# Patient Record
Sex: FEMALE | Race: WHITE | NOT HISPANIC OR LATINO | Employment: FULL TIME | URBAN - METROPOLITAN AREA
[De-identification: names, ages, dates, MRNs, and addresses within clinical notes are randomized per-mention and may not be internally consistent; named-entity substitution may affect disease eponyms.]

---

## 2018-09-25 ENCOUNTER — OFFICE VISIT (OUTPATIENT)
Dept: OBSTETRICS AND GYNECOLOGY | Facility: CLINIC | Age: 33
End: 2018-09-25
Payer: COMMERCIAL

## 2018-09-25 VITALS
WEIGHT: 274.5 LBS | HEIGHT: 67 IN | SYSTOLIC BLOOD PRESSURE: 140 MMHG | BODY MASS INDEX: 43.08 KG/M2 | DIASTOLIC BLOOD PRESSURE: 98 MMHG

## 2018-09-25 DIAGNOSIS — Z12.4 SCREENING FOR CERVICAL CANCER: ICD-10-CM

## 2018-09-25 DIAGNOSIS — Z01.419 WELL WOMAN EXAM WITH ROUTINE GYNECOLOGICAL EXAM: Primary | ICD-10-CM

## 2018-09-25 PROCEDURE — 88175 CYTOPATH C/V AUTO FLUID REDO: CPT

## 2018-09-25 PROCEDURE — 99999 PR PBB SHADOW E&M-NEW PATIENT-LVL III: CPT | Mod: PBBFAC,,, | Performed by: OBSTETRICS & GYNECOLOGY

## 2018-09-25 PROCEDURE — 87624 HPV HI-RISK TYP POOLED RSLT: CPT

## 2018-09-25 PROCEDURE — 99385 PREV VISIT NEW AGE 18-39: CPT | Mod: S$GLB,,, | Performed by: OBSTETRICS & GYNECOLOGY

## 2018-09-25 RX ORDER — LISINOPRIL 10 MG/1
10 TABLET ORAL DAILY
COMMUNITY
End: 2018-09-25 | Stop reason: SDUPTHER

## 2018-09-25 RX ORDER — LISINOPRIL 10 MG/1
10 TABLET ORAL DAILY
Qty: 30 TABLET | Refills: 2 | Status: SHIPPED | OUTPATIENT
Start: 2018-09-25 | End: 2019-03-18 | Stop reason: SDUPTHER

## 2018-09-25 NOTE — PROGRESS NOTES
"       GYNECOLOGY OFFICE NOTE    Reason for visit: annual    HPI: Pt is a 32 y.o.  female  who presents for annual. Cycle: menarche- 11, Interval-  Q month, Duration- 4-5 days, Flow- heavy (changes ultra tampons every 2-3 hours for the first 3 heavy days), denies dysmenorrhea. She is sexually active, trying to conceived for a little over 1 yr.  had vasectomy 2015 and had reversal 2017 (has not yet followed up). She does not desire STI screening. She denies vaginal discharge.  Last pap: , denies hx of abnormal.     Past Medical History:   Diagnosis Date    Asthma     Hypertension        Past Surgical History:   Procedure Laterality Date    tonsilectomy         Family History   Problem Relation Age of Onset    Heart failure Paternal Grandmother     Heart disease Maternal Grandmother     Colon cancer Maternal Grandfather     Leukemia Maternal Grandfather     Hypertension Father     Rheum arthritis Mother     Asthma Mother     Heart failure Mother     Hypertension Mother        Social History     Tobacco Use    Smoking status: Former Smoker     Packs/day: 0.50     Types: Cigarettes     Last attempt to quit: 4/10/2018     Years since quittin.4   Substance Use Topics    Alcohol use: No     Frequency: Never    Drug use: No       OB History    Para Term  AB Living   0 0 0 0 0 0   SAB TAB Ectopic Multiple Live Births   0 0 0 0 0             Current Outpatient Medications   Medication Sig    lisinopril 10 MG tablet Take 1 tablet (10 mg total) by mouth once daily.     No current facility-administered medications for this visit.        Allergies: Peanut and Erythromycin     BP (!) 140/98   Ht 5' 7" (1.702 m)   Wt 124.5 kg (274 lb 7.6 oz)   LMP 2018 (Approximate)   BMI 42.99 kg/m²     ROS:  GENERAL: Denies fever or chills.   SKIN: Denies rash or lesions.   HEAD: Denies head injury or headache.   CHEST: Denies chest pain or shortness of breath.   CARDIOVASCULAR: " Denies palpitations or chest pain.   ABDOMEN: No abdominal pain, constipation, diarrhea, nausea, vomiting or rectal bleeding.   URINARY: No dysuria, hematuria, or burning on urination.  REPRODUCTIVE: See HPI.   BREASTS: see HPI  NEUROLOGIC: Denies syncope or weakness.     Physical Exam:  GENERAL: alert, appears stated age and cooperative  NEUROLOGIC: orientated to person, place and time, normal mood and affect   CHEST: Normal respiratory effort  NECK: normal appearance, no thyromegaly masses or tenderness  SKIN: no acne, striae, hirsutism  BREAST EXAM: breasts appear normal, no suspicious masses, no skin or nipple changes or axillary nodes  ABDOMEN: abdomen is soft without significant tenderness, masses, organomegaly or guarding, no hernias noted  EXTERNAL GENITALIA:  normal general appearance  URETHRA: normal urethra, normal urethral meatus  VAGINA:  normal mucosa without prolapse or lesions  CERVIX:  Normal  UTERUS:  normal size, mobile, non-tender, normal shape and consistency  ADNEXA:  normal adnexa in size, nontender and no masses    Diagnosis:  1. Well woman exam with routine gynecological exam    2. Screening for cervical cancer        Plan:   1. Annual- refill on lisinopril sent. Encourage to establish care with PCP. Discussed weight loss, BP meds, need for SA, prior to starting work-up for trying to conceive  2. Pap/hpv today    Orders Placed This Encounter    HPV High Risk Genotypes, PCR    Liquid-based pap smear, screening    lisinopril 10 MG tablet       Patient was counseled today on the new ACS guidelines for cervical cytology screening as well as the current recommendations for breast cancer screening. She was counseled to follow up with her PCP for other routine health maintenance.     Follow-up for annual.      Rama Plummer MD  OB/GYN  Pager: [phone]

## 2018-10-02 LAB
HPV HR 12 DNA CVX QL NAA+PROBE: NEGATIVE
HPV16 AG SPEC QL: NEGATIVE
HPV18 DNA SPEC QL NAA+PROBE: NEGATIVE

## 2019-02-08 ENCOUNTER — OFFICE VISIT (OUTPATIENT)
Dept: URGENT CARE | Facility: CLINIC | Age: 34
End: 2019-02-08
Payer: COMMERCIAL

## 2019-02-08 VITALS
DIASTOLIC BLOOD PRESSURE: 95 MMHG | RESPIRATION RATE: 18 BRPM | HEIGHT: 67 IN | TEMPERATURE: 96 F | WEIGHT: 274 LBS | HEART RATE: 84 BPM | OXYGEN SATURATION: 98 % | SYSTOLIC BLOOD PRESSURE: 154 MMHG | BODY MASS INDEX: 43 KG/M2

## 2019-02-08 DIAGNOSIS — J11.1 INFLUENZA: Primary | ICD-10-CM

## 2019-02-08 DIAGNOSIS — J02.9 SORE THROAT: ICD-10-CM

## 2019-02-08 LAB
CTP QC/QA: YES
CTP QC/QA: YES
FLUAV AG NPH QL: NEGATIVE
FLUBV AG NPH QL: NEGATIVE
S PYO RRNA THROAT QL PROBE: NEGATIVE

## 2019-02-08 PROCEDURE — 3008F BODY MASS INDEX DOCD: CPT | Mod: CPTII,S$GLB,, | Performed by: PHYSICIAN ASSISTANT

## 2019-02-08 PROCEDURE — 87804 POCT INFLUENZA A/B: ICD-10-PCS | Mod: QW,S$GLB,, | Performed by: PHYSICIAN ASSISTANT

## 2019-02-08 PROCEDURE — 87804 INFLUENZA ASSAY W/OPTIC: CPT | Mod: QW,S$GLB,, | Performed by: PHYSICIAN ASSISTANT

## 2019-02-08 PROCEDURE — 99203 OFFICE O/P NEW LOW 30 MIN: CPT | Mod: S$GLB,,, | Performed by: PHYSICIAN ASSISTANT

## 2019-02-08 PROCEDURE — 99203 PR OFFICE/OUTPT VISIT, NEW, LEVL III, 30-44 MIN: ICD-10-PCS | Mod: S$GLB,,, | Performed by: PHYSICIAN ASSISTANT

## 2019-02-08 PROCEDURE — 3008F PR BODY MASS INDEX (BMI) DOCUMENTED: ICD-10-PCS | Mod: CPTII,S$GLB,, | Performed by: PHYSICIAN ASSISTANT

## 2019-02-08 PROCEDURE — 87880 STREP A ASSAY W/OPTIC: CPT | Mod: QW,S$GLB,, | Performed by: PHYSICIAN ASSISTANT

## 2019-02-08 PROCEDURE — 87880 POCT RAPID STREP A: ICD-10-PCS | Mod: QW,S$GLB,, | Performed by: PHYSICIAN ASSISTANT

## 2019-02-08 RX ORDER — OSELTAMIVIR PHOSPHATE 75 MG/1
75 CAPSULE ORAL 2 TIMES DAILY
Qty: 10 CAPSULE | Refills: 0 | Status: SHIPPED | OUTPATIENT
Start: 2019-02-08 | End: 2019-02-13

## 2019-02-08 RX ORDER — ONDANSETRON 4 MG/1
4 TABLET, ORALLY DISINTEGRATING ORAL EVERY 8 HOURS PRN
Qty: 12 TABLET | Refills: 0 | Status: SHIPPED | OUTPATIENT
Start: 2019-02-08 | End: 2020-02-27

## 2019-02-08 NOTE — PROGRESS NOTES
"Subjective:       Patient ID: Michelle Izaguirre is a 33 y.o. female.    Vitals:  height is 5' 7" (1.702 m) and weight is 124.3 kg (274 lb). Her temperature is 96.3 °F (35.7 °C). Her blood pressure is 154/95 (abnormal) and her pulse is 84. Her respiration is 18 and oxygen saturation is 98%.     Chief Complaint: Sore Throat    Patient has some was seen yesterday and has positive for the flu.  Earlier today she began having sore throat fever headache and fatigue.      Sore Throat    This is a new problem. The current episode started today. The problem has been unchanged. The maximum temperature recorded prior to her arrival was 100.4 - 100.9 F. The pain is at a severity of 2/10. Associated symptoms include headaches. Pertinent negatives include no congestion, coughing, ear pain, shortness of breath, stridor or vomiting. She has had no exposure to strep or mono. She has tried nothing for the symptoms.       Constitution: Positive for fatigue and fever. Negative for chills and sweating.   HENT: Positive for sore throat. Negative for ear pain, congestion, sinus pain, sinus pressure and voice change.    Neck: Negative for painful lymph nodes.   Eyes: Negative for eye redness.   Respiratory: Negative for chest tightness, cough, sputum production, bloody sputum, COPD, shortness of breath, stridor, wheezing and asthma.    Gastrointestinal: Negative for nausea and vomiting.   Musculoskeletal: Negative for muscle ache.   Skin: Negative for rash.   Allergic/Immunologic: Negative for seasonal allergies and asthma.   Neurological: Positive for headaches.   Hematologic/Lymphatic: Negative for swollen lymph nodes.       Objective:      Physical Exam   Constitutional: She is oriented to person, place, and time. She appears well-developed and well-nourished. She is cooperative.  Non-toxic appearance. She does not appear ill. No distress.   HENT:   Head: Normocephalic and atraumatic.   Right Ear: Hearing, tympanic membrane, external ear " and ear canal normal.   Left Ear: Hearing, tympanic membrane, external ear and ear canal normal.   Nose: Rhinorrhea present. No mucosal edema or nasal deformity. No epistaxis. Right sinus exhibits no maxillary sinus tenderness and no frontal sinus tenderness. Left sinus exhibits no maxillary sinus tenderness and no frontal sinus tenderness.   Mouth/Throat: Uvula is midline and mucous membranes are normal. No trismus in the jaw. Normal dentition. No uvula swelling. Posterior oropharyngeal erythema present. Tonsils are 0 on the right. Tonsils are 0 on the left.   Eyes: Conjunctivae and lids are normal. No scleral icterus.   Sclera clear bilat   Neck: Trachea normal, full passive range of motion without pain and phonation normal. Neck supple.   Cardiovascular: Normal rate, regular rhythm, normal heart sounds, intact distal pulses and normal pulses.   Pulmonary/Chest: Effort normal and breath sounds normal. No respiratory distress.   Abdominal: Soft. Normal appearance and bowel sounds are normal. She exhibits no distension. There is no tenderness.   Musculoskeletal: Normal range of motion. She exhibits no edema or deformity.   Neurological: She is alert and oriented to person, place, and time. She exhibits normal muscle tone. Coordination normal.   Skin: Skin is warm, dry and intact. She is not diaphoretic. No pallor.   Psychiatric: She has a normal mood and affect. Her speech is normal and behavior is normal. Judgment and thought content normal. Cognition and memory are normal.   Nursing note and vitals reviewed.      Assessment:       1. Influenza    2. Sore throat        Plan:         Influenza  -     oseltamivir (TAMIFLU) 75 MG capsule; Take 1 capsule (75 mg total) by mouth 2 (two) times daily. for 5 days  Dispense: 10 capsule; Refill: 0  -     ondansetron (ZOFRAN-ODT) 4 MG TbDL; Take 1 tablet (4 mg total) by mouth every 8 (eight) hours as needed (nausea).  Dispense: 12 tablet; Refill: 0    Sore throat  -     POCT  rapid strep A  -     POCT Influenza A/B          The Flu (Influenza)     The virus that causes the flu spreads through the air in droplets when someone who has the flu coughs, sneezes, laughs, or talks.   The flu (influenza) is an infection that affects your respiratory tract. This tract is made up of your mouth, nose, and lungs, and the passages between them. Unlike a cold, the flu can make you very ill. And it can lead to pneumonia, a serious lung infection. The flu can have serious complications and even cause death.  Who is at risk for the flu?  Anyone can get the flu. But you are more likely to become infected if you:  · Have a weakened immune system  · Work in a healthcare setting where you may be exposed to flu germs  · Live or work with someone who has the flu  · Havent had an annual flu shot  How does the flu spread?  The flu is caused by a virus. The virus spreads through the air in droplets when someone who has the flu coughs, sneezes, laughs, or talks. You can become infected when you inhale these viruses directly. You can also become infected when you touch a surface on which the droplets have landed and then transfer the germs to your eyes, nose, or mouth. Touching used tissues, or sharing utensils, drinking glasses, or a toothbrush from an infected person can expose you to flu viruses, too.  What are the symptoms of the flu?  Flu symptoms tend to come on quickly and may last a few days to a few weeks. They include:  · Fever usually higher than 100.4°F  (38°C) and chills  · Sore throat and headache  · Dry cough  · Runny nose  · Tiredness and weakness  · Muscle aches  Who is at risk for flu complications?  For some people, the flu can be very serious. The risk for complications is greater for:  · Children younger than age 5  · Adults ages 65 and older  · People with a chronic illness such as diabetes or heart, kidney, or lung disease  · People who live in a nursing home or long-term care facility   How  is the flu treated?  The flu usually gets better after 7 days or so. In some cases, your healthcare provider may prescribe an antiviral medicine. This may help you get well a little sooner. For the medicine to help, you need to take it as soon as possible (ideally within 48 hours) after your symptoms start. If you develop pneumonia or other serious illness, you may need to stay in the hospital.  Easing flu symptoms  · Drink lots of fluids such as water, juice, and warm soup. A good rule is to drink enough so that you urinate your normal amount.  · Get plenty of rest.  · Ask your healthcare provider what to take for fever and pain.  · Call your provider if your fever is 100.4°F (38°C) or higher, or you become dizzy, lightheaded, or short of breath.  Taking steps to protect others  · Wash your hands often, especially after coughing or sneezing. Or clean your hands with an alcohol-based hand  containing at least 60% alcohol.  · Cough or sneeze into a tissue. Then throw the tissue away and wash your hands. If you dont have a tissue, cough and sneeze into your elbow.  · Stay home until at least 24 hours after you no longer have a fever or chills. Be sure the fever isnt being hidden by fever-reducing medicine.  · Dont share food, utensils, drinking glasses, or a toothbrush with others.  · Ask your healthcare provider if others in your household should get antiviral medicine to help them avoid infection.  How can the flu be prevented?  · One of the best ways to avoid the flu is to get a flu vaccine each year. The virus that causes the flu changes from year to year. For that reason, healthcare providers recommend getting the flu vaccine each year, as soon as it's available in your area. The vaccine is given as a shot. Your healthcare provider can tell you which vaccine is right for you. A nasal spray is also available but is not recommended for the 3534-7643 flu season. The CDC says this is because the nasal spray  did not seem to protect against the flu over the last several flu seasons. In the past, it was meant for people ages 2 to 49.  · Wash your hands often. Frequent handwashing is a proven way to help prevent infection.  · Carry an alcohol-based hand gel containing at least 60% alcohol. Use it when you can't use soap and water. Then wash your hands as soon as you can.  · Avoid touching your eyes, nose, and mouth.  · At home and work, clean phones, computer keyboards, and toys often with disinfectant wipes.  · If possible, avoid close contact with others who have the flu or symptoms of the flu.  Handwashing tips  Handwashing is one of the best ways to prevent many common infections. If you are caring for or visiting someone with the flu, wash your hands each time you enter and leave the room. Follow these steps:  · Use warm water and plenty of soap. Rub your hands together well.  · Clean the whole hand, including under your nails, between your fingers, and up the wrists.  · Wash for at least 15 seconds.  · Rinse, letting the water run down your fingers, not up your wrists.  · Dry your hands well. Use a paper towel to turn off the faucet and open the door.  Using alcohol-based hand   Alcohol-based hand  are also a good choice. Use them when you can't use soap and water. Follow these steps:  · Squeeze about a tablespoon of gel into the palm of one hand.  · Rub your hands together briskly, cleaning the backs of your hands, the palms, between your fingers, and up the wrists.  · Rub until the gel is gone and your hands are completely dry.  Preventing the flu in healthcare settings  The flu is a special concern for people in hospitals and long-term care facilities. To help prevent the spread of flu, many hospitals and nursing homes take these steps:  · Healthcare providers wash their hands or use an alcohol-based hand  before and after treating each patient.  · People with the flu have private rooms and  bathrooms or share a room with someone with the same infection.  · People who are at high risk for the flu but don't have it are encouraged to get the flu and pneumonia vaccines.  · All healthcare workers are encouraged or required to get flu shots.   Date Last Reviewed: 12/1/2016  © 7324-0585 [employer]. [address] [place] [address], [place] [address]. All rights reserved. This information is not intended as a substitute for professional medical care. Always follow your healthcare professional's instructions.      Please follow up with your Primary care provider within 2-5 days if your signs and symptoms have not resolved or worsen.     If your condition worsens or fails to improve we recommend that you receive another evaluation at the emergency room immediately or contact your primary medical clinic to discuss your concerns.   You must understand that you have received an Urgent Care treatment only and that you may be released before all of your medical problems are known or treated. You, the patient, will arrange for follow up care as instructed.     RED FLAGS/WARNING SYMPTOMS DISCUSSED WITH PATIENT THAT WOULD WARRANT EMERGENT MEDICAL ATTENTION. PATIENT VERBALIZED UNDERSTANDING.     Elevated Blood Pressure  Your blood pressure was elevated during your visit to the urgent care.  It was not so high that immediate care was needed but it is recommended that you monitor your blood pressure over the next week or two to make sure that it is not staying elevated.  Please have your blood pressure taken 2-3 times daily at different times of the day.  Write all of those blood pressures down and record the time that they were taken.  Keep all that information and take it with you to see your Primary Care Physician.  If your blood pressure is consistently above 140/90 you will need to follow up with your PCP more quickly

## 2019-03-18 RX ORDER — LISINOPRIL 10 MG/1
TABLET ORAL
Qty: 30 TABLET | Refills: 0 | Status: SHIPPED | OUTPATIENT
Start: 2019-03-18 | End: 2019-04-13 | Stop reason: SDUPTHER

## 2019-04-15 RX ORDER — LISINOPRIL 10 MG/1
TABLET ORAL
Qty: 30 TABLET | Refills: 2 | Status: SHIPPED | OUTPATIENT
Start: 2019-04-15 | End: 2019-11-25 | Stop reason: SDUPTHER

## 2019-07-26 ENCOUNTER — OFFICE VISIT (OUTPATIENT)
Dept: URGENT CARE | Facility: CLINIC | Age: 34
End: 2019-07-26
Payer: COMMERCIAL

## 2019-07-26 VITALS
WEIGHT: 274 LBS | HEIGHT: 67 IN | TEMPERATURE: 99 F | BODY MASS INDEX: 43 KG/M2 | HEART RATE: 100 BPM | SYSTOLIC BLOOD PRESSURE: 156 MMHG | OXYGEN SATURATION: 98 % | DIASTOLIC BLOOD PRESSURE: 90 MMHG | RESPIRATION RATE: 20 BRPM

## 2019-07-26 DIAGNOSIS — J06.9 VIRAL URI WITH COUGH: Primary | ICD-10-CM

## 2019-07-26 PROCEDURE — 99214 OFFICE O/P EST MOD 30 MIN: CPT | Mod: S$GLB,,, | Performed by: NURSE PRACTITIONER

## 2019-07-26 PROCEDURE — 3008F PR BODY MASS INDEX (BMI) DOCUMENTED: ICD-10-PCS | Mod: CPTII,S$GLB,, | Performed by: NURSE PRACTITIONER

## 2019-07-26 PROCEDURE — 99214 PR OFFICE/OUTPT VISIT, EST, LEVL IV, 30-39 MIN: ICD-10-PCS | Mod: S$GLB,,, | Performed by: NURSE PRACTITIONER

## 2019-07-26 PROCEDURE — 3008F BODY MASS INDEX DOCD: CPT | Mod: CPTII,S$GLB,, | Performed by: NURSE PRACTITIONER

## 2019-07-26 RX ORDER — ALBUTEROL SULFATE 90 UG/1
AEROSOL, METERED RESPIRATORY (INHALATION)
COMMUNITY
End: 2020-01-23 | Stop reason: ALTCHOICE

## 2019-07-26 RX ORDER — BENZONATATE 100 MG/1
200 CAPSULE ORAL 3 TIMES DAILY PRN
Qty: 30 CAPSULE | Refills: 0 | Status: SHIPPED | OUTPATIENT
Start: 2019-07-26 | End: 2019-08-05

## 2019-07-26 RX ORDER — CODEINE PHOSPHATE AND GUAIFENESIN 10; 100 MG/5ML; MG/5ML
5 SOLUTION ORAL 3 TIMES DAILY PRN
Qty: 118 ML | Refills: 0 | Status: SHIPPED | OUTPATIENT
Start: 2019-07-26 | End: 2019-08-02

## 2019-07-26 NOTE — PATIENT INSTRUCTIONS
You have been diagnosed with a viral syndrome. Viral syndromes are self-limited and usually resolve within 10 days from onset of symptoms. Antibiotics are not effective against viral infections. It is important that you get lots of rest and drink plenty of fluids. Treatment is through symptomatic relief.    Below are suggestions for symptomatic relief:   -Tylenol every 4 hours OR ibuprofen every 6 hours as needed for pain/fever.   -Salt water gargles to soothe throat pain.   -Chloroseptic spray also helps to numb throat pain.   -Nasal saline spray reduces inflammation and dryness.   -Warm face compresses to help with facial sinus pain/pressure.   -Vicks vapor rub at night.   -Flonase OTC or Nasacort OTC for nasal congestion.   -Simple foods like chicken noodle soup.   -Delsym helps with coughing at night   -Zyrtec/Claritin during the day & Benadryl at night may help with allergies.     If you DO NOT have Hypertension or any history of palpitations, it is ok to take over the counter Sudafed or Mucinex D or Allegra-D or Claritin-D or Zyrtec-D.  If you do take one of the above, it is ok to combine that with plain over the counter Mucinex or Allegra or Claritin or Zyrtec. If, for example, you are taking Zyrtec -D, you can combine that with Mucinex, but not Mucinex-D.  If you are taking Mucinex-D, you can combine that with plain Allegra or Claritin or Zyrtec.   If you DO have Hypertension or palpitations, it is safe to take Coricidin HBP for relief of sinus symptoms.    Please return to clinic if symptoms have not subsided 10-14 days from onset, as your viral infection may have developed into a bacterial infection. It is at that time antibiotics would be indicated.       If you were prescribed a narcotic or controlled medication, do not drive or operate heavy equipment or machinery while taking these medications.  You must understand that you've received an Urgent Care treatment only and that you may be released before  all your medical problems are known or treated. You, the patient, will arrange for follow up care as instructed.  Follow up with your PCP or specialty clinic as directed within 2-5 days if not improved or as needed.  You can call [phone] to schedule an appointment with the appropriate provider.  If your condition worsens we recommend that you receive another evaluation at the emergency room immediately or contact your primary medical clinics after hours call service to discuss your concerns.  Please return here or go to the Emergency Department for any concerns or worsening of condition.              Viral Upper Respiratory Illness (Adult)  You have a viral upper respiratory illness (URI), which is another term for the common cold. This illness is contagious during the first few days. It is spread through the air by coughing and sneezing. It may also be spread by direct contact (touching the sick person and then touching your own eyes, nose, or mouth). Frequent handwashing will decrease risk of spread. Most viral illnesses go away within 7 to 10 days with rest and simple home remedies. Sometimes the illness may last for several weeks. Antibiotics will not kill a virus, and they are generally not prescribed for this condition.    Home care  · If symptoms are severe, rest at home for the first 2 to 3 days. When you resume activity, don't let yourself get too tired.  · Avoid being exposed to cigarette smoke (yours or others).  · You may use acetaminophen or ibuprofen to control pain and fever, unless another medicine was prescribed. (Note: If you have chronic liver or kidney disease, have ever had a stomach ulcer or gastrointestinal bleeding, or are taking blood-thinning medicines, talk with your healthcare provider before using these medicines.) Aspirin should never be given to anyone under 18 years of age who is ill with a viral infection or fever. It may cause severe liver or brain damage.  · Your appetite  may be poor, so a light diet is fine. Avoid dehydration by drinking 6 to 8 glasses of fluids per day (water, soft drinks, juices, tea, or soup). Extra fluids will help loosen secretions in the nose and lungs.  · Over-the-counter cold medicines will not shorten the length of time youre sick, but they may be helpful for the following symptoms: cough, sore throat, and nasal and sinus congestion. (Note: Do not use decongestants if you have high blood pressure.)  Follow-up care  Follow up with your healthcare provider, or as advised.  When to seek medical advice  Call your healthcare provider right away if any of these occur:  · Cough with lots of colored sputum (mucus)  · Severe headache; face, neck, or ear pain  · Difficulty swallowing due to throat pain  · Fever of 100.4°F (38°C)  Call 911, or get immediate medical care  Call emergency services right away if any of these occur:  · Chest pain, shortness of breath, wheezing, or difficulty breathing  · Coughing up blood  · Inability to swallow due to throat pain  Date Last Reviewed: 9/13/2015  © 3056-6193 [employer]. [address] [place] [address]. All rights reserved. This information is not intended as a substitute for professional medical care. Always follow your healthcare professional's instructions.

## 2019-07-26 NOTE — PROGRESS NOTES
"Subjective:       Patient ID: Michelle Izaguirre is a 33 y.o. female.    Vitals:  height is 5' 7" (1.702 m) and weight is 124.3 kg (274 lb). Her temperature is 99.1 °F (37.3 °C). Her blood pressure is 156/90 (abnormal) and her pulse is 100. Her respiration is 20 and oxygen saturation is 98%.     Chief Complaint: Sinus Problem    Sinus Problem   This is a recurrent problem. The current episode started yesterday. Associated symptoms include congestion, coughing and a hoarse voice. Pertinent negatives include no chills, diaphoresis, ear pain, shortness of breath, sinus pressure or sore throat. Past treatments include oral decongestants.       Constitution: Positive for fever (subjective). Negative for chills, sweating and fatigue.   HENT: Positive for congestion. Negative for ear pain, sinus pain, sinus pressure, sore throat and voice change.    Neck: Negative for painful lymph nodes.   Eyes: Negative for eye redness.   Respiratory: Positive for cough. Negative for chest tightness, sputum production, bloody sputum, COPD, shortness of breath, stridor, wheezing and asthma.    Gastrointestinal: Negative for nausea and vomiting.   Musculoskeletal: Negative for muscle ache.   Skin: Negative for rash.   Allergic/Immunologic: Negative for seasonal allergies and asthma.   Hematologic/Lymphatic: Negative for swollen lymph nodes.       Objective:      Physical Exam   Constitutional: She is oriented to person, place, and time. Vital signs are normal. She appears well-developed and well-nourished. She is active and cooperative.  Non-toxic appearance. She does not have a sickly appearance. She does not appear ill. No distress.   HENT:   Head: Normocephalic and atraumatic.   Right Ear: Hearing, tympanic membrane, external ear and ear canal normal.   Left Ear: Hearing, tympanic membrane, external ear and ear canal normal.   Nose: Rhinorrhea present. No mucosal edema or nasal deformity. No epistaxis. Right sinus exhibits no maxillary " sinus tenderness and no frontal sinus tenderness. Left sinus exhibits no maxillary sinus tenderness and no frontal sinus tenderness.   Mouth/Throat: Uvula is midline and mucous membranes are normal. No trismus in the jaw. Normal dentition. No uvula swelling. Posterior oropharyngeal erythema (mild) present. No oropharyngeal exudate, posterior oropharyngeal edema or tonsillar abscesses. No tonsillar exudate.   Eyes: Pupils are equal, round, and reactive to light. Conjunctivae, EOM and lids are normal. No scleral icterus.   Sclera clear bilat   Neck: Trachea normal, full passive range of motion without pain and phonation normal. Neck supple.   Cardiovascular: Normal rate, regular rhythm, normal heart sounds and intact distal pulses.   Pulses:       Radial pulses are 2+ on the right side, and 2+ on the left side.   Pulmonary/Chest: Effort normal and breath sounds normal. No respiratory distress.   Musculoskeletal: Normal range of motion. She exhibits no edema or deformity.   Neurological: She is alert and oriented to person, place, and time. She has normal strength. Gait normal.   Skin: Skin is warm, dry and intact. Capillary refill takes less than 2 seconds. No rash noted. She is not diaphoretic. No pallor.   Psychiatric: She has a normal mood and affect. Her speech is normal and behavior is normal. Judgment and thought content normal. Cognition and memory are normal.   Nursing note and vitals reviewed.      Assessment:       1. Viral URI with cough        Plan:         Viral URI with cough  -     benzonatate (TESSALON PERLES) 100 MG capsule; Take 2 capsules (200 mg total) by mouth 3 (three) times daily as needed for Cough.  Dispense: 30 capsule; Refill: 0  -     guaifenesin-codeine 100-10 mg/5 ml (TUSSI-ORGANIDIN NR)  mg/5 mL syrup; Take 5 mLs by mouth 3 (three) times daily as needed for Cough (Take at night may cause drowsiness for cough).  Dispense: 118 mL; Refill: 0      Patient Instructions   You have been  diagnosed with a viral syndrome. Viral syndromes are self-limited and usually resolve within 10 days from onset of symptoms. Antibiotics are not effective against viral infections. It is important that you get lots of rest and drink plenty of fluids. Treatment is through symptomatic relief.    Below are suggestions for symptomatic relief:   -Tylenol every 4 hours OR ibuprofen every 6 hours as needed for pain/fever.   -Salt water gargles to soothe throat pain.   -Chloroseptic spray also helps to numb throat pain.   -Nasal saline spray reduces inflammation and dryness.   -Warm face compresses to help with facial sinus pain/pressure.   -Vicks vapor rub at night.   -Flonase OTC or Nasacort OTC for nasal congestion.   -Simple foods like chicken noodle soup.   -Delsym helps with coughing at night   -Zyrtec/Claritin during the day & Benadryl at night may help with allergies.     If you DO NOT have Hypertension or any history of palpitations, it is ok to take over the counter Sudafed or Mucinex D or Allegra-D or Claritin-D or Zyrtec-D.  If you do take one of the above, it is ok to combine that with plain over the counter Mucinex or Allegra or Claritin or Zyrtec. If, for example, you are taking Zyrtec -D, you can combine that with Mucinex, but not Mucinex-D.  If you are taking Mucinex-D, you can combine that with plain Allegra or Claritin or Zyrtec.   If you DO have Hypertension or palpitations, it is safe to take Coricidin HBP for relief of sinus symptoms.    Please return to clinic if symptoms have not subsided 10-14 days from onset, as your viral infection may have developed into a bacterial infection. It is at that time antibiotics would be indicated.       If you were prescribed a narcotic or controlled medication, do not drive or operate heavy equipment or machinery while taking these medications.  You must understand that you've received an Urgent Care treatment only and that you may be released before all your  medical problems are known or treated. You, the patient, will arrange for follow up care as instructed.  Follow up with your PCP or specialty clinic as directed within 2-5 days if not improved or as needed.  You can call [phone] to schedule an appointment with the appropriate provider.  If your condition worsens we recommend that you receive another evaluation at the emergency room immediately or contact your primary medical clinics after hours call service to discuss your concerns.  Please return here or go to the Emergency Department for any concerns or worsening of condition.              Viral Upper Respiratory Illness (Adult)  You have a viral upper respiratory illness (URI), which is another term for the common cold. This illness is contagious during the first few days. It is spread through the air by coughing and sneezing. It may also be spread by direct contact (touching the sick person and then touching your own eyes, nose, or mouth). Frequent handwashing will decrease risk of spread. Most viral illnesses go away within 7 to 10 days with rest and simple home remedies. Sometimes the illness may last for several weeks. Antibiotics will not kill a virus, and they are generally not prescribed for this condition.    Home care  · If symptoms are severe, rest at home for the first 2 to 3 days. When you resume activity, don't let yourself get too tired.  · Avoid being exposed to cigarette smoke (yours or others).  · You may use acetaminophen or ibuprofen to control pain and fever, unless another medicine was prescribed. (Note: If you have chronic liver or kidney disease, have ever had a stomach ulcer or gastrointestinal bleeding, or are taking blood-thinning medicines, talk with your healthcare provider before using these medicines.) Aspirin should never be given to anyone under 18 years of age who is ill with a viral infection or fever. It may cause severe liver or brain damage.  · Your appetite may be  poor, so a light diet is fine. Avoid dehydration by drinking 6 to 8 glasses of fluids per day (water, soft drinks, juices, tea, or soup). Extra fluids will help loosen secretions in the nose and lungs.  · Over-the-counter cold medicines will not shorten the length of time youre sick, but they may be helpful for the following symptoms: cough, sore throat, and nasal and sinus congestion. (Note: Do not use decongestants if you have high blood pressure.)  Follow-up care  Follow up with your healthcare provider, or as advised.  When to seek medical advice  Call your healthcare provider right away if any of these occur:  · Cough with lots of colored sputum (mucus)  · Severe headache; face, neck, or ear pain  · Difficulty swallowing due to throat pain  · Fever of 100.4°F (38°C)  Call 911, or get immediate medical care  Call emergency services right away if any of these occur:  · Chest pain, shortness of breath, wheezing, or difficulty breathing  · Coughing up blood  · Inability to swallow due to throat pain  Date Last Reviewed: 9/13/2015  © 5834-0153 [employer]. [address] [place] [id number]. All rights reserved. This information is not intended as a substitute for professional medical care. Always follow your healthcare professional's instructions.

## 2019-11-15 RX ORDER — LISINOPRIL 10 MG/1
TABLET ORAL
Qty: 30 TABLET | Refills: 0 | OUTPATIENT
Start: 2019-11-15

## 2019-11-25 ENCOUNTER — OFFICE VISIT (OUTPATIENT)
Dept: FAMILY MEDICINE | Facility: CLINIC | Age: 34
End: 2019-11-25
Payer: COMMERCIAL

## 2019-11-25 VITALS
WEIGHT: 283.94 LBS | TEMPERATURE: 99 F | DIASTOLIC BLOOD PRESSURE: 82 MMHG | SYSTOLIC BLOOD PRESSURE: 132 MMHG | HEART RATE: 95 BPM | OXYGEN SATURATION: 97 % | HEIGHT: 67 IN | BODY MASS INDEX: 44.56 KG/M2

## 2019-11-25 DIAGNOSIS — F33.2 SEVERE EPISODE OF RECURRENT MAJOR DEPRESSIVE DISORDER, WITHOUT PSYCHOTIC FEATURES: Primary | ICD-10-CM

## 2019-11-25 DIAGNOSIS — F41.1 GENERALIZED ANXIETY DISORDER: ICD-10-CM

## 2019-11-25 PROBLEM — M51.34 DEGENERATION OF THORACIC INTERVERTEBRAL DISC: Status: ACTIVE | Noted: 2019-11-25

## 2019-11-25 PROCEDURE — 3008F PR BODY MASS INDEX (BMI) DOCUMENTED: ICD-10-PCS | Mod: CPTII,S$GLB,, | Performed by: FAMILY MEDICINE

## 2019-11-25 PROCEDURE — 99999 PR PBB SHADOW E&M-EST. PATIENT-LVL IV: CPT | Mod: PBBFAC,,, | Performed by: FAMILY MEDICINE

## 2019-11-25 PROCEDURE — 90471 IMMUNIZATION ADMIN: CPT | Mod: S$GLB,,, | Performed by: FAMILY MEDICINE

## 2019-11-25 PROCEDURE — 99999 PR PBB SHADOW E&M-EST. PATIENT-LVL IV: ICD-10-PCS | Mod: PBBFAC,,, | Performed by: FAMILY MEDICINE

## 2019-11-25 PROCEDURE — 90471 TDAP VACCINE GREATER THAN OR EQUAL TO 7YO IM: ICD-10-PCS | Mod: S$GLB,,, | Performed by: FAMILY MEDICINE

## 2019-11-25 PROCEDURE — 99214 PR OFFICE/OUTPT VISIT, EST, LEVL IV, 30-39 MIN: ICD-10-PCS | Mod: 25,S$GLB,, | Performed by: FAMILY MEDICINE

## 2019-11-25 PROCEDURE — 90715 TDAP VACCINE 7 YRS/> IM: CPT | Mod: S$GLB,,, | Performed by: FAMILY MEDICINE

## 2019-11-25 PROCEDURE — 99214 OFFICE O/P EST MOD 30 MIN: CPT | Mod: 25,S$GLB,, | Performed by: FAMILY MEDICINE

## 2019-11-25 PROCEDURE — 90715 TDAP VACCINE GREATER THAN OR EQUAL TO 7YO IM: ICD-10-PCS | Mod: S$GLB,,, | Performed by: FAMILY MEDICINE

## 2019-11-25 PROCEDURE — 3008F BODY MASS INDEX DOCD: CPT | Mod: CPTII,S$GLB,, | Performed by: FAMILY MEDICINE

## 2019-11-25 RX ORDER — VENLAFAXINE HYDROCHLORIDE 37.5 MG/1
37.5 CAPSULE, EXTENDED RELEASE ORAL DAILY
Qty: 30 CAPSULE | Refills: 1 | Status: SHIPPED | OUTPATIENT
Start: 2019-11-25 | End: 2020-01-23 | Stop reason: SDUPTHER

## 2019-11-25 RX ORDER — LISINOPRIL 10 MG/1
10 TABLET ORAL DAILY
Qty: 90 TABLET | Refills: 0 | Status: SHIPPED | OUTPATIENT
Start: 2019-11-25 | End: 2020-03-23 | Stop reason: SDUPTHER

## 2019-11-25 RX ORDER — EPINEPHRINE 0.3 MG/.3ML
INJECTION SUBCUTANEOUS ONCE
COMMUNITY
End: 2021-10-28

## 2019-11-25 NOTE — PROGRESS NOTES
"Subjective:       Patient ID: Michelle Izaguirre is a 34 y.o. female.    Chief Complaint: Establish Care    33 yo F pt, new to me, with PMH significant for Asthma and HTN. She presents to establish care.  Today she would like to discuss abnormal mood; reports feeling progressively down and depressed over the past 1 year. Additionally she would like help with managing her weight.    --She states that there was no specific trigger for worsening mood over the past 1 year. 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 At baseline she worked as the , but she is now over the [employer] in [employer] program as well.  She complains that the [email or web address] in music program has a lot of required paperwork for IEPs. Reports that she struggles to finish tasks and remain organized.  Also feeling very distracted and anxious while working.  Her mind races at night and she has been finding it difficult to sleep---somewhat improved with OTC melatonin p.r.n.  (+) S   (+) I--feels like she wants to get out to participate in activities with her stepson, but can't seem to get off of the sofa.   (+) G--states "I feel like a schlub"   (+/-) E--states energy waxes and wanes   (+)C    (+)A--reports decreased appetite; often overeating out of comfort.   (+)P   (-) SI/HI/AVH    --She has been trying to eat breakfast; normally has Ratio protein bar. Has string cheese for snack. When she doesn't pack a lunch, she eats the school lunch which is often a very large portion. For afternoon snack she often brings almonds; alternatively may buy cookies from vending machine. She is physically active on job (11K steps daily). No routine physical activity.        Review of Systems   Constitutional: Positive for activity change and appetite change. Negative for chills, fever and unexpected weight change.   HENT: Negative for congestion, sneezing and sore throat.    Eyes: Negative for redness, itching and visual " "disturbance.   Respiratory: Negative for cough, chest tightness, shortness of breath and wheezing.    Cardiovascular: Negative for chest pain.   Gastrointestinal: Negative for abdominal pain, constipation, diarrhea, nausea and vomiting.   Genitourinary: Negative for dysuria, frequency, hematuria, urgency, vaginal bleeding and vaginal discharge.   Skin: Negative for rash.   Neurological: Negative for dizziness, syncope and headaches.   Psychiatric/Behavioral: Positive for decreased concentration, dysphoric mood and sleep disturbance. Negative for suicidal ideas. The patient is nervous/anxious.          Past Medical History:   Diagnosis Date    Asthma     Hiatal hernia     Hypertension        Patient Active Problem List   Diagnosis    Degeneration of thoracic intervertebral disc       Past Surgical History:   Procedure Laterality Date    tonsilectomy         Family History   Problem Relation Age of Onset    Heart failure Paternal Grandmother     Heart disease Maternal Grandmother     Colon cancer Maternal Grandfather     Leukemia Maternal Grandfather     Hypertension Father     Rheum arthritis Mother     Asthma Mother     Heart failure Mother     Hypertension Mother        Social History     Tobacco Use   Smoking Status Former Smoker    Packs/day: 0.50    Types: Cigarettes    Last attempt to quit: 4/10/2018    Years since quittin.6       Social History     Social History Narrative    Tobacco: Intermittent use; once per month has 7 cigarettes; Used ages 18 yo-34 yo; max use 0.5 ppd    EtOH: Twice per week, 2 drinks at one time    Drug: None    Employment: Teacher ()     Education: Bachelor's     Lives with  and 10 yo step-son        Objective:        Vitals:    19 1307   BP: 132/82   Pulse: 95   Temp: 98.7 °F (37.1 °C)   TempSrc: Oral   SpO2: 97%   Weight: 128.8 kg (283 lb 15.2 oz)   Height: 5' 7" (1.702 m)       Physical Exam   Constitutional: She is oriented to " person, place, and time. No distress.   Morbidly Obese (BMI 44.47)   HENT:   Head: Normocephalic and atraumatic.   Right Ear: External ear normal.   Left Ear: External ear normal.   Nose: Nose normal.   Mouth/Throat: Oropharynx is clear and moist. No oropharyngeal exudate.   Eyes: Conjunctivae and EOM are normal. No scleral icterus.   Neck: Normal range of motion. Neck supple. No thyromegaly present.   Cardiovascular: Normal rate, regular rhythm and normal heart sounds. Exam reveals no gallop and no friction rub.   No murmur heard.  Pulmonary/Chest: Effort normal and breath sounds normal. She has no wheezes. She has no rales.   Musculoskeletal: Normal range of motion. She exhibits no edema, tenderness or deformity.   Lymphadenopathy:     She has no cervical adenopathy.   Neurological: She is alert and oriented to person, place, and time. No cranial nerve deficit.   Skin: Skin is warm and dry. No rash noted. No erythema.   Psychiatric: She has a normal mood and affect. Her behavior is normal.       Assessment:       1. Severe episode of recurrent major depressive disorder, without psychotic features    2. Generalized anxiety disorder    3. BMI 40.0-44.9, adult        Plan:       Michelle was seen today for [facility] care.    Diagnoses and all orders for this visit:    Severe episode of recurrent major depressive disorder, without psychotic features  -     venlafaxine (EFFEXOR-XR) 37.5 MG 24 hr capsule; Take 1 capsule (37.5 mg total) by mouth once daily.    Generalized anxiety disorder  -     venlafaxine (EFFEXOR-XR) 37.5 MG 24 hr capsule; Take 1 capsule (37.5 mg total) by mouth once daily.    BMI 40.0-44.9, adult    Other orders  -     (In Office Administered) Tdap Vaccine    JAQUELINE/MDD  New diagnoses  Symptoms present x 1 year  PHQ-9 21; JAQUELINE-7 6 in office today 11/25/19  Discussed treatment options including pharmacotherapy vs psychotherapy vs combination of both   Agrees to start Effexor XR 37.5 mg daily. Risks/Benefits  of medication reviewed  She did try to establish care with a counselor 2 weeks ago---has not received response. Given community behavioral health resource handout for options on counselors in area.   F/U mood in 3-4 weeks    Morbid Obesity  BMI 44.47  --In future plan to discuss diet/lifestyle modifications including: At least 30 min of CV exercise 5 days a week;  plant-based diet;  small/frequent meals; avoidance of sweetened beverages, limit portion sizes, and healthy carbohydrate options (brown rice, 100% whole wheat bread, wheat pasta)    --Will need to discuss R/B/A bariatric surgery vs bariatric medicine  --[facility] program is also an option   --Would like to optimize mood prior to addressing in full detail    HM   Flu vaccine UTD for 2019/2020 season   Tdap today 11/25/2019  Pap/HPV neg 9/2018     Follow up in about 4 weeks (around 12/23/2019).

## 2019-12-19 ENCOUNTER — PATIENT OUTREACH (OUTPATIENT)
Dept: ADMINISTRATIVE | Facility: HOSPITAL | Age: 34
End: 2019-12-19

## 2020-01-23 ENCOUNTER — OFFICE VISIT (OUTPATIENT)
Dept: FAMILY MEDICINE | Facility: CLINIC | Age: 35
End: 2020-01-23
Payer: COMMERCIAL

## 2020-01-23 VITALS
BODY MASS INDEX: 44.36 KG/M2 | HEART RATE: 87 BPM | OXYGEN SATURATION: 98 % | WEIGHT: 282.63 LBS | HEIGHT: 67 IN | SYSTOLIC BLOOD PRESSURE: 132 MMHG | DIASTOLIC BLOOD PRESSURE: 80 MMHG

## 2020-01-23 DIAGNOSIS — F33.2 SEVERE EPISODE OF RECURRENT MAJOR DEPRESSIVE DISORDER, WITHOUT PSYCHOTIC FEATURES: Primary | ICD-10-CM

## 2020-01-23 DIAGNOSIS — H10.13 ALLERGIC CONJUNCTIVITIS OF BOTH EYES: ICD-10-CM

## 2020-01-23 DIAGNOSIS — J45.20 MILD INTERMITTENT ASTHMA WITHOUT COMPLICATION: ICD-10-CM

## 2020-01-23 DIAGNOSIS — F41.1 GENERALIZED ANXIETY DISORDER: ICD-10-CM

## 2020-01-23 DIAGNOSIS — L30.9 DERMATITIS: ICD-10-CM

## 2020-01-23 DIAGNOSIS — E66.01 CLASS 3 SEVERE OBESITY WITH BODY MASS INDEX (BMI) OF 40.0 TO 44.9 IN ADULT, UNSPECIFIED OBESITY TYPE, UNSPECIFIED WHETHER SERIOUS COMORBIDITY PRESENT: ICD-10-CM

## 2020-01-23 DIAGNOSIS — I10 ESSENTIAL HYPERTENSION: ICD-10-CM

## 2020-01-23 PROBLEM — E66.813 CLASS 3 SEVERE OBESITY WITH BODY MASS INDEX (BMI) OF 40.0 TO 44.9 IN ADULT: Status: ACTIVE | Noted: 2020-01-23

## 2020-01-23 PROCEDURE — 3075F SYST BP GE 130 - 139MM HG: CPT | Mod: CPTII,S$GLB,, | Performed by: FAMILY MEDICINE

## 2020-01-23 PROCEDURE — 3079F PR MOST RECENT DIASTOLIC BLOOD PRESSURE 80-89 MM HG: ICD-10-PCS | Mod: CPTII,S$GLB,, | Performed by: FAMILY MEDICINE

## 2020-01-23 PROCEDURE — 99999 PR PBB SHADOW E&M-EST. PATIENT-LVL III: ICD-10-PCS | Mod: PBBFAC,,, | Performed by: FAMILY MEDICINE

## 2020-01-23 PROCEDURE — 3075F PR MOST RECENT SYSTOLIC BLOOD PRESS GE 130-139MM HG: ICD-10-PCS | Mod: CPTII,S$GLB,, | Performed by: FAMILY MEDICINE

## 2020-01-23 PROCEDURE — 99214 OFFICE O/P EST MOD 30 MIN: CPT | Mod: S$GLB,,, | Performed by: FAMILY MEDICINE

## 2020-01-23 PROCEDURE — 3079F DIAST BP 80-89 MM HG: CPT | Mod: CPTII,S$GLB,, | Performed by: FAMILY MEDICINE

## 2020-01-23 PROCEDURE — 99214 PR OFFICE/OUTPT VISIT, EST, LEVL IV, 30-39 MIN: ICD-10-PCS | Mod: S$GLB,,, | Performed by: FAMILY MEDICINE

## 2020-01-23 PROCEDURE — 99999 PR PBB SHADOW E&M-EST. PATIENT-LVL III: CPT | Mod: PBBFAC,,, | Performed by: FAMILY MEDICINE

## 2020-01-23 PROCEDURE — 3008F BODY MASS INDEX DOCD: CPT | Mod: CPTII,S$GLB,, | Performed by: FAMILY MEDICINE

## 2020-01-23 PROCEDURE — 3008F PR BODY MASS INDEX (BMI) DOCUMENTED: ICD-10-PCS | Mod: CPTII,S$GLB,, | Performed by: FAMILY MEDICINE

## 2020-01-23 RX ORDER — METHYLPREDNISOLONE 4 MG/1
TABLET ORAL
Qty: 1 PACKAGE | Refills: 0 | Status: SHIPPED | OUTPATIENT
Start: 2020-01-23 | End: 2020-02-27

## 2020-01-23 RX ORDER — VENLAFAXINE HYDROCHLORIDE 75 MG/1
75 CAPSULE, EXTENDED RELEASE ORAL DAILY
Qty: 30 CAPSULE | Refills: 1 | Status: SHIPPED | OUTPATIENT
Start: 2020-01-23 | End: 2020-04-23

## 2020-01-23 RX ORDER — HYDROXYZINE HYDROCHLORIDE 25 MG/1
25 TABLET, FILM COATED ORAL DAILY PRN
Qty: 30 TABLET | Refills: 0 | Status: SHIPPED | OUTPATIENT
Start: 2020-01-23 | End: 2021-10-28

## 2020-01-23 RX ORDER — HYDROCORTISONE 25 MG/G
CREAM TOPICAL 2 TIMES DAILY
Qty: 28 G | Refills: 0 | Status: SHIPPED | OUTPATIENT
Start: 2020-01-23 | End: 2020-02-27

## 2020-01-23 RX ORDER — OLOPATADINE HYDROCHLORIDE 1 MG/ML
1 SOLUTION/ DROPS OPHTHALMIC 2 TIMES DAILY
Qty: 5 ML | Refills: 1 | Status: SHIPPED | OUTPATIENT
Start: 2020-01-23 | End: 2021-10-28

## 2020-01-23 RX ORDER — ALBUTEROL SULFATE 90 UG/1
2 AEROSOL, METERED RESPIRATORY (INHALATION) EVERY 6 HOURS PRN
Qty: 18 G | Refills: 2 | Status: SHIPPED | OUTPATIENT
Start: 2020-01-23 | End: 2023-12-11 | Stop reason: SDUPTHER

## 2020-01-23 NOTE — PROGRESS NOTES
"Subjective:       Patient ID: Michelle Izaguirre is a 34 y.o. female.    Chief Complaint: Follow-up (also c/o allergies.)    35 yo F pt, recently established with me (last visit 11/25/2019), with PMH significant for Asthma, HTN, and newly dx'd MDD and JAQUELINE.  She presents to f/u MDD and JAQUELINE.   Details of previous HPI reviewed:   ============  --She states that there was no specific trigger for worsening mood over the past 1 year. 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 At baseline she worked as the , but she is now over the talented in [employer] program as well.  She complains that the [employer] program has a lot of required paperwork for IEPs. Reports that she struggles to finish tasks and remain organized.  Also feeling very distracted and anxious while working.  Her mind races at night and she has been finding it difficult to sleep---somewhat improved with OTC melatonin p.r.n.  (+) S   (+) I--feels like she wants to get out to participate in activities with her stepson, but can't seem to get off of the sofa.   (+) G--states "I feel like a schlub"   (+/-) E--states energy waxes and wanes   (+)C    (+)A--reports decreased appetite; often overeating out of comfort.   (+)P   (-) SI/HI/AVH  =========  Last visit she was started on Venlafaxine XR 37.5 mg daily with minimal to mild improvement.  She has not had any adverse effects with medication.  She is interested in increasing the dose.  She initiated psychotherapy with counselor 11/2019; now seeing counselor once every other week.     Today she acutely c/o seasonal allergies affecting her eyes.  She reports pruritic and painful rash to bilateral upper and lower eyelids that has been persistent since October 2019.  She has also been experiencing watery eyes.  She is alternating between fexofenadine 180 mg daily and cetirizine 10 mg daily for management of allergies with no significant improvement.  Denies " runny nose, nasal congestion, sneezing, cough, fevers, and chills.     Review of Systems   Constitutional: Negative for activity change, appetite change, chills, fever and unexpected weight change.   HENT: Negative for congestion, sneezing and sore throat.    Eyes: Positive for discharge, redness and itching. Negative for photophobia, pain and visual disturbance.   Respiratory: Negative for cough, chest tightness, shortness of breath and wheezing.    Cardiovascular: Negative for chest pain.   Gastrointestinal: Negative for abdominal pain, constipation, diarrhea, nausea and vomiting.   Genitourinary: Negative for dysuria, frequency, hematuria, urgency, vaginal bleeding and vaginal discharge.   Skin: Positive for rash.   Neurological: Negative for dizziness, syncope and headaches.   Psychiatric/Behavioral: Positive for dysphoric mood. Negative for suicidal ideas. The patient is nervous/anxious.          Past Medical History:   Diagnosis Date    Asthma     Hiatal hernia 2009    Hypertension        Patient Active Problem List   Diagnosis    Degeneration of thoracic intervertebral disc    Severe episode of recurrent major depressive disorder, without psychotic features    Generalized anxiety disorder    Essential hypertension    Mild intermittent asthma without complication    Class 3 severe obesity with body mass index (BMI) of 40.0 to 44.9 in adult    Allergic conjunctivitis of both eyes    Dermatitis       Past Surgical History:   Procedure Laterality Date    tonsilectomy         Family History   Problem Relation Age of Onset    Heart failure Paternal Grandmother     Heart disease Maternal Grandmother     Colon cancer Maternal Grandfather     Leukemia Maternal Grandfather     Hypertension Father     Rheum arthritis Mother     Asthma Mother     Heart failure Mother     Hypertension Mother        Social History     Tobacco Use   Smoking Status Former Smoker    Packs/day: 0.50    Types: Cigarettes  "   Last attempt to quit: 4/10/2018    Years since quittin.7       Social History     Social History Narrative    Tobacco: Intermittent use; once per month has 7 cigarettes; Used ages 20 yo-32 yo; max use 0.5 ppd    EtOH: Twice per week, 2 drinks at one time    Drug: None    Employment: Teacher ()     Education: Bachelor's     Lives with  and 10 yo step-son        Objective:        Vitals:    20 1454   BP: 132/80   Pulse: 87   SpO2: 98%   Weight: 128.2 kg (282 lb 10.1 oz)   Height: 5' 7" (1.702 m)     Physical Exam   Constitutional: She is oriented to person, place, and time. No distress.   Morbidly Obese (BMI 44.27)   HENT:   Head: Normocephalic and atraumatic.   Right Ear: External ear normal.   Left Ear: External ear normal.   Nose: Nose normal.   Mouth/Throat: Oropharynx is clear and moist. No oropharyngeal exudate.   Eyes: Conjunctivae and EOM are normal. No scleral icterus. +mild swelling of bilateral upper and lower eyelids. Eyelids also with erythematous, dry, papular rash with overlying scale.   Neck: Normal range of motion. Neck supple. No thyromegaly present.   Cardiovascular: Normal rate, regular rhythm and normal heart sounds. Exam reveals no gallop and no friction rub.   No murmur heard.  Pulmonary/Chest: Effort normal and breath sounds normal. She has no wheezes. She has no rales.   Musculoskeletal: Normal range of motion. She exhibits no edema, tenderness or deformity.   Neurological: She is alert and oriented to person, place, and time. No cranial nerve deficit.   Skin: Skin is warm and dry. No rash noted. No erythema.   Psychiatric: She has a normal mood and affect. Her behavior is normal.     Assessment:       1. Severe episode of recurrent major depressive disorder, without psychotic features    2. Generalized anxiety disorder    3. Dermatitis    4. Allergic conjunctivitis of both eyes    5. Essential hypertension    6. Mild intermittent asthma without complication  "   7. Class 3 severe obesity with body mass index (BMI) of 40.0 to 44.9 in adult, unspecified obesity type, unspecified whether serious comorbidity present        Plan:       Michelle was seen today for follow-up.    Diagnoses and all orders for this visit:    Severe episode of recurrent major depressive disorder, without psychotic features  -     venlafaxine (EFFEXOR-XR) 75 MG 24 hr capsule; Take 1 capsule (75 mg total) by mouth once daily.    Generalized anxiety disorder  -     venlafaxine (EFFEXOR-XR) 75 MG 24 hr capsule; Take 1 capsule (75 mg total) by mouth once daily.    Dermatitis  -     hydrOXYzine HCl (ATARAX) 25 MG tablet; Take 1 tablet (25 mg total) by mouth daily as needed for Itching.  -     hydrocortisone 2.5 % cream; Apply topically 2 (two) times daily. for 7 days  -     methylPREDNISolone (MEDROL, KRISHNA,) 4 mg tablet; use as directed    Allergic conjunctivitis of both eyes  -     methylPREDNISolone (MEDROL, KRISHNA,) 4 mg tablet; use as directed  -     olopatadine (PATANOL) 0.1 % ophthalmic solution; Place 1 drop into both eyes 2 (two) times daily.    Essential hypertension    Mild intermittent asthma without complication  -     albuterol (PROAIR HFA) 90 mcg/actuation inhaler; Inhale 2 puffs into the lungs every 6 (six) hours as needed for Wheezing. Rescue    Class 3 severe obesity with body mass index (BMI) of 40.0 to 44.9 in adult, unspecified obesity type, unspecified whether serious comorbidity present    JAQUELINE/MDD  Symptoms present x 1 year; officially dx'd 11/25/2019  PHQ-9 21; JAQUELINE-7 6 in office 11/25/19  Minimal improvement with Effexor XR 37.5 mg daily over the past 8 weeks. Titrate to 75 mg daily today.   Continue psychotherapy with counselor q1-2 weeks   F/U mood in 4 weeks    Dermatitis (likely atopic) bilateral upper and lower eyelids; AC  --treat acute symptoms with Medrol Dosepak  --hydrocortisone 2.5% to eyelids BID x 7 days   --Continue allegra 180 mg daily or Zyrtec 10 mg daily  --add  hydroxyzine 25 mg HS  --add patanol ophthalmic drops   --If no improvement with above regimen will refer to allergy    HTN  --Controlled with lisinopril 10 mg daily     Asthma; mild-intermittent   --Cont albuterol prn    Morbid Obesity  BMI 44.27  --In future plan to discuss diet/lifestyle modifications including: At least 30 min of CV exercise 5 days a week;  plant-based diet;  small/frequent meals; avoidance of sweetened beverages, limit portion sizes, and healthy carbohydrate options (brown rice, 100% whole wheat bread, wheat pasta)    --Will need to discuss R/B/A bariatric surgery vs bariatric medicine  --[facility] program is also an option   --Would like to optimize mood prior to addressing in full detail    HM   Flu vaccine UTD for 2019/2020 season   Tdap today 11/25/2019  Pap/HPV neg 9/2018     Follow up in about 4 weeks (around 2/20/2020).

## 2020-02-27 ENCOUNTER — OFFICE VISIT (OUTPATIENT)
Dept: OBSTETRICS AND GYNECOLOGY | Facility: CLINIC | Age: 35
End: 2020-02-27
Payer: COMMERCIAL

## 2020-02-27 ENCOUNTER — OFFICE VISIT (OUTPATIENT)
Dept: FAMILY MEDICINE | Facility: CLINIC | Age: 35
End: 2020-02-27
Payer: COMMERCIAL

## 2020-02-27 VITALS
DIASTOLIC BLOOD PRESSURE: 86 MMHG | BODY MASS INDEX: 43.88 KG/M2 | SYSTOLIC BLOOD PRESSURE: 138 MMHG | OXYGEN SATURATION: 98 % | HEIGHT: 67 IN | HEART RATE: 82 BPM | WEIGHT: 279.56 LBS

## 2020-02-27 VITALS
BODY MASS INDEX: 44.06 KG/M2 | SYSTOLIC BLOOD PRESSURE: 142 MMHG | WEIGHT: 281.31 LBS | DIASTOLIC BLOOD PRESSURE: 88 MMHG

## 2020-02-27 DIAGNOSIS — R05.9 COUGH: ICD-10-CM

## 2020-02-27 DIAGNOSIS — Z12.4 SCREENING FOR CERVICAL CANCER: ICD-10-CM

## 2020-02-27 DIAGNOSIS — J45.20 MILD INTERMITTENT ASTHMA WITHOUT COMPLICATION: ICD-10-CM

## 2020-02-27 DIAGNOSIS — Z11.3 SCREENING FOR STD (SEXUALLY TRANSMITTED DISEASE): ICD-10-CM

## 2020-02-27 DIAGNOSIS — F41.1 GENERALIZED ANXIETY DISORDER: Primary | ICD-10-CM

## 2020-02-27 DIAGNOSIS — I10 ESSENTIAL HYPERTENSION: ICD-10-CM

## 2020-02-27 DIAGNOSIS — Z01.419 WELL WOMAN EXAM WITH ROUTINE GYNECOLOGICAL EXAM: Primary | ICD-10-CM

## 2020-02-27 DIAGNOSIS — N92.0 MENORRHAGIA WITH REGULAR CYCLE: ICD-10-CM

## 2020-02-27 DIAGNOSIS — E66.01 CLASS 3 SEVERE OBESITY WITH BODY MASS INDEX (BMI) OF 40.0 TO 44.9 IN ADULT, UNSPECIFIED OBESITY TYPE, UNSPECIFIED WHETHER SERIOUS COMORBIDITY PRESENT: ICD-10-CM

## 2020-02-27 DIAGNOSIS — L30.9 DERMATITIS: ICD-10-CM

## 2020-02-27 DIAGNOSIS — H10.13 ALLERGIC CONJUNCTIVITIS OF BOTH EYES: ICD-10-CM

## 2020-02-27 DIAGNOSIS — F33.2 SEVERE EPISODE OF RECURRENT MAJOR DEPRESSIVE DISORDER, WITHOUT PSYCHOTIC FEATURES: ICD-10-CM

## 2020-02-27 PROCEDURE — 99999 PR PBB SHADOW E&M-EST. PATIENT-LVL III: CPT | Mod: PBBFAC,,, | Performed by: OBSTETRICS & GYNECOLOGY

## 2020-02-27 PROCEDURE — 3079F DIAST BP 80-89 MM HG: CPT | Mod: CPTII,S$GLB,, | Performed by: FAMILY MEDICINE

## 2020-02-27 PROCEDURE — 3075F SYST BP GE 130 - 139MM HG: CPT | Mod: CPTII,S$GLB,, | Performed by: FAMILY MEDICINE

## 2020-02-27 PROCEDURE — 99999 PR PBB SHADOW E&M-EST. PATIENT-LVL III: ICD-10-PCS | Mod: PBBFAC,,, | Performed by: OBSTETRICS & GYNECOLOGY

## 2020-02-27 PROCEDURE — 3079F DIAST BP 80-89 MM HG: CPT | Mod: CPTII,S$GLB,, | Performed by: OBSTETRICS & GYNECOLOGY

## 2020-02-27 PROCEDURE — 88175 CYTOPATH C/V AUTO FLUID REDO: CPT

## 2020-02-27 PROCEDURE — 99214 PR OFFICE/OUTPT VISIT, EST, LEVL IV, 30-39 MIN: ICD-10-PCS | Mod: S$GLB,,, | Performed by: FAMILY MEDICINE

## 2020-02-27 PROCEDURE — 99214 OFFICE O/P EST MOD 30 MIN: CPT | Mod: S$GLB,,, | Performed by: FAMILY MEDICINE

## 2020-02-27 PROCEDURE — 87661 TRICHOMONAS VAGINALIS AMPLIF: CPT

## 2020-02-27 PROCEDURE — 87481 CANDIDA DNA AMP PROBE: CPT | Mod: 59

## 2020-02-27 PROCEDURE — 87491 CHLMYD TRACH DNA AMP PROBE: CPT | Mod: 59

## 2020-02-27 PROCEDURE — 3077F SYST BP >= 140 MM HG: CPT | Mod: CPTII,S$GLB,, | Performed by: OBSTETRICS & GYNECOLOGY

## 2020-02-27 PROCEDURE — 3079F PR MOST RECENT DIASTOLIC BLOOD PRESSURE 80-89 MM HG: ICD-10-PCS | Mod: CPTII,S$GLB,, | Performed by: FAMILY MEDICINE

## 2020-02-27 PROCEDURE — 99395 PR PREVENTIVE VISIT,EST,18-39: ICD-10-PCS | Mod: S$GLB,,, | Performed by: OBSTETRICS & GYNECOLOGY

## 2020-02-27 PROCEDURE — 3008F BODY MASS INDEX DOCD: CPT | Mod: CPTII,S$GLB,, | Performed by: FAMILY MEDICINE

## 2020-02-27 PROCEDURE — 99395 PREV VISIT EST AGE 18-39: CPT | Mod: S$GLB,,, | Performed by: OBSTETRICS & GYNECOLOGY

## 2020-02-27 PROCEDURE — 3008F PR BODY MASS INDEX (BMI) DOCUMENTED: ICD-10-PCS | Mod: CPTII,S$GLB,, | Performed by: FAMILY MEDICINE

## 2020-02-27 PROCEDURE — 99999 PR PBB SHADOW E&M-EST. PATIENT-LVL III: ICD-10-PCS | Mod: PBBFAC,,, | Performed by: FAMILY MEDICINE

## 2020-02-27 PROCEDURE — 3077F PR MOST RECENT SYSTOLIC BLOOD PRESSURE >= 140 MM HG: ICD-10-PCS | Mod: CPTII,S$GLB,, | Performed by: OBSTETRICS & GYNECOLOGY

## 2020-02-27 PROCEDURE — 3079F PR MOST RECENT DIASTOLIC BLOOD PRESSURE 80-89 MM HG: ICD-10-PCS | Mod: CPTII,S$GLB,, | Performed by: OBSTETRICS & GYNECOLOGY

## 2020-02-27 PROCEDURE — 99999 PR PBB SHADOW E&M-EST. PATIENT-LVL III: CPT | Mod: PBBFAC,,, | Performed by: FAMILY MEDICINE

## 2020-02-27 PROCEDURE — 3075F PR MOST RECENT SYSTOLIC BLOOD PRESS GE 130-139MM HG: ICD-10-PCS | Mod: CPTII,S$GLB,, | Performed by: FAMILY MEDICINE

## 2020-02-27 RX ORDER — CEFDINIR 300 MG/1
CAPSULE ORAL
COMMUNITY
Start: 2020-02-14 | End: 2020-02-27

## 2020-02-27 RX ORDER — FLUTICASONE PROPIONATE 44 UG/1
AEROSOL, METERED RESPIRATORY (INHALATION)
COMMUNITY
Start: 2020-02-17 | End: 2020-07-24 | Stop reason: SDUPTHER

## 2020-02-27 RX ORDER — AMOXICILLIN 500 MG/1
CAPSULE ORAL
COMMUNITY
End: 2020-02-27

## 2020-02-27 RX ORDER — PROMETHAZINE HYDROCHLORIDE AND CODEINE PHOSPHATE 6.25; 1 MG/5ML; MG/5ML
SOLUTION ORAL
COMMUNITY
End: 2020-02-27

## 2020-02-27 RX ORDER — LISINOPRIL AND HYDROCHLOROTHIAZIDE 10; 12.5 MG/1; MG/1
TABLET ORAL
COMMUNITY
End: 2020-02-27

## 2020-02-27 RX ORDER — LEVALBUTEROL INHALATION SOLUTION 1.25 MG/3ML
3 SOLUTION RESPIRATORY (INHALATION)
COMMUNITY
End: 2023-12-11 | Stop reason: SDUPTHER

## 2020-02-27 RX ORDER — DICLOFENAC POTASSIUM 50 MG/1
TABLET, FILM COATED ORAL
COMMUNITY
End: 2020-02-27

## 2020-02-27 RX ORDER — OSELTAMIVIR PHOSPHATE 75 MG/1
CAPSULE ORAL
COMMUNITY
End: 2020-02-27

## 2020-02-27 RX ORDER — ACETAMINOPHEN AND CODEINE PHOSPHATE 120; 12 MG/5ML; MG/5ML
1 SOLUTION ORAL DAILY
Qty: 30 TABLET | Refills: 11 | Status: SHIPPED | OUTPATIENT
Start: 2020-02-27 | End: 2021-02-26

## 2020-02-27 RX ORDER — BENZONATATE 100 MG/1
CAPSULE ORAL
COMMUNITY
End: 2020-02-27

## 2020-02-27 RX ORDER — DICLOFENAC SODIUM 10 MG/G
2 GEL TOPICAL
COMMUNITY

## 2020-02-27 RX ORDER — MEDROXYPROGESTERONE ACETATE 150 MG/ML
INJECTION, SUSPENSION INTRAMUSCULAR
COMMUNITY
End: 2020-02-27

## 2020-02-27 RX ORDER — AMOXICILLIN AND CLAVULANATE POTASSIUM 875; 125 MG/1; MG/1
TABLET, FILM COATED ORAL
COMMUNITY
End: 2020-02-27

## 2020-02-27 RX ORDER — ERGOCALCIFEROL 1.25 MG/1
CAPSULE ORAL
COMMUNITY
End: 2020-02-27

## 2020-02-27 RX ORDER — FLUNISOLIDE 0.25 MG/ML
2 SOLUTION NASAL 2 TIMES DAILY
Qty: 25 ML | Refills: 0 | Status: SHIPPED | OUTPATIENT
Start: 2020-02-27 | End: 2021-12-28

## 2020-02-27 RX ORDER — PREDNISONE 20 MG/1
TABLET ORAL
COMMUNITY
End: 2020-02-27

## 2020-02-27 RX ORDER — CEPHALEXIN 500 MG/1
CAPSULE ORAL
COMMUNITY
End: 2020-02-27

## 2020-02-27 RX ORDER — BENZONATATE 100 MG/1
100 CAPSULE ORAL 3 TIMES DAILY PRN
Qty: 30 CAPSULE | Refills: 0 | Status: SHIPPED | OUTPATIENT
Start: 2020-02-27 | End: 2020-03-08

## 2020-02-27 RX ORDER — CODEINE PHOSPHATE AND GUAIFENESIN 10; 100 MG/5ML; MG/5ML
SOLUTION ORAL
COMMUNITY
End: 2020-02-27

## 2020-02-27 RX ORDER — SULFAMETHOXAZOLE AND TRIMETHOPRIM 400; 80 MG/1; MG/1
TABLET ORAL
COMMUNITY
End: 2020-02-27

## 2020-02-27 RX ORDER — LEVONORGESTREL AND ETHINYL ESTRADIOL 0.15-0.03
1 KIT ORAL DAILY
Qty: 28 TABLET | Refills: 11 | Status: SHIPPED | OUTPATIENT
Start: 2020-02-27 | End: 2020-02-27

## 2020-02-27 NOTE — PROGRESS NOTES
"Subjective:       Patient ID: Michelle Izaguirre is a 34 y.o. female.    Chief Complaint: Follow-up    33 yo F, established pt of mine (last visit 1/23/2020), with PMH significant for Asthma, HTN, and newly dx'd MDD and JAQUELINE.  She presents to f/u MDD and JAQUELINE.   Details of previous HPIs reviewed:   ============  --She states that there was no specific trigger for worsening mood over the past 1 year. 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  She complains that the talented in music program has a lot of required paperwork for IEPs. Reports that she struggles to finish tasks and remain organized.  Also feeling very distracted and anxious while working.  Her mind races at night and she has been finding it difficult to sleep---somewhat improved with OTC melatonin p.r.n.  (+) S   (+) I--feels like she wants to get out to participate in activities with her stepson, but can't seem to get off of the sofa.   (+) G--states "I feel like a schlub"   (+/-) E--states energy waxes and wanes   (+)C    (+)A--reports decreased appetite; often overeating out of comfort.   (+)P   (-) SI/HI/AVH  =========  Last visit Venlafaxine XR titrated from 37.5-->75 mg daily as she only had minimal to mild improvement with 37.5 mg dose.  She has not had any adverse effects with medication. She feels that her mood is well-controlled with the current dose of venlafaxine. She initiated psychotherapy with counselor 11/2019; now seeing counselor once every other week.     Previously c/o seasonal allergies affecting her eyes. During last visit she reported  pruritic and painful rash to bilateral upper and lower eyelids that has been persistent since October 2019.  She had also been experiencing watery eyes. symptoms did have drastic improvement with with course of with Medrol Dosepak; hydrocortisone 2.5% to " eyelids BID; Zyrtec 10 mg daily; hydroxyzine 25 mg HS; and patanol ophthalmic drops.  Reports the symptoms have been slowly returning since Medrol Dosepak discontinued.  Continues to use the hydrocortisone to her eyelids every other day.  She is using Zyrtec and patanol daily.  She is using hydroxyzine approximately once per week at bedtime.      Lastly she complains of an ongoing cough that has been present for 1 month.  Earlier in the course of the illness she was treated at [facility]  with an IM steroid injection, albuterol, and steroid inhaler.  She was prescribed an antibiotic, however, she did not take this medication.  States that she had been experiencing posttussive emesis with cough and hoarseness.  Symptoms have been improving over the course of the month, however, she complains that she is unable to sing (especially in higher Octave ranges).  The patient works as a , so this has become extremely frustrating.     Review of Systems   Constitutional: Negative for activity change, appetite change, chills, fever and unexpected weight change.   HENT: Negative for congestion, sneezing and sore throat.    Eyes: Positive for discharge, redness and itching. Negative for photophobia, pain and visual disturbance.   Respiratory: Negative for cough, chest tightness, shortness of breath and wheezing.    Cardiovascular: Negative for chest pain.   Gastrointestinal: Negative for abdominal pain, constipation, diarrhea, nausea and vomiting.   Genitourinary: Negative for dysuria, frequency, hematuria, urgency, vaginal bleeding and vaginal discharge.   Skin: Positive for rash--eyelids.   Neurological: Negative for dizziness, syncope and headaches.   Psychiatric/Behavioral: Negative for dysphoric mood, anxiety, and suicidal ideas.    Past Medical History:   Diagnosis Date    Asthma     Hiatal hernia 2009    Hypertension        Patient Active Problem List   Diagnosis    Degeneration of  "thoracic intervertebral disc    Severe episode of recurrent major depressive disorder, without psychotic features    Generalized anxiety disorder    Essential hypertension    Mild intermittent asthma without complication    Class 3 severe obesity with body mass index (BMI) of 40.0 to 44.9 in adult    Allergic conjunctivitis of both eyes    Dermatitis       Past Surgical History:   Procedure Laterality Date    tonsilectomy         Family History   Problem Relation Age of Onset    Heart failure Paternal Grandmother     Heart disease Maternal Grandmother     Colon cancer Maternal Grandfather     Leukemia Maternal Grandfather     Hypertension Father     Rheum arthritis Mother     Asthma Mother     Heart failure Mother     Hypertension Mother     Breast cancer Neg Hx     Ovarian cancer Neg Hx        Social History     Tobacco Use   Smoking Status Current Every Day Smoker    Packs/day: 0.50    Types: Cigarettes    Last attempt to quit: 4/10/2018    Years since quittin.8   Smokeless Tobacco Never Used       Social History     Social History Narrative    Tobacco: Intermittent use; once per month has 7 cigarettes; Used ages 20 yo-32 yo; max use 0.5 ppd    EtOH: Twice per week, 2 drinks at one time    Drug: None    Employment: Teacher ()     Education: Bachelor's     Lives with  and 8 yo step-son        Objective:        Vitals:    20 1113   BP: 138/86   Pulse: 82   SpO2: 98%   Weight: 126.8 kg (279 lb 8.7 oz)   Height: 5' 7" (1.702 m)         Physical Exam   Constitutional: She is oriented to person, place, and time. No distress.   Morbidly Obese (BMI 43.78)   HENT:   Head: Normocephalic and atraumatic.   Right Ear: External ear normal.   Left Ear: External ear normal.   Nose: Nose with + inflamed/boggy inferior nasal turbinates. + crusting present. No frontal or maxillary sinus tenderness.   Mouth/Throat: Oropharynx is clear and moist. No oropharyngeal exudate.   Eyes: " Conjunctivae and EOM are normal. No scleral icterus. +mild swelling of bilateral upper and lower eyelids. Eyelids also with dry, papular rash  Neck: Normal range of motion. Neck supple. No thyromegaly present.   Cardiovascular: Normal rate, regular rhythm and normal heart sounds. Exam reveals no gallop and no friction rub.   No murmur heard.  Pulmonary/Chest: Effort normal and breath sounds normal. She has no wheezes. She has no rales.   Musculoskeletal: Normal range of motion. She exhibits no edema, tenderness or deformity.   Neurological: She is alert and oriented to person, place, and time. No cranial nerve deficit.   Skin: Skin is warm and dry. No rash noted. No erythema.   Psychiatric: She has a normal mood and affect. Her behavior is normal.     Assessment:       1. Generalized anxiety disorder    2. Severe episode of recurrent major depressive disorder, without psychotic features    3. Allergic conjunctivitis of both eyes    4. Dermatitis    5. Cough    6. Essential hypertension    7. Mild intermittent asthma without complication    8. Class 3 severe obesity with body mass index (BMI) of 40.0 to 44.9 in adult, unspecified obesity type, unspecified whether serious comorbidity present        Plan:       Michelle was seen today for follow-up.    Diagnoses and all orders for this visit:    Generalized anxiety disorder    Severe episode of recurrent major depressive disorder, without psychotic features    Allergic conjunctivitis of both eyes  -     Ambulatory referral/consult to Allergy; Future    Dermatitis  -     Ambulatory referral/consult to Allergy; Future    Cough  -     flunisolide 25 mcg, 0.025%, (NASALIDE) 25 mcg (0.025 %) Spry; 2 sprays by Nasal route 2 (two) times daily.  -     benzonatate (TESSALON) 100 MG capsule; Take 1 capsule (100 mg total) by mouth 3 (three) times daily as needed.    Essential hypertension    Mild intermittent asthma without complication    Class 3 severe obesity with body mass index  (BMI) of 40.0 to 44.9 in adult, unspecified obesity type, unspecified whether serious comorbidity present    JAQUELINE/MDD  Symptoms present x 1 year; officially dx'd 11/25/2019  PHQ-9 21; JAQUELINE-7 6 in office 11/25/19  Pt is now doing well with Effexor XR 75 mg daily   Continue psychotherapy with counselor q1-2 weeks   F/U mood in 12 weeks    Dermatitis (likely atopic) bilateral upper and lower eyelids; AC  Last visit 1/23/2020 she was treated with Medrol Dosepack; hydrocortisone 2.5% to eyelids BID x 7 days; Zyrtec 10 mg daily; hydroxyzine 25 mg HS; and patanol ophthalmic drops   --Symptoms initially improved, however, symptoms have now recurred  --Refer to allergy for further evaluation and management.     Cough; hoarseness  --Ongoing x 4 weeks  --Sounds as if patient had a viral laryngitis and now experiencing post-viral cough.   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  Also advised warm tea with honey and lemon.   --will give trial of Tessalon Perles  --will treat with flunisolide 2 sprays b.i.d. X 2 weeks.  Encouraged to use Afrin b.i.d. prior to flunisolide use x 3 days  --Consider ENT referral if symptoms unimproved over the next 2 weeks    HTN  --Controlled with lisinopril 10 mg daily     Asthma; mild-intermittent   --Cont albuterol prn    Morbid Obesity  BMI 43.78  --In future plan to discuss diet/lifestyle modifications including: At least 30 min of CV exercise 5 days a week;  plant-based diet;  small/frequent meals; avoidance of sweetened beverages, limit portion sizes, and healthy carbohydrate options (brown rice, 100% whole wheat bread, wheat pasta)    --Will need to discuss R/B/A bariatric surgery vs bariatric medicine  --[facility] is also an option   --Would like to optimize mood prior to addressing in full detail    HM   Flu vaccine UTD for 2019/2020 season   Tdap administered 11/25/2019  Pap/HPV neg 9/2018     Follow up if symptoms worsen or fail to improve.    Wellness Assessment DUE

## 2020-02-27 NOTE — PROGRESS NOTES
GYNECOLOGY OFFICE NOTE    Reason for visit: annual    HPI: Pt is a 34 y.o.  female  who presents for annual. Cycle: menarche- 11, Interval-  Q month, Duration- 4-5 days, Flow- normal (changes super + tampons/pad every 3-4 hours for the first 3 days), denies dysmenorrhea. She is sexually active.  She uses no method for contraception. She does desire STI screening. She denies vaginal discharge.  Last pap: 2018- neg pap/hpv, denies hx of abnormal. The use of hormonal contraception has been fully discussed with the patient. We discussed all options including OCPs, transdermal patches, vaginal ring, Depo Provera injections, Nexplanon, and IUD. Warnings about anticipated minor side effects such as breakthrough spotting, nausea, breast tenderness, weight changes, acne, headaches, etc were given.  She has been told of the more serious potential side effects such as MI, stroke, and deep vein thrombosis, all of which are very unlikely.  She has been asked to report any signs of such serious problems immediately. The need for additional protection, such as a condom, to prevent exposure to sexually transmitted diseases has also been discussed- the patient has been clearly reminded that no hormonal contraceptive method can protect her against diseases such as HIV and others. She understands and wishes to begin progesterone only pills.    Past Medical History:   Diagnosis Date    Asthma     Hiatal hernia     Hypertension        Past Surgical History:   Procedure Laterality Date    tonsilectomy         Family History   Problem Relation Age of Onset    Heart failure Paternal Grandmother     Heart disease Maternal Grandmother     Colon cancer Maternal Grandfather     Leukemia Maternal Grandfather     Hypertension Father     Rheum arthritis Mother     Asthma Mother     Heart failure Mother     Hypertension Mother     Breast cancer Neg Hx     Ovarian cancer Neg Hx        Social History     Tobacco  Use    Smoking status: Current Every Day Smoker     Packs/day: 0.50     Types: Cigarettes     Last attempt to quit: 4/10/2018     Years since quittin.8    Smokeless tobacco: Never Used   Substance Use Topics    Alcohol use: Yes     Frequency: Never    Drug use: No       OB History    Para Term  AB Living   0 0 0 0 0 0   SAB TAB Ectopic Multiple Live Births   0 0 0 0 0       Current Outpatient Medications   Medication Sig    albuterol (PROAIR HFA) 90 mcg/actuation inhaler Inhale 2 puffs into the lungs every 6 (six) hours as needed for Wheezing. Rescue    diclofenac sodium (VOLTAREN) 1 % Gel 2 g.    FLOVENT HFA 44 mcg/actuation inhaler INL 2 PFS PO BID    hydrOXYzine HCl (ATARAX) 25 MG tablet Take 1 tablet (25 mg total) by mouth daily as needed for Itching.    levalbuterol (XOPENEX) 1.25 mg/3 mL nebulizer solution 3 mLs.    lisinopril 10 MG tablet Take 1 tablet (10 mg total) by mouth once daily.    olopatadine (PATANOL) 0.1 % ophthalmic solution Place 1 drop into both eyes 2 (two) times daily.    venlafaxine (EFFEXOR-XR) 75 MG 24 hr capsule Take 1 capsule (75 mg total) by mouth once daily.    EPINEPHrine (EPIPEN 2-KRISHNA) 0.3 mg/0.3 mL AtIn Inject into the muscle once.    norethindrone (ORTHO MICRONOR) 0.35 mg tablet Take 1 tablet (0.35 mg total) by mouth once daily.     No current facility-administered medications for this visit.        Allergies: Peanut and Erythromycin     BP (!) 142/88   Wt 127.6 kg (281 lb 4.9 oz)   LMP 2020 (Within Weeks)   BMI 44.06 kg/m²     ROS:  GENERAL: Denies fever or chills.   SKIN: Denies rash or lesions.   HEAD: Denies head injury or headache.   CHEST: Denies chest pain or shortness of breath.   CARDIOVASCULAR: Denies palpitations or chest pain.   ABDOMEN: No constipation, diarrhea, nausea, vomiting or rectal bleeding.   URINARY: No dysuria, hematuria, or burning on urination.  REPRODUCTIVE: See HPI.   BREASTS: see HPI  NEUROLOGIC: Denies syncope or  weakness.     Physical Exam:  GENERAL: alert, appears stated age and cooperative  NEUROLOGIC: orientated to person, place and time, normal mood and affect   CHEST: Normal respiratory effort  NECK: normal appearance  SKIN: no acne, hirsutism  BREAST EXAM: breasts appear normal, no suspicious masses, no skin or nipple changes or axillary nodes  ABDOMEN: abdomen is soft without significant tenderness, masses  EXTERNAL GENITALIA:  normal general appearance  URETHRA: normal urethra, normal urethral meatus  VAGINA:  normal without tenderness, induration or masses  CERVIX:  Normal  UTERUS:  mobile, non-tender  ADNEXA:  normal adnexa, nontender and no masses    Diagnosis:  1. Well woman exam with routine gynecological exam    2. Menorrhagia with regular cycle    3. Screening for cervical cancer    4. Screening for STD (sexually transmitted disease)        Plan:   1. Annual  2. F/u pelvic U/S. If getting labs with PCP  3. Pap today  4. F/u panel    Orders Placed This Encounter    Vaginosis Screen by DNA Probe    C. trachomatis/N. gonorrhoeae by AMP DNA    US Pelvis Comp with Transvag NON-OB (xpd    HIV 1/2 Ag/Ab (4th Gen)    RPR    Hepatitis Panel, Acute    Liquid-Based Pap Smear, Screening    norethindrone (ORTHO MICRONOR) 0.35 mg tablet       Patient was counseled today on the new ACS guidelines for cervical cytology screening as well as the current recommendations for breast cancer screening. She was counseled to follow up with her PCP for other routine health maintenance.     Follow up in about 1 year (around 2/27/2021), or pending U/S results, for annual.      Rama Plummer MD  OB/GYN

## 2020-02-28 LAB
BACTERIAL VAGINOSIS DNA: POSITIVE
C TRACH DNA SPEC QL NAA+PROBE: NOT DETECTED
CANDIDA GLABRATA DNA: NEGATIVE
CANDIDA KRUSEI DNA: NEGATIVE
CANDIDA RRNA VAG QL PROBE: NEGATIVE
N GONORRHOEA DNA SPEC QL NAA+PROBE: NOT DETECTED
T VAGINALIS RRNA GENITAL QL PROBE: NEGATIVE

## 2020-02-28 RX ORDER — METRONIDAZOLE 500 MG/1
500 TABLET ORAL EVERY 12 HOURS
Qty: 14 TABLET | Refills: 0 | Status: SHIPPED | OUTPATIENT
Start: 2020-02-28 | End: 2020-03-06

## 2020-03-05 ENCOUNTER — OFFICE VISIT (OUTPATIENT)
Dept: ALLERGY | Facility: CLINIC | Age: 35
End: 2020-03-05
Payer: COMMERCIAL

## 2020-03-05 ENCOUNTER — LAB VISIT (OUTPATIENT)
Dept: LAB | Facility: HOSPITAL | Age: 35
End: 2020-03-05
Attending: ALLERGY & IMMUNOLOGY
Payer: COMMERCIAL

## 2020-03-05 VITALS — WEIGHT: 284.19 LBS | HEIGHT: 67 IN | BODY MASS INDEX: 44.61 KG/M2

## 2020-03-05 DIAGNOSIS — H10.13 ALLERGIC CONJUNCTIVITIS OF BOTH EYES: ICD-10-CM

## 2020-03-05 DIAGNOSIS — J45.30 MILD PERSISTENT ASTHMA WITHOUT COMPLICATION: ICD-10-CM

## 2020-03-05 DIAGNOSIS — L30.9 DERMATITIS: ICD-10-CM

## 2020-03-05 DIAGNOSIS — J30.9 CHRONIC ALLERGIC RHINITIS: Primary | ICD-10-CM

## 2020-03-05 DIAGNOSIS — J30.9 CHRONIC ALLERGIC RHINITIS: ICD-10-CM

## 2020-03-05 PROCEDURE — 99999 PR PBB SHADOW E&M-EST. PATIENT-LVL III: CPT | Mod: PBBFAC,,, | Performed by: ALLERGY & IMMUNOLOGY

## 2020-03-05 PROCEDURE — 99999 PR PBB SHADOW E&M-EST. PATIENT-LVL III: ICD-10-PCS | Mod: PBBFAC,,, | Performed by: ALLERGY & IMMUNOLOGY

## 2020-03-05 PROCEDURE — 86003 ALLG SPEC IGE CRUDE XTRC EA: CPT | Mod: 59

## 2020-03-05 PROCEDURE — 86008 ALLG SPEC IGE RECOMB EA: CPT

## 2020-03-05 PROCEDURE — 99244 OFF/OP CNSLTJ NEW/EST MOD 40: CPT | Mod: S$GLB,,, | Performed by: ALLERGY & IMMUNOLOGY

## 2020-03-05 PROCEDURE — 99244 PR OFFICE CONSULTATION,LEVEL IV: ICD-10-PCS | Mod: S$GLB,,, | Performed by: ALLERGY & IMMUNOLOGY

## 2020-03-05 PROCEDURE — 36415 COLL VENOUS BLD VENIPUNCTURE: CPT | Mod: PO

## 2020-03-05 RX ORDER — CETIRIZINE HYDROCHLORIDE 10 MG/1
10 TABLET ORAL DAILY
COMMUNITY
End: 2024-03-10

## 2020-03-05 RX ORDER — LEVONORGESTREL AND ETHINYL ESTRADIOL 0.15-0.03
KIT ORAL
COMMUNITY
Start: 2020-02-27 | End: 2021-02-26

## 2020-03-05 NOTE — PROGRESS NOTES
Subjective:       Patient ID: Michelle Izaguirre is a 34 y.o. female.    Chief Complaint:  Allergies (itchy watery dry eyes, PND , throat clearing ) and Cough      33 yo woman presents for consult from Dr Juanjo Geronimo for possible allergies. She states had for life. Her main issues are her eyes. She has itchy watery eyes and the lids itch, get red and swollen and then dry and peel. Also has PND and congestion, not much sneeze runny nose. Does have asthma and gets SOB and tightness. will go weeks without inhaler but when flares uses daily. Not used in a week now. Diagnosed asthma at 8, never been in hospital. She is worse when weather changes. No time of day worse. worse with cats and dust. No difference inside or out. She is on Flovent 44 2 puff BID, zyrtec daily. And albuterol as needed. Just got prescription for nasal steroid but not started. No eczema. She has peanut allergy. Not congested in decades so not sure reaction but if touches get itchy rash. fine with tree nuts. She has HTN but no other medical issues. Had T&A not sinus surgery.       Environmental History: see history section for home environment  Review of Systems   Constitutional: Negative for appetite change, chills, fatigue and fever.   HENT: Positive for congestion, facial swelling and postnasal drip. Negative for ear discharge, ear pain, nosebleeds, rhinorrhea, sinus pressure, sneezing, sore throat, trouble swallowing and voice change.    Eyes: Positive for discharge, redness and itching. Negative for visual disturbance.   Respiratory: Positive for cough, chest tightness, shortness of breath and wheezing. Negative for choking.    Cardiovascular: Negative for chest pain, palpitations and leg swelling.   Gastrointestinal: Negative for abdominal distention, abdominal pain, constipation, diarrhea, nausea and vomiting.   Genitourinary: Negative for difficulty urinating.   Musculoskeletal: Negative for arthralgias, gait problem, joint swelling and  myalgias.   Skin: Negative for color change and rash.   Neurological: Negative for dizziness, syncope, weakness, light-headedness and headaches.   Hematological: Negative for adenopathy. Does not bruise/bleed easily.   Psychiatric/Behavioral: Negative for agitation, behavioral problems, confusion and sleep disturbance. The patient is not nervous/anxious.         Objective:      Physical Exam   Constitutional: She is oriented to person, place, and time. She appears well-developed and well-nourished. No distress.   HENT:   Head: Normocephalic and atraumatic.   Right Ear: Hearing, tympanic membrane, external ear and ear canal normal.   Left Ear: Hearing, tympanic membrane, external ear and ear canal normal.   Nose: No mucosal edema, rhinorrhea, sinus tenderness or septal deviation. No epistaxis. Right sinus exhibits no maxillary sinus tenderness and no frontal sinus tenderness. Left sinus exhibits no maxillary sinus tenderness and no frontal sinus tenderness.   Mouth/Throat: Uvula is midline, oropharynx is clear and moist and mucous membranes are normal. No uvula swelling.   Eyes: Conjunctivae are normal. Right eye exhibits no discharge. Left eye exhibits no discharge.   Neck: Normal range of motion. No thyromegaly present.   Cardiovascular: Normal rate, regular rhythm and normal heart sounds.   No murmur heard.  Pulmonary/Chest: Effort normal and breath sounds normal. No respiratory distress. She has no wheezes.   Abdominal: Soft. She exhibits no distension. There is no tenderness.   Musculoskeletal: Normal range of motion. She exhibits no edema or tenderness.   Lymphadenopathy:     She has no cervical adenopathy.   Neurological: She is alert and oriented to person, place, and time.   Skin: Skin is warm and dry. No rash noted. No erythema.   Psychiatric: She has a normal mood and affect. Her behavior is normal. Judgment and thought content normal.   Nursing note and vitals reviewed.      Laboratory:   none performed    Assessment:       1. Chronic allergic rhinitis    2. Allergic conjunctivitis of both eyes    3. Dermatitis    4. Mild persistent asthma without complication         Plan:       1. Immunocaps  2. Start flunisolide 2 SEN daily  3. continue zyrtec daily  4. after labs consider montelukast  5. Flovent 44 mcg 2 puff BID and albuterol as needed, after allergy labs may need PFT  6. Phone review  7. Dr Geronimo notified of completed consult via [employer]

## 2020-03-05 NOTE — LETTER
March 5, 2020      Juanjo Geronimo MD  [address]  [address]  [place] [address]           [facility]  [address]  [place] [address]  Phone: [phone]          Patient: Michelle Izaguirre   MR Number: [id number]   YOB: 1985   Date of Visit: 3/5/2020       Dear Dr. Juanjo Geronimo:    Thank you for referring Michelle Izaguirre to me for evaluation. Attached you will find relevant portions of my assessment and plan of care.    If you have questions, please do not hesitate to call me. I look forward to following Michelle Izaguirre along with you.    Sincerely,    Padmini Lara MD    Enclosure  CC:  No Recipients    If you would like to receive this communication electronically, please contact [email or web address] or [phone] to request more information on [employer] Link access.    For providers and/or their staff who would like to refer a patient to [facility], please contact us through our one-stop-shop provider referral line, [facility] [place], at [phone].    If you feel you have received this communication in error or would no longer like to receive these types of communications, please e-mail [email or web address]

## 2020-03-09 LAB
ANNOTATION COMMENT IMP: NORMAL
DEPRECATED MISC ALLERGEN IGE RAST QL: NORMAL
PATHOLOGY STUDY: NORMAL
PEANUT (RARA H) 1 IGE QN: <0.1 KU/L
PEANUT (RARA H) 2 IGE QN: <0.1 KU/L
PEANUT (RARA H) 3 IGE QN: <0.1 KU/L
PEANUT (RARA H) 6 IGE QN: <0.1 KU/L
PEANUT (RARA H) 8 IGE QN: <0.1 KU/L
PEANUT (RARA H) 9 IGE QN: <0.1 KU/L
PEANUT IGE QN: <0.1 KU/L

## 2020-03-10 LAB
A ALTERNATA IGE QN: <0.1 KU/L
A FUMIGATUS IGE QN: <0.1 KU/L
ALLERGEN CHAETOMIUM GLOBOSUM IGE: <0.1 KU/L
ALLERGEN LATEX IGE: <0.1 KU/L
ALLERGEN WALNUT TREE IGE: <0.1 KU/L
ALLERGEN WHITE PINE TREE IGE: <0.1 KU/L
B CINEREA IGE QN: <0.1 KU/L
BAHIA GRASS IGE QN: <0.1 KU/L
BALD CYPRESS IGE QN: <0.1 KU/L
BERMUDA GRASS IGE QN: <0.1 KU/L
C HERBARUM IGE QN: <0.1 KU/L
C LUNATA IGE QN: <0.1 KU/L
CAT DANDER IGE QN: <0.1 KU/L
CHAETOMIUM GLOB. CLASS: NORMAL
COMMON RAGWEED IGE QN: <0.1 KU/L
COTTONWOOD IGE QN: <0.1 KU/L
D FARINAE IGE QN: 25.9 KU/L
D PTERONYSS IGE QN: 23.8 KU/L
DEPRECATED A ALTERNATA IGE RAST QL: NORMAL
DEPRECATED A FUMIGATUS IGE RAST QL: NORMAL
DEPRECATED B CINEREA IGE RAST QL: NORMAL
DEPRECATED BAHIA GRASS IGE RAST QL: NORMAL
DEPRECATED BALD CYPRESS IGE RAST QL: NORMAL
DEPRECATED BERMUDA GRASS IGE RAST QL: NORMAL
DEPRECATED C HERBARUM IGE RAST QL: NORMAL
DEPRECATED C LUNATA IGE RAST QL: NORMAL
DEPRECATED CAT DANDER IGE RAST QL: NORMAL
DEPRECATED COMMON RAGWEED IGE RAST QL: NORMAL
DEPRECATED COTTONWOOD IGE RAST QL: NORMAL
DEPRECATED D FARINAE IGE RAST QL: ABNORMAL
DEPRECATED D PTERONYSS IGE RAST QL: ABNORMAL
DEPRECATED DOG DANDER IGE RAST QL: ABNORMAL
DEPRECATED ELDER IGE RAST QL: NORMAL
DEPRECATED ENGL PLANTAIN IGE RAST QL: NORMAL
DEPRECATED HORSE DANDER IGE RAST QL: NORMAL
DEPRECATED JOHNSON GRASS IGE RAST QL: NORMAL
DEPRECATED LONDON PLANE IGE RAST QL: NORMAL
DEPRECATED MUGWORT IGE RAST QL: NORMAL
DEPRECATED P BETAE IGE RAST QL: NORMAL
DEPRECATED P NOTATUM IGE RAST QL: NORMAL
DEPRECATED PEANUT IGE RAST QL: NORMAL
DEPRECATED PECAN/HICK TREE IGE RAST QL: NORMAL
DEPRECATED ROACH IGE RAST QL: NORMAL
DEPRECATED S ROSTRATA IGE RAST QL: NORMAL
DEPRECATED SALTWORT IGE RAST QL: NORMAL
DEPRECATED SILVER BIRCH IGE RAST QL: NORMAL
DEPRECATED TIMOTHY IGE RAST QL: NORMAL
DEPRECATED WEST RAGWEED IGE RAST QL: NORMAL
DEPRECATED WHITE OAK IGE RAST QL: NORMAL
DEPRECATED WILLOW IGE RAST QL: NORMAL
DOG DANDER IGE QN: 0.82 KU/L
ELDER IGE QN: <0.1 KU/L
ENGL PLANTAIN IGE QN: <0.1 KU/L
HORSE DANDER IGE QN: <0.1 KU/L
JOHNSON GRASS IGE QN: <0.1 KU/L
LATEX CLASS: NORMAL
LONDON PLANE IGE QN: <0.1 KU/L
MUGWORT IGE QN: <0.1 KU/L
P BETAE IGE QN: <0.1 KU/L
P NOTATUM IGE QN: <0.1 KU/L
PEANUT IGE QN: <0.1 KU/L
PECAN/HICK TREE IGE QN: <0.1 KU/L
ROACH IGE QN: <0.1 KU/L
S ROSTRATA IGE QN: <0.1 KU/L
SALTWORT IGE QN: <0.1 KU/L
SILVER BIRCH IGE QN: <0.1 KU/L
TIMOTHY IGE QN: <0.1 KU/L
WALNUT TREE CLASS: NORMAL
WEST RAGWEED IGE QN: <0.1 KU/L
WHITE OAK IGE QN: <0.1 KU/L
WHITE PINE CLASS: NORMAL
WILLOW IGE QN: <0.1 KU/L

## 2020-03-13 ENCOUNTER — PATIENT MESSAGE (OUTPATIENT)
Dept: ALLERGY | Facility: CLINIC | Age: 35
End: 2020-03-13

## 2020-03-23 DIAGNOSIS — I10 ESSENTIAL HYPERTENSION: Primary | ICD-10-CM

## 2020-03-23 RX ORDER — LISINOPRIL 10 MG/1
10 TABLET ORAL DAILY
Qty: 90 TABLET | Refills: 0 | Status: SHIPPED | OUTPATIENT
Start: 2020-03-23 | End: 2020-07-24 | Stop reason: SDUPTHER

## 2020-03-24 ENCOUNTER — LAB VISIT (OUTPATIENT)
Dept: LAB | Facility: HOSPITAL | Age: 35
End: 2020-03-24
Attending: FAMILY MEDICINE
Payer: COMMERCIAL

## 2020-03-24 DIAGNOSIS — I10 ESSENTIAL HYPERTENSION: ICD-10-CM

## 2020-03-24 LAB
ALBUMIN SERPL BCP-MCNC: 3.6 G/DL (ref 3.5–5.2)
ALP SERPL-CCNC: 58 U/L (ref 55–135)
ALT SERPL W/O P-5'-P-CCNC: 15 U/L (ref 10–44)
ANION GAP SERPL CALC-SCNC: 10 MMOL/L (ref 8–16)
AST SERPL-CCNC: 13 U/L (ref 10–40)
BASOPHILS # BLD AUTO: 0.07 K/UL (ref 0–0.2)
BASOPHILS NFR BLD: 0.5 % (ref 0–1.9)
BILIRUB SERPL-MCNC: 0.4 MG/DL (ref 0.1–1)
BUN SERPL-MCNC: 12 MG/DL (ref 6–20)
CALCIUM SERPL-MCNC: 9.5 MG/DL (ref 8.7–10.5)
CHLORIDE SERPL-SCNC: 106 MMOL/L (ref 95–110)
CHOLEST SERPL-MCNC: 214 MG/DL (ref 120–199)
CHOLEST/HDLC SERPL: 4.3 {RATIO} (ref 2–5)
CO2 SERPL-SCNC: 23 MMOL/L (ref 23–29)
CREAT SERPL-MCNC: 0.8 MG/DL (ref 0.5–1.4)
DIFFERENTIAL METHOD: ABNORMAL
EOSINOPHIL # BLD AUTO: 0.6 K/UL (ref 0–0.5)
EOSINOPHIL NFR BLD: 4.2 % (ref 0–8)
ERYTHROCYTE [DISTWIDTH] IN BLOOD BY AUTOMATED COUNT: 14.5 % (ref 11.5–14.5)
EST. GFR  (AFRICAN AMERICAN): >60 ML/MIN/1.73 M^2
EST. GFR  (NON AFRICAN AMERICAN): >60 ML/MIN/1.73 M^2
ESTIMATED AVG GLUCOSE: 108 MG/DL (ref 68–131)
GLUCOSE SERPL-MCNC: 80 MG/DL (ref 70–110)
HBA1C MFR BLD HPLC: 5.4 % (ref 4–5.6)
HCT VFR BLD AUTO: 39.4 % (ref 37–48.5)
HDLC SERPL-MCNC: 50 MG/DL (ref 40–75)
HDLC SERPL: 23.4 % (ref 20–50)
HGB BLD-MCNC: 12.5 G/DL (ref 12–16)
IMM GRANULOCYTES # BLD AUTO: 0.04 K/UL (ref 0–0.04)
IMM GRANULOCYTES NFR BLD AUTO: 0.3 % (ref 0–0.5)
LDLC SERPL CALC-MCNC: 134.8 MG/DL (ref 63–159)
LYMPHOCYTES # BLD AUTO: 3.6 K/UL (ref 1–4.8)
LYMPHOCYTES NFR BLD: 25.5 % (ref 18–48)
MCH RBC QN AUTO: 26.8 PG (ref 27–31)
MCHC RBC AUTO-ENTMCNC: 31.7 G/DL (ref 32–36)
MCV RBC AUTO: 85 FL (ref 82–98)
MONOCYTES # BLD AUTO: 0.9 K/UL (ref 0.3–1)
MONOCYTES NFR BLD: 6.4 % (ref 4–15)
NEUTROPHILS # BLD AUTO: 8.8 K/UL (ref 1.8–7.7)
NEUTROPHILS NFR BLD: 63.1 % (ref 38–73)
NONHDLC SERPL-MCNC: 164 MG/DL
NRBC BLD-RTO: 0 /100 WBC
PLATELET # BLD AUTO: 456 K/UL (ref 150–350)
PMV BLD AUTO: 9.6 FL (ref 9.2–12.9)
POTASSIUM SERPL-SCNC: 4.1 MMOL/L (ref 3.5–5.1)
PROT SERPL-MCNC: 7.7 G/DL (ref 6–8.4)
RBC # BLD AUTO: 4.66 M/UL (ref 4–5.4)
SODIUM SERPL-SCNC: 139 MMOL/L (ref 136–145)
TRIGL SERPL-MCNC: 146 MG/DL (ref 30–150)
WBC # BLD AUTO: 13.94 K/UL (ref 3.9–12.7)

## 2020-03-24 PROCEDURE — 85025 COMPLETE CBC W/AUTO DIFF WBC: CPT

## 2020-03-24 PROCEDURE — 83036 HEMOGLOBIN GLYCOSYLATED A1C: CPT

## 2020-03-24 PROCEDURE — 80061 LIPID PANEL: CPT

## 2020-03-24 PROCEDURE — 80053 COMPREHEN METABOLIC PANEL: CPT

## 2020-03-25 ENCOUNTER — PATIENT MESSAGE (OUTPATIENT)
Dept: FAMILY MEDICINE | Facility: CLINIC | Age: 35
End: 2020-03-25

## 2020-03-27 LAB
FINAL PATHOLOGIC DIAGNOSIS: NORMAL
Lab: NORMAL

## 2020-04-23 DIAGNOSIS — F41.1 GENERALIZED ANXIETY DISORDER: ICD-10-CM

## 2020-04-23 DIAGNOSIS — F33.2 SEVERE EPISODE OF RECURRENT MAJOR DEPRESSIVE DISORDER, WITHOUT PSYCHOTIC FEATURES: ICD-10-CM

## 2020-04-23 RX ORDER — VENLAFAXINE HYDROCHLORIDE 75 MG/1
75 CAPSULE, EXTENDED RELEASE ORAL DAILY
Qty: 90 CAPSULE | Refills: 1 | Status: SHIPPED | OUTPATIENT
Start: 2020-04-23 | End: 2020-07-24 | Stop reason: SDUPTHER

## 2020-07-24 ENCOUNTER — PATIENT MESSAGE (OUTPATIENT)
Dept: FAMILY MEDICINE | Facility: CLINIC | Age: 35
End: 2020-07-24

## 2020-07-24 ENCOUNTER — OFFICE VISIT (OUTPATIENT)
Dept: FAMILY MEDICINE | Facility: CLINIC | Age: 35
End: 2020-07-24
Attending: FAMILY MEDICINE
Payer: COMMERCIAL

## 2020-07-24 VITALS
HEIGHT: 67 IN | TEMPERATURE: 99 F | SYSTOLIC BLOOD PRESSURE: 110 MMHG | WEIGHT: 284.63 LBS | BODY MASS INDEX: 44.67 KG/M2 | HEART RATE: 80 BPM | DIASTOLIC BLOOD PRESSURE: 70 MMHG | OXYGEN SATURATION: 98 %

## 2020-07-24 DIAGNOSIS — F41.1 GENERALIZED ANXIETY DISORDER: ICD-10-CM

## 2020-07-24 DIAGNOSIS — F33.2 SEVERE EPISODE OF RECURRENT MAJOR DEPRESSIVE DISORDER, WITHOUT PSYCHOTIC FEATURES: ICD-10-CM

## 2020-07-24 DIAGNOSIS — Z00.00 ROUTINE GENERAL MEDICAL EXAMINATION AT HEALTH CARE FACILITY: Primary | ICD-10-CM

## 2020-07-24 DIAGNOSIS — J45.20 MILD INTERMITTENT ASTHMA WITHOUT COMPLICATION: ICD-10-CM

## 2020-07-24 DIAGNOSIS — I10 ESSENTIAL HYPERTENSION: ICD-10-CM

## 2020-07-24 PROCEDURE — 3074F PR MOST RECENT SYSTOLIC BLOOD PRESSURE < 130 MM HG: ICD-10-PCS | Mod: CPTII,S$GLB,, | Performed by: FAMILY MEDICINE

## 2020-07-24 PROCEDURE — 3078F DIAST BP <80 MM HG: CPT | Mod: CPTII,S$GLB,, | Performed by: FAMILY MEDICINE

## 2020-07-24 PROCEDURE — 99999 PR PBB SHADOW E&M-EST. PATIENT-LVL IV: CPT | Mod: PBBFAC,,, | Performed by: FAMILY MEDICINE

## 2020-07-24 PROCEDURE — 3078F PR MOST RECENT DIASTOLIC BLOOD PRESSURE < 80 MM HG: ICD-10-PCS | Mod: CPTII,S$GLB,, | Performed by: FAMILY MEDICINE

## 2020-07-24 PROCEDURE — 99395 PR PREVENTIVE VISIT,EST,18-39: ICD-10-PCS | Mod: S$GLB,,, | Performed by: FAMILY MEDICINE

## 2020-07-24 PROCEDURE — 3074F SYST BP LT 130 MM HG: CPT | Mod: CPTII,S$GLB,, | Performed by: FAMILY MEDICINE

## 2020-07-24 PROCEDURE — 99395 PREV VISIT EST AGE 18-39: CPT | Mod: S$GLB,,, | Performed by: FAMILY MEDICINE

## 2020-07-24 PROCEDURE — 99999 PR PBB SHADOW E&M-EST. PATIENT-LVL IV: ICD-10-PCS | Mod: PBBFAC,,, | Performed by: FAMILY MEDICINE

## 2020-07-24 RX ORDER — FLUTICASONE PROPIONATE 44 UG/1
AEROSOL, METERED RESPIRATORY (INHALATION)
Qty: 10.6 G | Refills: 3 | Status: SHIPPED | OUTPATIENT
Start: 2020-07-24

## 2020-07-24 RX ORDER — LISINOPRIL 10 MG/1
10 TABLET ORAL DAILY
Qty: 90 TABLET | Refills: 3 | Status: SHIPPED | OUTPATIENT
Start: 2020-07-24 | End: 2021-08-22 | Stop reason: SDUPTHER

## 2020-07-24 RX ORDER — VENLAFAXINE HYDROCHLORIDE 75 MG/1
75 CAPSULE, EXTENDED RELEASE ORAL DAILY
Qty: 90 CAPSULE | Refills: 3 | Status: SHIPPED | OUTPATIENT
Start: 2020-07-24 | End: 2021-10-28

## 2020-07-24 NOTE — PROGRESS NOTES
Subjective:       Patient ID: Michelle Izaguirre is a 34 y.o. female.    Chief Complaint: Annual Exam    34 yr old pleasant white female with hypertension, asthma, obesity, eczema, anxiety, depression and no other significant medical history presents today as new patient to me and for her annual wellness check and lab work. She is a patient of Dr Geronimo. No concerns today. She teaches music.    HTN - controlled - on lisinopril 10 daily - compliant - no side effects       Asthma - controlled - on maintenance flovent and albuterol as needed - had flare up in January and doing well since then    Depression/anxiety - controlled - on effexor daily - compliant - no side effects       Eczema - controlled - on steroid cream as needed       History as below - reviewed     Review of Systems   Constitutional: Negative.  Negative for activity change, diaphoresis and unexpected weight change.   HENT: Negative.  Negative for nasal congestion, ear discharge, hearing loss, rhinorrhea, sore throat, trouble swallowing and voice change.    Eyes: Negative.  Negative for pain, discharge and visual disturbance.   Respiratory: Negative.  Negative for chest tightness, shortness of breath and wheezing.    Cardiovascular: Negative.  Negative for chest pain and palpitations.   Gastrointestinal: Negative.  Negative for abdominal distention, anal bleeding, blood in stool, constipation, diarrhea, nausea and vomiting.   Endocrine: Negative.  Negative for cold intolerance, polydipsia and polyuria.   Genitourinary: Negative.  Negative for decreased urine volume, difficulty urinating, dysuria, frequency, hematuria, menstrual problem and vaginal pain.   Musculoskeletal: Negative.  Negative for arthralgias, gait problem, joint swelling, myalgias and neck pain.   Integumentary:  Positive for rash. Negative for color change, pallor and wound.   Allergic/Immunologic: Negative.  Negative for environmental allergies and immunocompromised state.    Neurological: Negative.  Negative for dizziness, tremors, seizures, speech difficulty, weakness and headaches.   Hematological: Negative.  Negative for adenopathy. Does not bruise/bleed easily.   Psychiatric/Behavioral: Negative.  Negative for agitation, confusion, decreased concentration, dysphoric mood, hallucinations, self-injury and suicidal ideas. The patient is not nervous/anxious.          Active Ambulatory Problems     Diagnosis Date Noted    Degeneration of thoracic intervertebral disc 11/25/2019    Severe episode of recurrent major depressive disorder, without psychotic features 01/23/2020    Generalized anxiety disorder 01/23/2020    Essential hypertension 01/23/2020    Mild intermittent asthma without complication 01/23/2020    Class 3 severe obesity with body mass index (BMI) of 40.0 to 44.9 in adult 01/23/2020    Allergic conjunctivitis of both eyes 01/23/2020    Dermatitis 01/23/2020     Resolved Ambulatory Problems     Diagnosis Date Noted    No Resolved Ambulatory Problems     Past Medical History:   Diagnosis Date    Allergy     Asthma     Eczema     Hiatal hernia 2009    Hypertension      Past Surgical History:   Procedure Laterality Date    tonsilectomy      TONSILLECTOMY       Family History   Problem Relation Age of Onset    Heart failure Paternal Grandmother     Heart disease Maternal Grandmother     Colon cancer Maternal Grandfather     Leukemia Maternal Grandfather     Hypertension Father     Asthma Father     Allergies Father     Rheum arthritis Mother     Asthma Mother     Heart failure Mother     Hypertension Mother     Allergies Mother     Eczema Mother     Breast cancer Neg Hx     Ovarian cancer Neg Hx     Allergic rhinitis Neg Hx     Angioedema Neg Hx     Atopy Neg Hx     Immunodeficiency Neg Hx     Rhinitis Neg Hx     Urticaria Neg Hx      Social History     Socioeconomic History    Marital status:      Spouse name: Not on file    Number  of children: Not on file    Years of education: Not on file    Highest education level: Not on file   Occupational History    Not on file   Social Needs    Financial resource strain: Not very hard    Food insecurity     Worry: Never true     Inability: Never true    Transportation needs     Medical: No     Non-medical: No   Tobacco Use    Smoking status: Current Every Day Smoker     Packs/day: 0.50     Types: Cigarettes     Last attempt to quit: 4/10/2018     Years since quittin.2    Smokeless tobacco: Never Used   Substance and Sexual Activity    Alcohol use: Yes     Frequency: 2-4 times a month     Drinks per session: 1 or 2     Binge frequency: Less than monthly    Drug use: No    Sexual activity: Yes     Partners: Male     Birth control/protection: None   Lifestyle    Physical activity     Days per week: 1 day     Minutes per session: 20 min    Stress: Rather much   Relationships    Social connections     Talks on phone: Twice a week     Gets together: Patient refused     Attends Zoroastrianism service: Not on file     Active member of club or organization: No     Attends meetings of clubs or organizations: Never     Relationship status:    Other Topics Concern    Not on file   Social History Narrative    Tobacco: Intermittent use; once per month has 7 cigarettes; Used ages 18 yo-32 yo; max use 0.5 ppd    EtOH: Twice per week, 2 drinks at one time    Drug: None    Employment: Teacher ()     Education: Bachelor's     Lives with  and 8 yo step-son      Review of patient's allergies indicates:   Allergen Reactions    Peanut     Erythromycin Rash     Current Outpatient Medications on File Prior to Visit   Medication Sig Dispense Refill    albuterol (PROAIR HFA) 90 mcg/actuation inhaler Inhale 2 puffs into the lungs every 6 (six) hours as needed for Wheezing. Rescue 18 g 2    cetirizine (ZYRTEC) 10 MG tablet Take 10 mg by mouth once daily.      diclofenac sodium  (VOLTAREN) 1 % Gel 2 g.      flunisolide 25 mcg, 0.025%, (NASALIDE) 25 mcg (0.025 %) Spry 2 sprays by Nasal route 2 (two) times daily. 25 mL 0    hydrOXYzine HCl (ATARAX) 25 MG tablet Take 1 tablet (25 mg total) by mouth daily as needed for Itching. 30 tablet 0    KURVELO, 28, 0.15-0.03 mg per tablet       levalbuterol (XOPENEX) 1.25 mg/3 mL nebulizer solution 3 mLs.      norethindrone (ORTHO MICRONOR) 0.35 mg tablet Take 1 tablet (0.35 mg total) by mouth once daily. 30 tablet 11    olopatadine (PATANOL) 0.1 % ophthalmic solution Place 1 drop into both eyes 2 (two) times daily. 5 mL 1    [DISCONTINUED] FLOVENT HFA 44 mcg/actuation inhaler INL 2 PFS PO BID      [DISCONTINUED] lisinopriL 10 MG tablet Take 1 tablet (10 mg total) by mouth once daily. 90 tablet 0    [DISCONTINUED] venlafaxine (EFFEXOR-XR) 75 MG 24 hr capsule Take 1 capsule (75 mg total) by mouth once daily. 90 capsule 1    EPINEPHrine (EPIPEN 2-KRISHNA) 0.3 mg/0.3 mL AtIn Inject into the muscle once.       No current facility-administered medications on file prior to visit.        Objective:       Vitals:    07/24/20 0918   BP: 110/70   Pulse: 80   Temp: 98.9 °F (37.2 °C)       Physical Exam  Constitutional:       General: She is not in acute distress.     Appearance: She is well-developed. She is not diaphoretic.   HENT:      Head: Normocephalic and atraumatic.      Right Ear: External ear normal.      Left Ear: External ear normal.      Nose: Nose normal.      Mouth/Throat:      Pharynx: No oropharyngeal exudate.   Eyes:      General: No scleral icterus.        Right eye: No discharge.         Left eye: No discharge.      Conjunctiva/sclera: Conjunctivae normal.      Pupils: Pupils are equal, round, and reactive to light.   Neck:      Musculoskeletal: Normal range of motion and neck supple.      Thyroid: No thyromegaly.      Vascular: No JVD.      Trachea: No tracheal deviation.   Cardiovascular:      Rate and Rhythm: Normal rate and regular  rhythm.      Heart sounds: Normal heart sounds. No murmur. No friction rub. No gallop.    Pulmonary:      Effort: Pulmonary effort is normal.      Breath sounds: Normal breath sounds. No stridor. No wheezing or rales.   Chest:      Chest wall: No tenderness.   Abdominal:      General: Bowel sounds are normal. There is no distension.      Palpations: Abdomen is soft. There is no mass.      Tenderness: There is no abdominal tenderness. There is no guarding or rebound.      Hernia: No hernia is present.   Musculoskeletal: Normal range of motion.         General: No tenderness.   Lymphadenopathy:      Cervical: No cervical adenopathy.   Skin:     General: Skin is warm and dry.      Coloration: Skin is not pale.      Findings: Rash (mild eczema fingers) present. No erythema.   Neurological:      Mental Status: She is alert and oriented to person, place, and time.      Cranial Nerves: No cranial nerve deficit.      Motor: No abnormal muscle tone.      Coordination: Coordination normal.      Deep Tendon Reflexes: Reflexes are normal and symmetric. Reflexes normal.   Psychiatric:         Behavior: Behavior normal.         Thought Content: Thought content normal.         Judgment: Judgment normal.         Assessment:       1. Routine general medical examination at health care facility    2. Essential hypertension    3. Mild intermittent asthma without complication    4. Generalized anxiety disorder    5. Severe episode of recurrent major depressive disorder, without psychotic features        Plan:           Michelle was seen today for annual exam.    Diagnoses and all orders for this visit:    Routine general medical examination at health care facility    Essential hypertension  -     lisinopriL 10 MG tablet; Take 1 tablet (10 mg total) by mouth once daily.    Mild intermittent asthma without complication  -     FLOVENT HFA 44 mcg/actuation inhaler; INL 2 PFS PO BID    Generalized anxiety disorder  -     venlafaxine  (EFFEXOR-XR) 75 MG 24 hr capsule; Take 1 capsule (75 mg total) by mouth once daily.    Severe episode of recurrent major depressive disorder, without psychotic features  -     venlafaxine (EFFEXOR-XR) 75 MG 24 hr capsule; Take 1 capsule (75 mg total) by mouth once daily.      Wellness check  -normal exam  -labs    HTN  -controlled    JAQUELINE/depression  -controlled    Obesity  -diet/exercise and weight loss    Spent adequate time in obtaining history and explaining differentials    Follow up in about 6 months (around 1/24/2021), or if symptoms worsen or fail to improve.

## 2020-08-12 ENCOUNTER — OFFICE VISIT (OUTPATIENT)
Dept: URGENT CARE | Facility: CLINIC | Age: 35
End: 2020-08-12
Payer: COMMERCIAL

## 2020-08-12 VITALS
TEMPERATURE: 99 F | OXYGEN SATURATION: 97 % | RESPIRATION RATE: 18 BRPM | HEIGHT: 67 IN | DIASTOLIC BLOOD PRESSURE: 72 MMHG | BODY MASS INDEX: 44.57 KG/M2 | SYSTOLIC BLOOD PRESSURE: 110 MMHG | HEART RATE: 102 BPM | WEIGHT: 284 LBS

## 2020-08-12 DIAGNOSIS — E66.01 CLASS 3 SEVERE OBESITY WITH BODY MASS INDEX (BMI) OF 40.0 TO 44.9 IN ADULT, UNSPECIFIED OBESITY TYPE, UNSPECIFIED WHETHER SERIOUS COMORBIDITY PRESENT: ICD-10-CM

## 2020-08-12 DIAGNOSIS — Z20.822 EXPOSURE TO COVID-19 VIRUS: ICD-10-CM

## 2020-08-12 DIAGNOSIS — J02.9 SORE THROAT: Primary | ICD-10-CM

## 2020-08-12 PROCEDURE — U0003 INFECTIOUS AGENT DETECTION BY NUCLEIC ACID (DNA OR RNA); SEVERE ACUTE RESPIRATORY SYNDROME CORONAVIRUS 2 (SARS-COV-2) (CORONAVIRUS DISEASE [COVID-19]), AMPLIFIED PROBE TECHNIQUE, MAKING USE OF HIGH THROUGHPUT TECHNOLOGIES AS DESCRIBED BY CMS-2020-01-R: HCPCS

## 2020-08-12 PROCEDURE — 99214 PR OFFICE/OUTPT VISIT, EST, LEVL IV, 30-39 MIN: ICD-10-PCS | Mod: S$GLB,,, | Performed by: PHYSICIAN ASSISTANT

## 2020-08-12 PROCEDURE — 99214 OFFICE O/P EST MOD 30 MIN: CPT | Mod: S$GLB,,, | Performed by: PHYSICIAN ASSISTANT

## 2020-08-12 NOTE — PATIENT INSTRUCTIONS
PLEASE READ YOUR DISCHARGE INSTRUCTIONS ENTIRELY AS IT CONTAINS IMPORTANT INFORMATION.    Patient had covid testing done today.  We will notify you of your results in the next 1-5 days. You can also receive the results on my chart. Quarantine at home until you receive results.  Patient understand that they cannot return to work until they are given their results and further recommendations.   If Negative: symptom free without fever reducing meds in 24 hours - can go back to work in 24 hours with surgical mask for 14 days.  If Positive: improved symptoms, no fever without fever reducing meds for 24 hours- have to be out for a minimum of 10 days passed from when symptoms first appeared with improvements in respiratory symptoms.    If patient is asymptomatic, patient will still need to be quarantine for at least 10 days from positive test results date.    Discussed corona virus precautions and reviewed CDC FAC; printed a copy for patient.  I discussed to continue to monitor their symptoms. Discussed that if their symptoms persist or worsen to seek re-evaluation. Clinic vs. ER precautions were given.  Patient verbalized understanding and agreed with the above plan of care.    - Rest.  Drink plenty of fluids.    - Tylenol as directed as needed for fever/pain.  For Tylenol, do not exceed 4000 mg/ day.       -Below are suggestions for symptomatic relief:              -Salt water gargles to soothe throat pain.              -Chloroseptic spray also helps to numb throat pain.              -Nasal saline spray reduces inflammation and dryness.              -Warm face compresses to help with facial sinus pain/pressure.              -Vicks vapor rub at night.              -Flonase OTC or Nasacort OTC for nasal congestion.              -Simple foods like chicken noodle soup.              -Mucinex for cough during the day time. Delsym helps with coughing at night. Mucinex-D if you have chest congestion or sputum (caution if  history of high blood pressure or palpitations).              -Zyrtec/Claritin during the day & Benadryl at night may help with allergies.  -If you DO NOT have Hypertension or any history of palpitations, it is ok to take over the counter Sudafed or Mucinex D or Allegra-D or Claritin-D or Zyrtec-D.  -If you do take one of the above, it is ok to combine that with plain over the counter Mucinex or Allegra or Claritin or Zyrtec. If, for example, you are taking Zyrtec -D, you can combine that with Mucinex, but not Mucinex-D.  If you are taking Mucinex-D, you can combine that with plain Allegra or Claritin or Zyrtec.   -If you DO have Hypertension or palpitations, it is safe to take Coricidin HBP for relief of sinus symptoms.      For your GI symptoms:  -Use gatorade/pedialyte or rehydration packets to help stay hydrated. Vitamin water and plain water do not contain rehydrating electrolytes.  -Increase clear liquids (water, gatorade, pedialyte, broths, jello, etc) Hold off on solids for 12-18 hours. Then advance to BRAT diet (banana, rice, applesauce, tea, toast/crackers), then advance further as tolerated. Avoid spicy or fatty foods.   -Use Peptobismol or Immodium to help alleviate your diarrhea symptoms.   -Avoid imodium unless you have more than 6 loose stools in 24 hours.   -Wash hands frequently while sick. Avoid ibuprofen or other NSAIDS until you are well.   -Please go to the ER if you experience worsening pain, blood in your vomit or stool, high fever, dizziness, fainting, swelling of your abdomen, inability to pass gas or stool.         - If you were prescribed antibiotics, please take them to completion. Please supplement with OTC probiotics and yogurt.   - If you are female and on birth control pills - please use additional methods of contraception to prevent pregnancy while on antibiotics and for one cycle after.       -You must understand that you've received an Urgent Care treatment only and that you may be  released before all your medical problems are known or treated. You, the patient, will arrange for follow up care as instructed. Please arrange follow up with your primary medical clinic within 2-5 days if your signs and symptoms have not resolved or worsen.   - Follow up with your PCP or specialty clinic as directed.  You can call [phone] or [phone] to schedule an appointment with the appropriate provider.    - If your condition worsens or fails to improve we recommend that you receive another evaluation at the emergency room immediately or contact your primary medical clinic to discuss your concerns.            Instructions for Patients Awaiting COVID-19 Test Results    You will either be called with your test result or it will be released to the patient portal.  If you have any questions about your test, please visit [email or web address] or call our COVID-19 information line at [phone].    Prevention steps for patients with confirmed or suspected COVID-19       Stay home and stay away from family members and friends. The CDC says, you can leave home after these three things have happened: 1) You have had no fever for at least 24 hours (that is one full day of no fever without the use of medicine that reduces fevers) 2) AND other symptoms have improved (for example, when your cough or shortness of breath have improved) 3) AND at least 10 days have passed since your symptoms first appeared.   Separate yourself from other people and animals in your home.   Call ahead before visiting your doctor.   Wear a facemask.   Cover your coughs and sneezes.   Wash your hands often with soap and water; hand  can be used, too.   Avoid sharing personal household items.   Wipe down surfaces used daily.   Monitor your symptoms. Seek prompt medical attention if your illness is worsening (e.g., difficulty breathing).    Before seeking care, call your healthcare provider.   If you have a  medical emergency and need to call 911, notify the dispatch personnel that you have, or are being evaluated for COVID-19. If possible, put on a facemask before emergency medical services arrive.        Recommended precautions for household members, intimate partners, and caregivers in a home setting of a patient with symptomatic laboratory-confirmed COVID-19 or a patient under investigation.  Household members, intimate partners, and caregivers in the home setting awaiting tests results have close contact with a person with symptomatic, laboratory-confirmed COVID-19 or a person under investigation. Close contacts should monitor their health; they should call their provider right away if they develop symptoms suggestive of COVID-19 (e.g., fever, cough, shortness of breath).    Close contacts should also follow these recommendations:   Make sure that you understand and can help the patient follow their provider's instructions for medication(s) and care. You should help the patient with basic needs in the home and provide support for getting groceries, prescriptions, and other personal needs.   Monitor the patient's symptoms. If the patient is getting sicker, call his or her healthcare provider and tell them that the patient has laboratory-confirmed COVID-19. If the patient has a medical emergency and you need to call 911, notify the dispatch personnel that the patient has, or is being evaluated for COVID-19.   Household members should stay in another room or be  from the patient. Household members should use a separate bedroom and bathroom, if available.   Prohibit visitors.   Household members should care for any pets in the home.   Make sure that shared spaces in the home have good air flow, such as by an air conditioner or an opened window, weather permitting.   Perform hand hygiene frequently. Wash your hands often with soap and water for at least 20 seconds or use an alcohol-based hand   (that contains > 60% alcohol) covering all surfaces of your hands and rubbing them together until they feel dry. Soap and water should be used preferentially.   Avoid touching your eyes, nose, and mouth.   The patient should wear a facemask. If the patient is not able to wear a facemask (for example, because it causes trouble breathing), caregivers should wear a mask when they are in the same room as the patient.   Wear a disposable facemask and gloves when you touch or have contact with the patient's blood, stool, or body fluids, such as saliva, sputum, nasal mucus, vomit, urine.  o Throw out disposable facemasks and gloves after using them. Do not reuse.  o When removing personal protective equipment, first remove and dispose of gloves. Then, immediately clean your hands with soap and water or alcohol-based hand . Next, remove and dispose of facemask, and immediately clean your hands again with soap and water or alcohol-based hand .   You should not share dishes, drinking glasses, cups, eating utensils, towels, bedding, or other items with the patient. After the patient uses these items, you should wash them thoroughly (see below Wash laundry thoroughly).   Clean all high-touch surfaces, such as counters, tabletops, doorknobs, bathroom fixtures, toilets, phones, keyboards, tablets, and bedside tables, every day. Also, clean any surfaces that may have blood, stool, or body fluids on them.   Use a household cleaning spray or wipe, according to the label instructions. Labels contain instructions for safe and effective use of the cleaning product including precautions you should take when applying the product, such as wearing gloves and making sure you have good ventilation during use of the product.   Wash laundry thoroughly.  o Immediately remove and wash clothes or bedding that have blood, stool, or body fluids on them.  o Wear disposable gloves while handling soiled items and keep  soiled items away from your body. Clean your hands (with soap and water or an alcohol-based hand ) immediately after removing your gloves.  o Read and follow directions on labels of laundry or clothing items and detergent. In general, using a normal laundry detergent according to washing machine instructions and dry thoroughly using the warmest temperatures recommended on the clothing label.   Place all used disposable gloves, facemasks, and other contaminated items in a lined container before disposing of them with other household waste. Clean your hands (with soap and water or an alcohol-based hand ) immediately after handling these items. Soap and water should be used preferentially if hands are visibly dirty.   Discuss any additional questions with your state or local health department or healthcare provider. Check available hours when contacting your local health department.    For more information see CDC link below.      https://www.cdc.gov/coronavirus/2019-ncov/hcp/guidance-prevent-spread.html#precautions        Sources:  [facility], [facility]          Instructions for Home Care of Patients and Caretakers with Coronavirus Disease 2019     Limit visitors to the home.  Older persons and those that have chronic medical conditions such as diabetes, lung and heart disease are at increased risk for illness.    If possible, patients should use a separate bedroom while recovering. Caregivers and household members should avoid prolonged contact with the patient which means to stay 6 feet away and avoid contact with cough droplets.  When close contact is necessary, wash your hands before and immediately after contact.    Perform hand hygiene frequently. Wash your hands often with soap and water for at least 20 seconds or use an alcohol-based hand , covering all surfaces of your hands and rubbing them together until they feel dry.    Avoid touching your eyes,  nose, and mouth with unwashed hands.   Avoid sharing household items with the patient. You should not share dishes, drinking glasses, cups, eating utensils, towels, bedding, or other items. After the patient uses these items, you should wash them thoroughly.   Wash laundry thoroughly.   o Immediately remove and wash clothes or bedding that have blood, stool, or body fluids on them.   Clean all high-touch surfaces, such as counters, tabletops, doorknobs, bathroom fixtures, toilets, phones, keyboards, tablets, and bedside tables, every day.   o Use a household cleaning spray or wipe, according to the label instructions. Labels contain instructions for safe and effective use of the cleaning product including precautions you should take when applying the product, such as wearing gloves and making sure you have good ventilation during use of the product.    For more information see CDC link below.      https://www.cdc.gov/coronavirus/2019-ncov/hcp/guidance-prevent-spread.html#precautions               If your symptoms worsen or if you have any other concerns, please contact [facility] at [phone].

## 2020-08-12 NOTE — PROGRESS NOTES
"Subjective:       Patient ID: Michelle Izaguirre is a 34 y.o. female.    Vitals:  height is 5' 7" (1.702 m) and weight is 128.8 kg (284 lb). Her temporal temperature is 98.6 °F (37 °C). Her blood pressure is 110/72 and her pulse is 102. Her respiration is 18 and oxygen saturation is 97%.     Chief Complaint: COVID-19 Concerns (sore throat)    33 yo presents to urgent care clinic for evaluation.  Patient is a teacher.  Concerned for COVID exposure from her boyfriend who has fever and other symptoms.  Today, she has mild headache and mild sore throat.  She has not taking anything for symptoms.  No other associated symptoms.  She would like to be tested for COVID-19.    Patient denies any paresthesias, nuchal rigidity, vision changes, hearing loss, focal weakness or deficits, or seizure activity.    Patient denies any recent URI symptoms, earache/drainage,  sinus/nasal congestion or rhinorrhea, loss of taste/smell, cough, weakness, fever, chills, sweats, body aches, palpitations, difficulty swallowing,  swollen glands, chest pain, shortness of breath, wheezing, leg swelling, dizziness, lightheadedness with position changes, dysuria, hematuria, rectal bleeding, bladder/bowel incontinence, flank pain, abdominal pain, nausea/vomiting, or diarrhea.             Constitution: Negative for activity change, appetite change, chills, sweating, fatigue, fever and generalized weakness.   HENT: Positive for sore throat. Negative for ear pain, hearing loss, facial swelling, congestion, postnasal drip, sinus pain, sinus pressure, trouble swallowing and voice change.    Neck: Negative for neck pain, neck stiffness and painful lymph nodes.   Cardiovascular: Negative for chest pain, leg swelling, palpitations, sob on exertion and passing out.   Eyes: Negative for eye discharge, eye pain, eye redness, photophobia, vision loss, double vision and blurred vision.   Respiratory: Negative for chest tightness, cough, sputum production, bloody " sputum, COPD, shortness of breath, stridor, wheezing and asthma.    Gastrointestinal: Negative for abdominal pain, nausea, vomiting, constipation, diarrhea, bright red blood in stool, rectal bleeding, heartburn and bowel incontinence.   Genitourinary: Negative for dysuria, frequency, urgency, urine decreased, flank pain, bladder incontinence and hematuria.   Musculoskeletal: Negative for trauma, joint pain, joint swelling, abnormal ROM of joint, muscle cramps and muscle ache.   Skin: Negative for color change, pale, rash and wound.   Allergic/Immunologic: Negative for seasonal allergies, asthma and immunocompromised state.   Neurological: Positive for headaches. Negative for dizziness, history of vertigo, light-headedness, passing out, facial drooping, speech difficulty, coordination disturbances, loss of balance, disorientation, altered mental status, loss of consciousness, numbness, tingling and seizures.   Hematologic/Lymphatic: Negative for swollen lymph nodes, easy bruising/bleeding and trouble clotting. Does not bruise/bleed easily.   Psychiatric/Behavioral: Negative for altered mental status and disorientation.       Objective:      Physical Exam   Constitutional: She is oriented to person, place, and time. She appears well-developed. She is cooperative.  Non-toxic appearance. She does not appear ill. No distress. obesity  HENT:   Head: Normocephalic and atraumatic.   Ears:   Right Ear: Hearing, external ear and ear canal normal. No drainage, swelling or tenderness.   Left Ear: Hearing, external ear and ear canal normal. No drainage, swelling or tenderness.   Nose: Nose normal. No rhinorrhea, purulent discharge or congestion. Right sinus exhibits no maxillary sinus tenderness and no frontal sinus tenderness. Left sinus exhibits no maxillary sinus tenderness and no frontal sinus tenderness.   Mouth/Throat: Uvula is midline, oropharynx is clear and moist and mucous membranes are normal. Mucous membranes are  moist. No oral lesions. No trismus in the jaw. No uvula swelling. No oropharyngeal exudate, posterior oropharyngeal edema, posterior oropharyngeal erythema, tonsillar abscesses or cobblestoning. Tonsils are 1+ on the right. Tonsils are 1+ on the left. No tonsillar exudate. Oropharynx is clear.   Eyes: Pupils are equal, round, and reactive to light. Conjunctivae, EOM and lids are normal. Right eye exhibits no discharge. Left eye exhibits no discharge. No visual field deficit is present. Right conjunctiva is not injected. Right conjunctiva has no hemorrhage. Left conjunctiva is not injected. Left conjunctiva has no hemorrhage. extraocular movement intactvision grossly intactgaze aligned appropriately  Neck: Normal range of motion and full passive range of motion without pain. Neck supple. No neck rigidity.   Cardiovascular: Normal rate, regular rhythm and normal pulses.   Pulmonary/Chest: Effort normal. No accessory muscle usage or stridor. No respiratory distress. She has no wheezes. She exhibits no tenderness.   Abdominal: Soft. Normal appearance. She exhibits no distension and no mass. There is no splenomegaly or hepatomegaly. There is no abdominal tenderness. There is no rebound, no guarding, no tenderness at McBurney's point, negative Hallman's sign, no left CVA tenderness, negative Rovsing's sign and no right CVA tenderness.   Musculoskeletal: Normal range of motion.      Right lower leg: No edema.      Left lower leg: No edema.      Comments: 5/5 strength and ROM in all extremities.  Gait normal.   Lymphadenopathy:     She has no cervical adenopathy.   Neurological: She is alert and oriented to person, place, and time. She has normal motor skills, normal sensation and intact cranial nerves. She displays no weakness, facial symmetry and normal reflexes. No cranial nerve deficit or sensory deficit. She exhibits normal muscle tone. She has a normal Finger-Nose-Finger Test. She shows no pronator drift. She displays  no seizure activity. Gait and coordination normal. Coordination normal. GCS eye subscore is 4. GCS verbal subscore is 5. GCS motor subscore is 6.   Skin: Skin is warm, dry, not diaphoretic and no rash. Capillary refill takes less than 2 seconds. Psychiatric: Her behavior is normal. Mood and thought content normal.   Nursing note and vitals reviewed.        Assessment:       1. Sore throat    2. Exposure to Covid-19 Virus    3. Class 3 severe obesity with body mass index (BMI) of 40.0 to 44.9 in adult, unspecified obesity type, unspecified whether serious comorbidity present        Nontoxic appearing. Vitals are stable. Patient presents for COVID nasal swab testing. Patient is concerned for possible exposure. Patient has symptoms at this time which is consistent with viral syndrome.  All diagnostic testing personally reviewed and interpreted.       Patient was recommended OTC treatments for their symptoms. Patient was counseled, explained with the test results meaning, and answered all of questions.  Patient will be notified of results in the next 3 days.  They can also receive results via my chart.  Printed and verbal COVID guidelines were given. Patient understands that they received an Urgent Care treatment only and that they may be released before all your medical problems are known or treated. Strict ED versus clinic precautions given.  Patient verbalized understanding and agreed with plan of care.    -We also discussed diet, exercise, and weight loss given obesity.    Plan:         Sore throat    Exposure to Covid-19 Virus  -     COVID-19 Routine Screening    Class 3 severe obesity with body mass index (BMI) of 40.0 to 44.9 in adult, unspecified obesity type, unspecified whether serious comorbidity present           Patient Instructions     PLEASE READ YOUR DISCHARGE INSTRUCTIONS ENTIRELY AS IT CONTAINS IMPORTANT INFORMATION.    Patient had covid testing done today.  We will notify you of your results in the  next 1-5 days. You can also receive the results on my chart. Quarantine at home until you receive results.  Patient understand that they cannot return to work until they are given their results and further recommendations.   If Negative: symptom free without fever reducing meds in 24 hours - can go back to work in 24 hours with surgical mask for 14 days.  If Positive: improved symptoms, no fever without fever reducing meds for 24 hours- have to be out for a minimum of 10 days passed from when symptoms first appeared with improvements in respiratory symptoms.    If patient is asymptomatic, patient will still need to be quarantine for at least 10 days from positive test results date.    Discussed corona virus precautions and reviewed CDC FAC; printed a copy for patient.  I discussed to continue to monitor their symptoms. Discussed that if their symptoms persist or worsen to seek re-evaluation. Clinic vs. ER precautions were given.  Patient verbalized understanding and agreed with the above plan of care.    - Rest.  Drink plenty of fluids.    - Tylenol as directed as needed for fever/pain.  For Tylenol, do not exceed 4000 mg/ day.       -Below are suggestions for symptomatic relief:              -Salt water gargles to soothe throat pain.              -Chloroseptic spray also helps to numb throat pain.              -Nasal saline spray reduces inflammation and dryness.              -Warm face compresses to help with facial sinus pain/pressure.              -Vicks vapor rub at night.              -Flonase OTC or Nasacort OTC for nasal congestion.              -Simple foods like chicken noodle soup.              -Mucinex for cough during the day time. Delsym helps with coughing at night. Mucinex-D if you have chest congestion or sputum (caution if history of high blood pressure or palpitations).              -Zyrtec/Claritin during the day & Benadryl at night may help with allergies.  -If you DO NOT have Hypertension or  any history of palpitations, it is ok to take over the counter Sudafed or Mucinex D or Allegra-D or Claritin-D or Zyrtec-D.  -If you do take one of the above, it is ok to combine that with plain over the counter Mucinex or Allegra or Claritin or Zyrtec. If, for example, you are taking Zyrtec -D, you can combine that with Mucinex, but not Mucinex-D.  If you are taking Mucinex-D, you can combine that with plain Allegra or Claritin or Zyrtec.   -If you DO have Hypertension or palpitations, it is safe to take Coricidin HBP for relief of sinus symptoms.      For your GI symptoms:  -Use gatorade/pedialyte or rehydration packets to help stay hydrated. Vitamin water and plain water do not contain rehydrating electrolytes.  -Increase clear liquids (water, gatorade, pedialyte, broths, jello, etc) Hold off on solids for 12-18 hours. Then advance to BRAT diet (banana, rice, applesauce, tea, toast/crackers), then advance further as tolerated. Avoid spicy or fatty foods.   -Use Peptobismol or Immodium to help alleviate your diarrhea symptoms.   -Avoid imodium unless you have more than 6 loose stools in 24 hours.   -Wash hands frequently while sick. Avoid ibuprofen or other NSAIDS until you are well.   -Please go to the ER if you experience worsening pain, blood in your vomit or stool, high fever, dizziness, fainting, swelling of your abdomen, inability to pass gas or stool.         - If you were prescribed antibiotics, please take them to completion. Please supplement with OTC probiotics and yogurt.   - If you are female and on birth control pills - please use additional methods of contraception to prevent pregnancy while on antibiotics and for one cycle after.       -You must understand that you've received an Urgent Care treatment only and that you may be released before all your medical problems are known or treated. You, the patient, will arrange for follow up care as instructed. Please arrange follow up with your primary  medical clinic within 2-5 days if your signs and symptoms have not resolved or worsen.   - Follow up with your PCP or specialty clinic as directed.  You can call [phone] or [phone] to schedule an appointment with the appropriate provider.    - If your condition worsens or fails to improve we recommend that you receive another evaluation at the emergency room immediately or contact your primary medical clinic to discuss your concerns.            Instructions for Patients Awaiting COVID-19 Test Results    You will either be called with your test result or it will be released to the patient portal.  If you have any questions about your test, please visit [email or web address] or call our COVID-19 information line at [phone].    Prevention steps for patients with confirmed or suspected COVID-19       Stay home and stay away from family members and friends. The CDC says, you can leave home after these three things have happened: 1) You have had no fever for at least 24 hours (that is one full day of no fever without the use of medicine that reduces fevers) 2) AND other symptoms have improved (for example, when your cough or shortness of breath have improved) 3) AND at least 10 days have passed since your symptoms first appeared.   Separate yourself from other people and animals in your home.   Call ahead before visiting your doctor.   Wear a facemask.   Cover your coughs and sneezes.   Wash your hands often with soap and water; hand  can be used, too.   Avoid sharing personal household items.   Wipe down surfaces used daily.   Monitor your symptoms. Seek prompt medical attention if your illness is worsening (e.g., difficulty breathing).    Before seeking care, call your healthcare provider.   If you have a medical emergency and need to call 911, notify the dispatch personnel that you have, or are being evaluated for COVID-19. If possible, put on a facemask before emergency  medical services arrive.        Recommended precautions for household members, intimate partners, and caregivers in a home setting of a patient with symptomatic laboratory-confirmed COVID-19 or a patient under investigation.  Household members, intimate partners, and caregivers in the home setting awaiting tests results have close contact with a person with symptomatic, laboratory-confirmed COVID-19 or a person under investigation. Close contacts should monitor their health; they should call their provider right away if they develop symptoms suggestive of COVID-19 (e.g., fever, cough, shortness of breath).    Close contacts should also follow these recommendations:   Make sure that you understand and can help the patient follow their provider's instructions for medication(s) and care. You should help the patient with basic needs in the home and provide support for getting groceries, prescriptions, and other personal needs.   Monitor the patient's symptoms. If the patient is getting sicker, call his or her healthcare provider and tell them that the patient has laboratory-confirmed COVID-19. If the patient has a medical emergency and you need to call 911, notify the dispatch personnel that the patient has, or is being evaluated for COVID-19.   Household members should stay in another room or be  from the patient. Household members should use a separate bedroom and bathroom, if available.   Prohibit visitors.   Household members should care for any pets in the home.   Make sure that shared spaces in the home have good air flow, such as by an air conditioner or an opened window, weather permitting.   Perform hand hygiene frequently. Wash your hands often with soap and water for at least 20 seconds or use an alcohol-based hand  (that contains > 60% alcohol) covering all surfaces of your hands and rubbing them together until they feel dry. Soap and water should be used preferentially.   Avoid  touching your eyes, nose, and mouth.   The patient should wear a facemask. If the patient is not able to wear a facemask (for example, because it causes trouble breathing), caregivers should wear a mask when they are in the same room as the patient.   Wear a disposable facemask and gloves when you touch or have contact with the patient's blood, stool, or body fluids, such as saliva, sputum, nasal mucus, vomit, urine.  o Throw out disposable facemasks and gloves after using them. Do not reuse.  o When removing personal protective equipment, first remove and dispose of gloves. Then, immediately clean your hands with soap and water or alcohol-based hand . Next, remove and dispose of facemask, and immediately clean your hands again with soap and water or alcohol-based hand .   You should not share dishes, drinking glasses, cups, eating utensils, towels, bedding, or other items with the patient. After the patient uses these items, you should wash them thoroughly (see below Wash laundry thoroughly).   Clean all high-touch surfaces, such as counters, tabletops, doorknobs, bathroom fixtures, toilets, phones, keyboards, tablets, and bedside tables, every day. Also, clean any surfaces that may have blood, stool, or body fluids on them.   Use a household cleaning spray or wipe, according to the label instructions. Labels contain instructions for safe and effective use of the cleaning product including precautions you should take when applying the product, such as wearing gloves and making sure you have good ventilation during use of the product.   Wash laundry thoroughly.  o Immediately remove and wash clothes or bedding that have blood, stool, or body fluids on them.  o Wear disposable gloves while handling soiled items and keep soiled items away from your body. Clean your hands (with soap and water or an alcohol-based hand ) immediately after removing your gloves.  o Read and follow  directions on labels of laundry or clothing items and detergent. In general, using a normal laundry detergent according to washing machine instructions and dry thoroughly using the warmest temperatures recommended on the clothing label.   Place all used disposable gloves, facemasks, and other contaminated items in a lined container before disposing of them with other household waste. Clean your hands (with soap and water or an alcohol-based hand ) immediately after handling these items. Soap and water should be used preferentially if hands are visibly dirty.   Discuss any additional questions with your state or local health department or healthcare provider. Check available hours when contacting your local health department.    For more information see CDC link below.      https://www.cdc.gov/coronavirus/2019-ncov/hcp/guidance-prevent-spread.html#precautions        Sources:  [facility], [place] [facility]          Instructions for Home Care of Patients and Caretakers with Coronavirus Disease 2019     Limit visitors to the home.  Older persons and those that have chronic medical conditions such as diabetes, lung and heart disease are at increased risk for illness.    If possible, patients should use a separate bedroom while recovering. Caregivers and household members should avoid prolonged contact with the patient which means to stay 6 feet away and avoid contact with cough droplets.  When close contact is necessary, wash your hands before and immediately after contact.    Perform hand hygiene frequently. Wash your hands often with soap and water for at least 20 seconds or use an alcohol-based hand , covering all surfaces of your hands and rubbing them together until they feel dry.    Avoid touching your eyes, nose, and mouth with unwashed hands.   Avoid sharing household items with the patient. You should not share dishes, drinking glasses, cups, eating utensils, towels,  bedding, or other items. After the patient uses these items, you should wash them thoroughly.   Wash laundry thoroughly.   o Immediately remove and wash clothes or bedding that have blood, stool, or body fluids on them.   Clean all high-touch surfaces, such as counters, tabletops, doorknobs, bathroom fixtures, toilets, phones, keyboards, tablets, and bedside tables, every day.   o Use a household cleaning spray or wipe, according to the label instructions. Labels contain instructions for safe and effective use of the cleaning product including precautions you should take when applying the product, such as wearing gloves and making sure you have good ventilation during use of the product.    For more information see CDC link below.      https://www.cdc.gov/coronavirus/2019-ncov/hcp/guidance-prevent-spread.html#precautions               If your symptoms worsen or if you have any other concerns, please contact [facility] at [phone].

## 2020-08-13 LAB — SARS-COV-2 RNA RESP QL NAA+PROBE: NOT DETECTED

## 2020-08-19 ENCOUNTER — HOSPITAL ENCOUNTER (OUTPATIENT)
Dept: RADIOLOGY | Facility: HOSPITAL | Age: 35
Discharge: HOME | End: 2020-08-19
Attending: OBSTETRICS & GYNECOLOGY
Payer: COMMERCIAL

## 2020-08-19 DIAGNOSIS — N92.0 MENORRHAGIA WITH REGULAR CYCLE: ICD-10-CM

## 2020-08-19 PROCEDURE — 76830 TRANSVAGINAL US NON-OB: CPT | Mod: TC

## 2020-08-19 PROCEDURE — 76830 US PELVIS COMP WITH TRANSVAG NON-OB (XPD): ICD-10-PCS | Mod: 26,,, | Performed by: RADIOLOGY

## 2020-08-19 PROCEDURE — 76830 TRANSVAGINAL US NON-OB: CPT | Mod: 26,,, | Performed by: RADIOLOGY

## 2020-08-19 PROCEDURE — 76856 US PELVIS COMP WITH TRANSVAG NON-OB (XPD): ICD-10-PCS | Mod: 26,,, | Performed by: RADIOLOGY

## 2020-08-19 PROCEDURE — 76856 US EXAM PELVIC COMPLETE: CPT | Mod: 26,,, | Performed by: RADIOLOGY

## 2020-08-21 ENCOUNTER — TELEPHONE (OUTPATIENT)
Dept: OBSTETRICS AND GYNECOLOGY | Facility: CLINIC | Age: 35
End: 2020-08-21

## 2020-08-21 DIAGNOSIS — N83.201 RIGHT OVARIAN CYST: Primary | ICD-10-CM

## 2020-08-21 NOTE — TELEPHONE ENCOUNTER
----- Message from Rama Plummer MD sent at 8/21/2020 10:51 AM CDT -----  Right ovarian cyst. Order for repeat U/S placed for 8-12 weeks

## 2020-08-31 ENCOUNTER — CLINICAL SUPPORT (OUTPATIENT)
Dept: URGENT CARE | Facility: CLINIC | Age: 35
End: 2020-08-31
Payer: COMMERCIAL

## 2020-08-31 VITALS — OXYGEN SATURATION: 97 % | TEMPERATURE: 97 F | HEART RATE: 75 BPM

## 2020-08-31 DIAGNOSIS — R09.89 SINUS COMPLAINT: Primary | ICD-10-CM

## 2020-08-31 LAB
CTP QC/QA: YES
SARS-COV-2 RDRP RESP QL NAA+PROBE: NEGATIVE

## 2020-08-31 PROCEDURE — U0002: ICD-10-PCS | Mod: QW,S$GLB,, | Performed by: NURSE PRACTITIONER

## 2020-08-31 PROCEDURE — U0002 COVID-19 LAB TEST NON-CDC: HCPCS | Mod: QW,S$GLB,, | Performed by: NURSE PRACTITIONER

## 2020-08-31 NOTE — PROGRESS NOTES
Patient is here per [facility] for a Rapid Covid-19 screening. I have recommended that this patient have a provider's visit but she declines at this time. I have discussed the risks and benefits of this test with her. The patient verbalizes understanding.

## 2020-09-03 ENCOUNTER — TELEPHONE (OUTPATIENT)
Dept: URGENT CARE | Facility: CLINIC | Age: 35
End: 2020-09-03

## 2020-10-07 ENCOUNTER — PROCEDURE VISIT (OUTPATIENT)
Dept: OBSTETRICS AND GYNECOLOGY | Facility: CLINIC | Age: 35
End: 2020-10-07
Payer: COMMERCIAL

## 2020-10-07 DIAGNOSIS — N83.201 RIGHT OVARIAN CYST: ICD-10-CM

## 2020-10-07 PROCEDURE — 76830 PR  ECHOGRAPHY,TRANSVAGINAL: ICD-10-PCS | Mod: S$GLB,,, | Performed by: OBSTETRICS & GYNECOLOGY

## 2020-10-07 PROCEDURE — 76830 TRANSVAGINAL US NON-OB: CPT | Mod: S$GLB,,, | Performed by: OBSTETRICS & GYNECOLOGY

## 2020-10-07 PROCEDURE — 76856 US EXAM PELVIC COMPLETE: CPT | Mod: S$GLB,,, | Performed by: OBSTETRICS & GYNECOLOGY

## 2020-10-07 PROCEDURE — 76856 PR  ECHO,PELVIC (NONOBSTETRIC): ICD-10-PCS | Mod: S$GLB,,, | Performed by: OBSTETRICS & GYNECOLOGY

## 2020-10-09 ENCOUNTER — PATIENT MESSAGE (OUTPATIENT)
Dept: OBSTETRICS AND GYNECOLOGY | Facility: CLINIC | Age: 35
End: 2020-10-09

## 2020-10-14 ENCOUNTER — PATIENT MESSAGE (OUTPATIENT)
Dept: OBSTETRICS AND GYNECOLOGY | Facility: CLINIC | Age: 35
End: 2020-10-14

## 2020-10-14 ENCOUNTER — TELEPHONE (OUTPATIENT)
Dept: OBSTETRICS AND GYNECOLOGY | Facility: CLINIC | Age: 35
End: 2020-10-14

## 2020-10-14 NOTE — TELEPHONE ENCOUNTER
Called to discuss U/S results and options for management. Message also sent in [employer]. No answer. Left message    Rama Plummer MD, FACOG  OB/GYN

## 2020-12-07 ENCOUNTER — PATIENT MESSAGE (OUTPATIENT)
Dept: OBSTETRICS AND GYNECOLOGY | Facility: CLINIC | Age: 35
End: 2020-12-07

## 2020-12-22 ENCOUNTER — PATIENT OUTREACH (OUTPATIENT)
Dept: ADMINISTRATIVE | Facility: OTHER | Age: 35
End: 2020-12-22

## 2021-01-07 ENCOUNTER — PATIENT MESSAGE (OUTPATIENT)
Dept: OBSTETRICS AND GYNECOLOGY | Facility: CLINIC | Age: 36
End: 2021-01-07

## 2021-01-08 ENCOUNTER — OFFICE VISIT (OUTPATIENT)
Dept: URGENT CARE | Facility: CLINIC | Age: 36
End: 2021-01-08
Payer: COMMERCIAL

## 2021-01-08 VITALS
DIASTOLIC BLOOD PRESSURE: 83 MMHG | HEART RATE: 81 BPM | RESPIRATION RATE: 12 BRPM | WEIGHT: 284 LBS | BODY MASS INDEX: 44.57 KG/M2 | HEIGHT: 67 IN | SYSTOLIC BLOOD PRESSURE: 137 MMHG | OXYGEN SATURATION: 98 % | TEMPERATURE: 97 F

## 2021-01-08 DIAGNOSIS — J02.9 SORE THROAT: Primary | ICD-10-CM

## 2021-01-08 DIAGNOSIS — Z11.52 ENCOUNTER FOR SCREENING FOR COVID-19: ICD-10-CM

## 2021-01-08 LAB
CTP QC/QA: YES
SARS-COV-2 RDRP RESP QL NAA+PROBE: NEGATIVE

## 2021-01-08 PROCEDURE — 99213 PR OFFICE/OUTPT VISIT, EST, LEVL III, 20-29 MIN: ICD-10-PCS | Mod: S$GLB,CS,ICN, | Performed by: PHYSICIAN ASSISTANT

## 2021-01-08 PROCEDURE — 1125F AMNT PAIN NOTED PAIN PRSNT: CPT | Mod: S$GLB,ICN,, | Performed by: PHYSICIAN ASSISTANT

## 2021-01-08 PROCEDURE — 3008F PR BODY MASS INDEX (BMI) DOCUMENTED: ICD-10-PCS | Mod: CPTII,S$GLB,ICN, | Performed by: PHYSICIAN ASSISTANT

## 2021-01-08 PROCEDURE — U0002 COVID-19 LAB TEST NON-CDC: HCPCS | Mod: QW,S$GLB,ICN, | Performed by: PHYSICIAN ASSISTANT

## 2021-01-08 PROCEDURE — U0002: ICD-10-PCS | Mod: QW,S$GLB,ICN, | Performed by: PHYSICIAN ASSISTANT

## 2021-01-08 PROCEDURE — 99213 OFFICE O/P EST LOW 20 MIN: CPT | Mod: S$GLB,CS,ICN, | Performed by: PHYSICIAN ASSISTANT

## 2021-01-08 PROCEDURE — 1125F PR PAIN SEVERITY QUANTIFIED, PAIN PRESENT: ICD-10-PCS | Mod: S$GLB,ICN,, | Performed by: PHYSICIAN ASSISTANT

## 2021-01-08 PROCEDURE — 3008F BODY MASS INDEX DOCD: CPT | Mod: CPTII,S$GLB,ICN, | Performed by: PHYSICIAN ASSISTANT

## 2021-01-20 ENCOUNTER — OFFICE VISIT (OUTPATIENT)
Dept: OBSTETRICS AND GYNECOLOGY | Facility: CLINIC | Age: 36
End: 2021-01-20
Payer: COMMERCIAL

## 2021-01-20 DIAGNOSIS — N83.201 RIGHT OVARIAN CYST: Primary | ICD-10-CM

## 2021-01-20 PROCEDURE — 99212 OFFICE O/P EST SF 10 MIN: CPT | Mod: 95,,, | Performed by: OBSTETRICS & GYNECOLOGY

## 2021-01-20 PROCEDURE — 99212 PR OFFICE/OUTPT VISIT, EST, LEVL II, 10-19 MIN: ICD-10-PCS | Mod: 95,,, | Performed by: OBSTETRICS & GYNECOLOGY

## 2021-02-27 ENCOUNTER — IMMUNIZATION (OUTPATIENT)
Dept: PHARMACY | Facility: CLINIC | Age: 36
End: 2021-02-27
Payer: COMMERCIAL

## 2021-02-27 DIAGNOSIS — Z23 NEED FOR VACCINATION: Primary | ICD-10-CM

## 2021-03-27 ENCOUNTER — IMMUNIZATION (OUTPATIENT)
Dept: PHARMACY | Facility: CLINIC | Age: 36
End: 2021-03-27
Payer: COMMERCIAL

## 2021-03-27 DIAGNOSIS — Z23 NEED FOR VACCINATION: Primary | ICD-10-CM

## 2021-08-05 ENCOUNTER — CLINICAL SUPPORT (OUTPATIENT)
Dept: OTHER | Facility: CLINIC | Age: 36
End: 2021-08-05
Payer: COMMERCIAL

## 2021-08-05 DIAGNOSIS — Z00.8 ENCOUNTER FOR OTHER GENERAL EXAMINATION: ICD-10-CM

## 2021-08-06 VITALS — HEIGHT: 67 IN | BODY MASS INDEX: 44.48 KG/M2

## 2021-08-06 LAB
GLUCOSE SERPL-MCNC: 85 MG/DL (ref 60–140)
HDLC SERPL-MCNC: 43 MG/DL
POC CHOLESTEROL, LDL (DOCK): 111 MG/DL
POC CHOLESTEROL, TOTAL: 183 MG/DL
TRIGL SERPL-MCNC: 146 MG/DL

## 2021-08-18 ENCOUNTER — TELEPHONE (OUTPATIENT)
Dept: ALLERGY | Facility: CLINIC | Age: 36
End: 2021-08-18

## 2021-08-19 ENCOUNTER — PATIENT MESSAGE (OUTPATIENT)
Dept: ALLERGY | Facility: CLINIC | Age: 36
End: 2021-08-19

## 2021-10-05 ENCOUNTER — PATIENT MESSAGE (OUTPATIENT)
Dept: ADMINISTRATIVE | Facility: HOSPITAL | Age: 36
End: 2021-10-05

## 2021-10-26 ENCOUNTER — PATIENT OUTREACH (OUTPATIENT)
Dept: ADMINISTRATIVE | Facility: OTHER | Age: 36
End: 2021-10-26
Payer: COMMERCIAL

## 2021-10-28 ENCOUNTER — OFFICE VISIT (OUTPATIENT)
Dept: OBSTETRICS AND GYNECOLOGY | Facility: CLINIC | Age: 36
End: 2021-10-28
Payer: COMMERCIAL

## 2021-10-28 VITALS
BODY MASS INDEX: 45.85 KG/M2 | WEIGHT: 292.75 LBS | DIASTOLIC BLOOD PRESSURE: 72 MMHG | SYSTOLIC BLOOD PRESSURE: 126 MMHG

## 2021-10-28 DIAGNOSIS — Z00.00 WELL WOMAN EXAM WITHOUT GYNECOLOGICAL EXAM: Primary | ICD-10-CM

## 2021-10-28 DIAGNOSIS — N83.209 CYST OF OVARY, UNSPECIFIED LATERALITY: ICD-10-CM

## 2021-10-28 PROCEDURE — 4010F PR ACE/ARB THEARPY RXD/TAKEN: ICD-10-PCS | Mod: CPTII,S$GLB,, | Performed by: OBSTETRICS & GYNECOLOGY

## 2021-10-28 PROCEDURE — 3078F PR MOST RECENT DIASTOLIC BLOOD PRESSURE < 80 MM HG: ICD-10-PCS | Mod: CPTII,S$GLB,, | Performed by: OBSTETRICS & GYNECOLOGY

## 2021-10-28 PROCEDURE — 1159F PR MEDICATION LIST DOCUMENTED IN MEDICAL RECORD: ICD-10-PCS | Mod: CPTII,S$GLB,, | Performed by: OBSTETRICS & GYNECOLOGY

## 2021-10-28 PROCEDURE — 1159F MED LIST DOCD IN RCRD: CPT | Mod: CPTII,S$GLB,, | Performed by: OBSTETRICS & GYNECOLOGY

## 2021-10-28 PROCEDURE — 3008F BODY MASS INDEX DOCD: CPT | Mod: CPTII,S$GLB,, | Performed by: OBSTETRICS & GYNECOLOGY

## 2021-10-28 PROCEDURE — 1160F RVW MEDS BY RX/DR IN RCRD: CPT | Mod: CPTII,S$GLB,, | Performed by: OBSTETRICS & GYNECOLOGY

## 2021-10-28 PROCEDURE — 3008F PR BODY MASS INDEX (BMI) DOCUMENTED: ICD-10-PCS | Mod: CPTII,S$GLB,, | Performed by: OBSTETRICS & GYNECOLOGY

## 2021-10-28 PROCEDURE — 1160F PR REVIEW ALL MEDS BY PRESCRIBER/CLIN PHARMACIST DOCUMENTED: ICD-10-PCS | Mod: CPTII,S$GLB,, | Performed by: OBSTETRICS & GYNECOLOGY

## 2021-10-28 PROCEDURE — 4010F ACE/ARB THERAPY RXD/TAKEN: CPT | Mod: CPTII,S$GLB,, | Performed by: OBSTETRICS & GYNECOLOGY

## 2021-10-28 PROCEDURE — 3074F PR MOST RECENT SYSTOLIC BLOOD PRESSURE < 130 MM HG: ICD-10-PCS | Mod: CPTII,S$GLB,, | Performed by: OBSTETRICS & GYNECOLOGY

## 2021-10-28 PROCEDURE — 99395 PREV VISIT EST AGE 18-39: CPT | Mod: S$GLB,,, | Performed by: OBSTETRICS & GYNECOLOGY

## 2021-10-28 PROCEDURE — 3078F DIAST BP <80 MM HG: CPT | Mod: CPTII,S$GLB,, | Performed by: OBSTETRICS & GYNECOLOGY

## 2021-10-28 PROCEDURE — 3074F SYST BP LT 130 MM HG: CPT | Mod: CPTII,S$GLB,, | Performed by: OBSTETRICS & GYNECOLOGY

## 2021-10-28 PROCEDURE — 99999 PR PBB SHADOW E&M-EST. PATIENT-LVL III: CPT | Mod: PBBFAC,,, | Performed by: OBSTETRICS & GYNECOLOGY

## 2021-10-28 PROCEDURE — 99395 PR PREVENTIVE VISIT,EST,18-39: ICD-10-PCS | Mod: S$GLB,,, | Performed by: OBSTETRICS & GYNECOLOGY

## 2021-10-28 PROCEDURE — 99999 PR PBB SHADOW E&M-EST. PATIENT-LVL III: ICD-10-PCS | Mod: PBBFAC,,, | Performed by: OBSTETRICS & GYNECOLOGY

## 2021-11-01 ENCOUNTER — TELEPHONE (OUTPATIENT)
Dept: OBSTETRICS AND GYNECOLOGY | Facility: CLINIC | Age: 36
End: 2021-11-01
Payer: COMMERCIAL

## 2021-11-04 ENCOUNTER — PATIENT MESSAGE (OUTPATIENT)
Dept: DERMATOLOGY | Facility: CLINIC | Age: 36
End: 2021-11-04

## 2021-11-05 ENCOUNTER — PATIENT MESSAGE (OUTPATIENT)
Dept: OBSTETRICS AND GYNECOLOGY | Facility: CLINIC | Age: 36
End: 2021-11-05
Payer: COMMERCIAL

## 2021-11-10 ENCOUNTER — OFFICE VISIT (OUTPATIENT)
Dept: OBSTETRICS AND GYNECOLOGY | Facility: CLINIC | Age: 36
End: 2021-11-10
Payer: COMMERCIAL

## 2021-11-10 DIAGNOSIS — N83.201 BILATERAL OVARIAN CYSTS: Primary | ICD-10-CM

## 2021-11-10 DIAGNOSIS — N83.202 BILATERAL OVARIAN CYSTS: Primary | ICD-10-CM

## 2021-11-10 DIAGNOSIS — Z31.41 FERTILITY TESTING: ICD-10-CM

## 2021-11-10 PROCEDURE — 99213 OFFICE O/P EST LOW 20 MIN: CPT | Mod: 95,,, | Performed by: OBSTETRICS & GYNECOLOGY

## 2021-11-10 PROCEDURE — 99213 PR OFFICE/OUTPT VISIT, EST, LEVL III, 20-29 MIN: ICD-10-PCS | Mod: 95,,, | Performed by: OBSTETRICS & GYNECOLOGY

## 2021-11-10 PROCEDURE — 4010F PR ACE/ARB THEARPY RXD/TAKEN: ICD-10-PCS | Mod: CPTII,95,, | Performed by: OBSTETRICS & GYNECOLOGY

## 2021-11-10 PROCEDURE — 4010F ACE/ARB THERAPY RXD/TAKEN: CPT | Mod: CPTII,95,, | Performed by: OBSTETRICS & GYNECOLOGY

## 2021-12-13 ENCOUNTER — PATIENT OUTREACH (OUTPATIENT)
Dept: ADMINISTRATIVE | Facility: HOSPITAL | Age: 36
End: 2021-12-13
Payer: COMMERCIAL

## 2021-12-28 ENCOUNTER — OFFICE VISIT (OUTPATIENT)
Dept: FAMILY MEDICINE | Facility: CLINIC | Age: 36
End: 2021-12-28
Payer: COMMERCIAL

## 2021-12-28 VITALS
HEART RATE: 82 BPM | WEIGHT: 293 LBS | TEMPERATURE: 98 F | HEIGHT: 67 IN | DIASTOLIC BLOOD PRESSURE: 82 MMHG | BODY MASS INDEX: 45.99 KG/M2 | SYSTOLIC BLOOD PRESSURE: 124 MMHG | OXYGEN SATURATION: 98 % | RESPIRATION RATE: 18 BRPM

## 2021-12-28 DIAGNOSIS — J45.20 MILD INTERMITTENT ASTHMA WITHOUT COMPLICATION: ICD-10-CM

## 2021-12-28 DIAGNOSIS — Z00.00 ANNUAL PHYSICAL EXAM: Primary | ICD-10-CM

## 2021-12-28 DIAGNOSIS — I10 BENIGN HYPERTENSION: ICD-10-CM

## 2021-12-28 DIAGNOSIS — Z23 NEED FOR INFLUENZA VACCINATION: ICD-10-CM

## 2021-12-28 DIAGNOSIS — F33.0 DEPRESSION, MAJOR, RECURRENT, MILD: ICD-10-CM

## 2021-12-28 DIAGNOSIS — L30.9 DERMATITIS: ICD-10-CM

## 2021-12-28 PROBLEM — F33.2 SEVERE EPISODE OF RECURRENT MAJOR DEPRESSIVE DISORDER, WITHOUT PSYCHOTIC FEATURES: Status: RESOLVED | Noted: 2020-01-23 | Resolved: 2021-12-28

## 2021-12-28 PROCEDURE — 3079F DIAST BP 80-89 MM HG: CPT | Mod: CPTII,S$GLB,, | Performed by: INTERNAL MEDICINE

## 2021-12-28 PROCEDURE — 3074F SYST BP LT 130 MM HG: CPT | Mod: CPTII,S$GLB,, | Performed by: INTERNAL MEDICINE

## 2021-12-28 PROCEDURE — 4010F ACE/ARB THERAPY RXD/TAKEN: CPT | Mod: CPTII,S$GLB,, | Performed by: INTERNAL MEDICINE

## 2021-12-28 PROCEDURE — 4010F PR ACE/ARB THEARPY RXD/TAKEN: ICD-10-PCS | Mod: CPTII,S$GLB,, | Performed by: INTERNAL MEDICINE

## 2021-12-28 PROCEDURE — 3008F BODY MASS INDEX DOCD: CPT | Mod: CPTII,S$GLB,, | Performed by: INTERNAL MEDICINE

## 2021-12-28 PROCEDURE — 3008F PR BODY MASS INDEX (BMI) DOCUMENTED: ICD-10-PCS | Mod: CPTII,S$GLB,, | Performed by: INTERNAL MEDICINE

## 2021-12-28 PROCEDURE — 99999 PR PBB SHADOW E&M-EST. PATIENT-LVL IV: ICD-10-PCS | Mod: PBBFAC,,, | Performed by: INTERNAL MEDICINE

## 2021-12-28 PROCEDURE — 1159F PR MEDICATION LIST DOCUMENTED IN MEDICAL RECORD: ICD-10-PCS | Mod: CPTII,S$GLB,, | Performed by: INTERNAL MEDICINE

## 2021-12-28 PROCEDURE — 99395 PR PREVENTIVE VISIT,EST,18-39: ICD-10-PCS | Mod: 25,S$GLB,, | Performed by: INTERNAL MEDICINE

## 2021-12-28 PROCEDURE — 90471 IMMUNIZATION ADMIN: CPT | Mod: S$GLB,,, | Performed by: INTERNAL MEDICINE

## 2021-12-28 PROCEDURE — 1160F RVW MEDS BY RX/DR IN RCRD: CPT | Mod: CPTII,S$GLB,, | Performed by: INTERNAL MEDICINE

## 2021-12-28 PROCEDURE — 99999 PR PBB SHADOW E&M-EST. PATIENT-LVL IV: CPT | Mod: PBBFAC,,, | Performed by: INTERNAL MEDICINE

## 2021-12-28 PROCEDURE — 1159F MED LIST DOCD IN RCRD: CPT | Mod: CPTII,S$GLB,, | Performed by: INTERNAL MEDICINE

## 2021-12-28 PROCEDURE — 90686 IIV4 VACC NO PRSV 0.5 ML IM: CPT | Mod: S$GLB,,, | Performed by: INTERNAL MEDICINE

## 2021-12-28 PROCEDURE — 3079F PR MOST RECENT DIASTOLIC BLOOD PRESSURE 80-89 MM HG: ICD-10-PCS | Mod: CPTII,S$GLB,, | Performed by: INTERNAL MEDICINE

## 2021-12-28 PROCEDURE — 90471 FLU VACCINE (QUAD) GREATER THAN OR EQUAL TO 3YO PRESERVATIVE FREE IM: ICD-10-PCS | Mod: S$GLB,,, | Performed by: INTERNAL MEDICINE

## 2021-12-28 PROCEDURE — 90686 FLU VACCINE (QUAD) GREATER THAN OR EQUAL TO 3YO PRESERVATIVE FREE IM: ICD-10-PCS | Mod: S$GLB,,, | Performed by: INTERNAL MEDICINE

## 2021-12-28 PROCEDURE — 99395 PREV VISIT EST AGE 18-39: CPT | Mod: 25,S$GLB,, | Performed by: INTERNAL MEDICINE

## 2021-12-28 PROCEDURE — 3074F PR MOST RECENT SYSTOLIC BLOOD PRESSURE < 130 MM HG: ICD-10-PCS | Mod: CPTII,S$GLB,, | Performed by: INTERNAL MEDICINE

## 2021-12-28 PROCEDURE — 1160F PR REVIEW ALL MEDS BY PRESCRIBER/CLIN PHARMACIST DOCUMENTED: ICD-10-PCS | Mod: CPTII,S$GLB,, | Performed by: INTERNAL MEDICINE

## 2021-12-28 RX ORDER — TRIAMCINOLONE ACETONIDE 1 MG/G
OINTMENT TOPICAL 2 TIMES DAILY
COMMUNITY
End: 2024-03-10

## 2021-12-28 RX ORDER — VENLAFAXINE 75 MG/1
75 TABLET ORAL DAILY
COMMUNITY
End: 2021-12-28 | Stop reason: DRUGHIGH

## 2021-12-28 RX ORDER — VENLAFAXINE HYDROCHLORIDE 37.5 MG/1
37.5 CAPSULE, EXTENDED RELEASE ORAL DAILY
Qty: 14 CAPSULE | Refills: 0 | Status: SHIPPED | OUTPATIENT
Start: 2021-12-28 | End: 2022-05-29 | Stop reason: SDUPTHER

## 2021-12-28 RX ORDER — LISINOPRIL 10 MG/1
10 TABLET ORAL DAILY
Qty: 90 TABLET | Refills: 3 | Status: SHIPPED | OUTPATIENT
Start: 2021-12-28 | End: 2023-01-30

## 2021-12-28 RX ORDER — VENLAFAXINE 75 MG/1
75 TABLET ORAL DAILY
Status: CANCELLED | OUTPATIENT
Start: 2021-12-28

## 2022-01-14 ENCOUNTER — PATIENT MESSAGE (OUTPATIENT)
Dept: ADMINISTRATIVE | Facility: OTHER | Age: 37
End: 2022-01-14
Payer: COMMERCIAL

## 2022-01-25 ENCOUNTER — OFFICE VISIT (OUTPATIENT)
Dept: FAMILY MEDICINE | Facility: CLINIC | Age: 37
End: 2022-01-25
Payer: COMMERCIAL

## 2022-01-25 DIAGNOSIS — J45.901 ASTHMA EXACERBATION, MILD: Primary | ICD-10-CM

## 2022-01-25 PROCEDURE — 99214 OFFICE O/P EST MOD 30 MIN: CPT | Mod: 95,,, | Performed by: INTERNAL MEDICINE

## 2022-01-25 PROCEDURE — 1159F MED LIST DOCD IN RCRD: CPT | Mod: CPTII,95,, | Performed by: INTERNAL MEDICINE

## 2022-01-25 PROCEDURE — 1160F RVW MEDS BY RX/DR IN RCRD: CPT | Mod: CPTII,95,, | Performed by: INTERNAL MEDICINE

## 2022-01-25 PROCEDURE — 1159F PR MEDICATION LIST DOCUMENTED IN MEDICAL RECORD: ICD-10-PCS | Mod: CPTII,95,, | Performed by: INTERNAL MEDICINE

## 2022-01-25 PROCEDURE — 1160F PR REVIEW ALL MEDS BY PRESCRIBER/CLIN PHARMACIST DOCUMENTED: ICD-10-PCS | Mod: CPTII,95,, | Performed by: INTERNAL MEDICINE

## 2022-01-25 PROCEDURE — 99214 PR OFFICE/OUTPT VISIT, EST, LEVL IV, 30-39 MIN: ICD-10-PCS | Mod: 95,,, | Performed by: INTERNAL MEDICINE

## 2022-01-25 RX ORDER — AMOXICILLIN 500 MG/1
500 TABLET, FILM COATED ORAL EVERY 12 HOURS
Qty: 14 TABLET | Refills: 0 | Status: SHIPPED | OUTPATIENT
Start: 2022-01-25 | End: 2022-02-01

## 2022-01-25 RX ORDER — METHYLPREDNISOLONE 4 MG/1
TABLET ORAL
Qty: 1 EACH | Refills: 0 | Status: SHIPPED | OUTPATIENT
Start: 2022-01-25 | End: 2022-02-15

## 2022-01-25 NOTE — PROGRESS NOTES
The patient location is: place of employment   The chief complaint leading to consultation is:asthma exacerbation     Visit type: audio visual tele visit     Face to Face time with patient: 15 min  20 minutes of total time spent on the encounter, which includes face to face time and non-face to face time preparing to see the patient (eg, review of tests), Obtaining and/or reviewing separately obtained history, Documenting clinical information in the electronic or other health record, Independently interpreting results (not separately reported) and communicating results to the patient/family/caregiver, or Care coordination (not separately reported).         Each patient to whom he or she provides medical services by telemedicine is:  (1) informed of the relationship between the physician and patient and the respective role of any other health care provider with respect to management of the patient; and (2) notified that he or she may decline to receive medical services by telemedicine and may withdraw from such care at any time.    Notes:   [facility] Note    Chief Complaint      Chief Complaint   Patient presents with    COVID-19 Concerns       History of Present Illness      Michelle Izaguirre is a 36 y.o. female who presents today for   Chief Complaint   Patient presents with    COVID-19 Concerns   .  Patient comes to appointment here for acute virtual visit related to covid concerns . She had covid infection 10 days ago , she has history of asthma and has been having issues with wheezing since diagnosis . She has a pulse ox which is consistently running in 96% range . Her main issue is with wheezing and cest tightness with sputum production .     Problem List Items Addressed This Visit        Pulmonary    Asthma exacerbation, mild - Primary    Overview     Amoxil 500 mg bid disp 14   Medrol dose pack  Cont inhalers                  Past Medical History:  Past Medical History:   Diagnosis  Date    Allergy     Asthma     Eczema     Hiatal hernia 2009    Hypertension        Past Surgical History:  Past Surgical History:   Procedure Laterality Date    tonsilectomy      TONSILLECTOMY         Family History:  family history includes Allergies in her father and mother; Asthma in her father and mother; Colon cancer in her maternal grandfather; Eczema in her mother; Heart disease in her maternal grandmother; Heart failure in her mother and paternal grandmother; Hypertension in her father and mother; Leukemia in her maternal grandfather; Rheum arthritis in her mother.    Social History:  Social History     Socioeconomic History    Marital status:    Tobacco Use    Smoking status: Former Smoker     Packs/day: 0.50     Types: Cigarettes     Quit date: 4/10/2018     Years since quitting: 3.7    Smokeless tobacco: Never Used   Substance and Sexual Activity    Alcohol use: Yes    Drug use: No    Sexual activity: Yes     Partners: Male     Birth control/protection: None   Social History Narrative    Tobacco: Intermittent use; once per month has 7 cigarettes; Used ages 18 yo-32 yo; max use 0.5 ppd    EtOH: Twice per week, 2 drinks at one time    Drug: None    Employment: Teacher ()     Education: Bachelor's     Lives with  and 10 yo step-son      Social Determinants of Health     Financial Resource Strain: Low Risk     Difficulty of Paying Living Expenses: Not hard at all   Food Insecurity: No Food Insecurity    Worried About Running Out of Food in the Last Year: Never true    Ran Out of Food in the Last Year: Never true   Transportation Needs: No Transportation Needs    Lack of Transportation (Medical): No    Lack of Transportation (Non-Medical): No   Physical Activity: Insufficiently Active    Days of Exercise per Week: 1 day    Minutes of Exercise per Session: 10 min   Stress: Stress Concern Present    Feeling of Stress : To some extent   Social Connections: Unknown     Frequency of Communication with Friends and Family: More than three times a week    Frequency of Social Gatherings with Friends and Family: Once a week    Active Member of Clubs or Organizations: No    Attends Club or Organization Meetings: Patient refused    Marital Status: Living with partner   Housing Stability: Low Risk     Unable to Pay for Housing in the Last Year: No    Number of Places Lived in the Last Year: 2    Unstable Housing in the Last Year: No       Review of Systems:   Review of Systems   Constitutional: Negative for fever.   HENT: Positive for sore throat. Negative for ear pain.    Respiratory: Positive for sputum production, shortness of breath and wheezing. Negative for hemoptysis.    Cardiovascular: Positive for chest pain, orthopnea and PND. Negative for claudication and leg swelling.   Gastrointestinal: Negative for abdominal pain and vomiting.   Musculoskeletal: Negative for neck pain.   Skin: Positive for rash.   Neurological: Positive for headaches.         Medications:  Outpatient Encounter Medications as of 1/25/2022   Medication Sig Dispense Refill    albuterol (PROAIR HFA) 90 mcg/actuation inhaler Inhale 2 puffs into the lungs every 6 (six) hours as needed for Wheezing. Rescue 18 g 2    amoxicillin (AMOXIL) 500 MG Tab Take 1 tablet (500 mg total) by mouth every 12 (twelve) hours. for 7 days 14 tablet 0    cetirizine (ZYRTEC) 10 MG tablet Take 10 mg by mouth once daily.      diclofenac sodium (VOLTAREN) 1 % Gel 2 g.      FLOVENT HFA 44 mcg/actuation inhaler INL 2 PFS PO BID 10.6 g 3    levalbuterol (XOPENEX) 1.25 mg/3 mL nebulizer solution 3 mLs.      lisinopriL 10 MG tablet Take 1 tablet (10 mg total) by mouth once daily. 90 tablet 3    methylPREDNISolone (MEDROL DOSEPACK) 4 mg tablet use as directed 1 each 0    triamcinolone acetonide 0.1% (KENALOG) 0.1 % ointment Apply topically 2 (two) times daily.      venlafaxine (EFFEXOR-XR) 37.5 MG 24 hr capsule Take 1  capsule (37.5 mg total) by mouth once daily. 14 capsule 0     No facility-administered encounter medications on file as of 1/25/2022.        Allergies:  Review of patient's allergies indicates:   Allergen Reactions    Azithromycin     Erythromycin Rash         Physical Exam      There were no vitals filed for this visit.   There is no height or weight on file to calculate BMI.    Physical Exam  Constitutional:       General: She is not in acute distress.     Appearance: Normal appearance. She is not ill-appearing.   Eyes:      General:         Right eye: No discharge.         Left eye: No discharge.      Extraocular Movements: Extraocular movements intact.      Pupils: Pupils are equal, round, and reactive to light.   Pulmonary:      Effort: Pulmonary effort is normal.   Neurological:      General: No focal deficit present.      Mental Status: She is alert and oriented to person, place, and time.   Psychiatric:         Mood and Affect: Mood normal.         Behavior: Behavior normal.         Judgment: Judgment normal.          Laboratory:  CBC:  No results for input(s): WBC, RBC, HGB, HCT, PLT, MCV, MCH, MCHC in the last 2160 hours.  CMP:  No results for input(s): GLU, CALCIUM, ALBUMIN, PROT, NA, K, CO2, CL, BUN, ALKPHOS, ALT, AST, BILITOT in the last 2160 hours.    Invalid input(s): CREATININ  URINALYSIS:  No results for input(s): COLORU, CLARITYU, SPECGRAV, PHUR, PROTEINUA, GLUCOSEU, BILIRUBINCON, BLOODU, WBCU, RBCU, BACTERIA, MUCUS, NITRITE, LEUKOCYTESUR, UROBILINOGEN, HYALINECASTS in the last 2160 hours.   LIPIDS:  No results for input(s): TSH, HDL, CHOL, TRIG, LDLCALC, CHOLHDL, NONHDLCHOL, TOTALCHOLEST in the last 2160 hours.  TSH:  No results for input(s): TSH in the last 2160 hours.  A1C:  No results for input(s): HGBA1C in the last 2160 hours.    Radiology:        Assessment:     Michelle Izaguirre is a 36 y.o.female with:    Asthma exacerbation, mild  -     amoxicillin (AMOXIL) 500 MG Tab; Take 1 tablet (500 mg  total) by mouth every 12 (twelve) hours. for 7 days  Dispense: 14 tablet; Refill: 0  -     methylPREDNISolone (MEDROL DOSEPACK) 4 mg tablet; use as directed  Dispense: 1 each; Refill: 0          Plan:     Problem List Items Addressed This Visit        Pulmonary    Asthma exacerbation, mild - Primary    Overview     Amoxil 500 mg bid disp 14   Medrol dose pack  Cont inhalers                As above, continue current medications and maintain follow up with specialists.  Return to clinic as scheduled       Frederick W Dantagnan [facility] [place]                Answers for HPI/ROS submitted by the patient on 1/25/2022  Chronicity: new  Onset: 1 to 4 weeks ago  Frequency: daily  Progression since onset: waxing and waning  Episode duration: 15 minutes  coryza: Yes  leg pain: No  rhinorrhea: No  swollen glands: Yes  syncope: No  Aggravating factors: emotional upset, exercise, URIs, any activity, weather changes, lying flat  Improvement on treatment: significant  Risk factors for DVT/PE: no known risk factors  Treatments tried: OTC cough suppressants, beta agonist inhalers, rest, steroid inhalers  asthma: Yes  allergies: Yes  COPD: No  pneumonia: No  aspirin allergies: No  CAD: No  DVT: No  heart failure: No  PE: No  recent surgery: No  bronchiolitis: No  chronic lung disease: No

## 2022-02-16 ENCOUNTER — PATIENT OUTREACH (OUTPATIENT)
Dept: ADMINISTRATIVE | Facility: OTHER | Age: 37
End: 2022-02-16
Payer: COMMERCIAL

## 2022-02-18 ENCOUNTER — OFFICE VISIT (OUTPATIENT)
Dept: DERMATOLOGY | Facility: CLINIC | Age: 37
End: 2022-02-18
Payer: COMMERCIAL

## 2022-02-18 VITALS — WEIGHT: 293 LBS | BODY MASS INDEX: 45.89 KG/M2

## 2022-02-18 DIAGNOSIS — D22.9 NEVUS OF MULTIPLE SITES: ICD-10-CM

## 2022-02-18 DIAGNOSIS — L30.9 ECZEMA OF BOTH HANDS: Primary | ICD-10-CM

## 2022-02-18 DIAGNOSIS — L30.9 DERMATITIS: ICD-10-CM

## 2022-02-18 DIAGNOSIS — L73.9 FOLLICULITIS: ICD-10-CM

## 2022-02-18 PROCEDURE — 1159F MED LIST DOCD IN RCRD: CPT | Mod: CPTII,S$GLB,, | Performed by: DERMATOLOGY

## 2022-02-18 PROCEDURE — 3008F BODY MASS INDEX DOCD: CPT | Mod: CPTII,S$GLB,, | Performed by: DERMATOLOGY

## 2022-02-18 PROCEDURE — 99999 PR PBB SHADOW E&M-EST. PATIENT-LVL III: ICD-10-PCS | Mod: PBBFAC,,, | Performed by: DERMATOLOGY

## 2022-02-18 PROCEDURE — 1159F PR MEDICATION LIST DOCUMENTED IN MEDICAL RECORD: ICD-10-PCS | Mod: CPTII,S$GLB,, | Performed by: DERMATOLOGY

## 2022-02-18 PROCEDURE — 99999 PR PBB SHADOW E&M-EST. PATIENT-LVL III: CPT | Mod: PBBFAC,,, | Performed by: DERMATOLOGY

## 2022-02-18 PROCEDURE — 3008F PR BODY MASS INDEX (BMI) DOCUMENTED: ICD-10-PCS | Mod: CPTII,S$GLB,, | Performed by: DERMATOLOGY

## 2022-02-18 PROCEDURE — 99204 PR OFFICE/OUTPT VISIT, NEW, LEVL IV, 45-59 MIN: ICD-10-PCS | Mod: S$GLB,,, | Performed by: DERMATOLOGY

## 2022-02-18 PROCEDURE — 1160F RVW MEDS BY RX/DR IN RCRD: CPT | Mod: CPTII,S$GLB,, | Performed by: DERMATOLOGY

## 2022-02-18 PROCEDURE — 1160F PR REVIEW ALL MEDS BY PRESCRIBER/CLIN PHARMACIST DOCUMENTED: ICD-10-PCS | Mod: CPTII,S$GLB,, | Performed by: DERMATOLOGY

## 2022-02-18 PROCEDURE — 99204 OFFICE O/P NEW MOD 45 MIN: CPT | Mod: S$GLB,,, | Performed by: DERMATOLOGY

## 2022-02-18 RX ORDER — DOXYCYCLINE HYCLATE 100 MG
TABLET ORAL
Qty: 14 TABLET | Refills: 0 | Status: SHIPPED | OUTPATIENT
Start: 2022-02-18 | End: 2022-03-04

## 2022-02-18 RX ORDER — MOMETASONE FUROATE 1 MG/G
CREAM TOPICAL
Qty: 50 G | Refills: 3 | Status: SHIPPED | OUTPATIENT
Start: 2022-02-18 | End: 2024-03-10

## 2022-02-18 NOTE — PROGRESS NOTES
Subjective:       Patient ID:  Michelle Izaguirre is a 36 y.o. female who presents for   Chief Complaint   Patient presents with    Rash     hands     Rash on hands which itches , washes dishes no gloves.  Also spots in pubic area where shaved hair, one broke when it was scratched now inflamed.       Review of Systems   Constitutional: Negative for fever, chills, weight loss, weight gain, fatigue, night sweats and malaise.   Skin: Positive for itching and rash. Negative for daily sunscreen use, activity-related sunscreen use and wears hat.   Hematologic/Lymphatic: Does not bruise/bleed easily.        Objective:    Physical Exam   Constitutional: She appears well-developed and well-nourished.   Neurological: She is alert and oriented to person, place, and time.   Psychiatric: She has a normal mood and affect.   Skin:   Areas Examined (abnormalities noted in diagram):   Genitals / Buttocks / Groin Inspection Performed  RUE Inspected  LUE Inspection Performed  Nails and Digits Inspection Performed                  Diagram Legend     Erythematous scaling macule/papule c/w actinic keratosis       Vascular papule c/w angioma      Pigmented verrucoid papule/plaque c/w seborrheic keratosis      Yellow umbilicated papule c/w sebaceous hyperplasia      Irregularly shaped tan macule c/w lentigo     1-2 mm smooth white papules consistent with Milia      Movable subcutaneous cyst with punctum c/w epidermal inclusion cyst      Subcutaneous movable cyst c/w pilar cyst      Firm pink to brown papule c/w dermatofibroma      Pedunculated fleshy papule(s) c/w skin tag(s)      Evenly pigmented macule c/w junctional nevus     Mildly variegated pigmented, slightly irregular-bordered macule c/w mildly atypical nevus      Flesh colored to evenly pigmented papule c/w intradermal nevus       Pink pearly papule/plaque c/w basal cell carcinoma      Erythematous hyperkeratotic cursted plaque c/w SCC      Surgical scar with no sign of skin cancer  "recurrence      Open and closed comedones      Inflammatory papules and pustules      Verrucoid papule consistent consistent with wart     Erythematous eczematous patches and plaques     Dystrophic onycholytic nail with subungual debris c/w onychomycosis     Umbilicated papule    Erythematous-base heme-crusted tan verrucoid plaque consistent with inflamed seborrheic keratosis     Erythematous Silvery Scaling Plaque c/w Psoriasis     See annotation      Assessment / Plan:        Eczema of both hands  -     mometasone 0.1% (ELOCON) 0.1 % cream; Use daily prn for rash  Dispense: 50 g; Refill: 3  Brochure provided  Lotion after hand washing  Gloves for dish washing    Dermatitis  -     Ambulatory referral/consult to Dermatology    Nevus of multiple sites  The "ABCD" rules to observe pigmented lesions were reviewed.    Folliculitis  -     doxycycline (VIBRA-TABS) 100 MG tablet; Take one qd with food  Dispense: 14 tablet; Refill: 0  Vinegar and water 1/2 each use as compress bid               Follow up in about 1 year (around 2/18/2023).  "

## 2022-04-04 PROBLEM — Z00.00 ANNUAL PHYSICAL EXAM: Status: RESOLVED | Noted: 2021-12-28 | Resolved: 2022-04-04

## 2022-05-12 NOTE — PATIENT INSTRUCTIONS
The Flu (Influenza)     The virus that causes the flu spreads through the air in droplets when someone who has the flu coughs, sneezes, laughs, or talks.   The flu (influenza) is an infection that affects your respiratory tract. This tract is made up of your mouth, nose, and lungs, and the passages between them. Unlike a cold, the flu can make you very ill. And it can lead to pneumonia, a serious lung infection. The flu can have serious complications and even cause death.  Who is at risk for the flu?  Anyone can get the flu. But you are more likely to become infected if you:  · Have a weakened immune system  · Work in a healthcare setting where you may be exposed to flu germs  · Live or work with someone who has the flu  · Havent had an annual flu shot  How does the flu spread?  The flu is caused by a virus. The virus spreads through the air in droplets when someone who has the flu coughs, sneezes, laughs, or talks. You can become infected when you inhale these viruses directly. You can also become infected when you touch a surface on which the droplets have landed and then transfer the germs to your eyes, nose, or mouth. Touching used tissues, or sharing utensils, drinking glasses, or a toothbrush from an infected person can expose you to flu viruses, too.  What are the symptoms of the flu?  Flu symptoms tend to come on quickly and may last a few days to a few weeks. They include:  · Fever usually higher than 100.4°F  (38°C) and chills  · Sore throat and headache  · Dry cough  · Runny nose  · Tiredness and weakness  · Muscle aches  Who is at risk for flu complications?  For some people, the flu can be very serious. The risk for complications is greater for:  · Children younger than age 5  · Adults ages 65 and older  · People with a chronic illness such as diabetes or heart, kidney, or lung disease  · People who live in a nursing home or long-term care facility   How is the flu treated?  The flu usually gets  better after 7 days or so. In some cases, your healthcare provider may prescribe an antiviral medicine. This may help you get well a little sooner. For the medicine to help, you need to take it as soon as possible (ideally within 48 hours) after your symptoms start. If you develop pneumonia or other serious illness, you may need to stay in the hospital.  Easing flu symptoms  · Drink lots of fluids such as water, juice, and warm soup. A good rule is to drink enough so that you urinate your normal amount.  · Get plenty of rest.  · Ask your healthcare provider what to take for fever and pain.  · Call your provider if your fever is 100.4°F (38°C) or higher, or you become dizzy, lightheaded, or short of breath.  Taking steps to protect others  · Wash your hands often, especially after coughing or sneezing. Or clean your hands with an alcohol-based hand  containing at least 60% alcohol.  · Cough or sneeze into a tissue. Then throw the tissue away and wash your hands. If you dont have a tissue, cough and sneeze into your elbow.  · Stay home until at least 24 hours after you no longer have a fever or chills. Be sure the fever isnt being hidden by fever-reducing medicine.  · Dont share food, utensils, drinking glasses, or a toothbrush with others.  · Ask your healthcare provider if others in your household should get antiviral medicine to help them avoid infection.  How can the flu be prevented?  · One of the best ways to avoid the flu is to get a flu vaccine each year. The virus that causes the flu changes from year to year. For that reason, healthcare providers recommend getting the flu vaccine each year, as soon as it's available in your area. The vaccine is given as a shot. Your healthcare provider can tell you which vaccine is right for you. A nasal spray is also available but is not recommended for the 2064-5216 flu season. The CDC says this is because the nasal spray did not seem to protect against the flu  over the last several flu seasons. In the past, it was meant for people ages 2 to 49.  · Wash your hands often. Frequent handwashing is a proven way to help prevent infection.  · Carry an alcohol-based hand gel containing at least 60% alcohol. Use it when you can't use soap and water. Then wash your hands as soon as you can.  · Avoid touching your eyes, nose, and mouth.  · At home and work, clean phones, computer keyboards, and toys often with disinfectant wipes.  · If possible, avoid close contact with others who have the flu or symptoms of the flu.  Handwashing tips  Handwashing is one of the best ways to prevent many common infections. If you are caring for or visiting someone with the flu, wash your hands each time you enter and leave the room. Follow these steps:  · Use warm water and plenty of soap. Rub your hands together well.  · Clean the whole hand, including under your nails, between your fingers, and up the wrists.  · Wash for at least 15 seconds.  · Rinse, letting the water run down your fingers, not up your wrists.  · Dry your hands well. Use a paper towel to turn off the faucet and open the door.  Using alcohol-based hand   Alcohol-based hand  are also a good choice. Use them when you can't use soap and water. Follow these steps:  · Squeeze about a tablespoon of gel into the palm of one hand.  · Rub your hands together briskly, cleaning the backs of your hands, the palms, between your fingers, and up the wrists.  · Rub until the gel is gone and your hands are completely dry.  Preventing the flu in healthcare settings  The flu is a special concern for people in hospitals and long-term care facilities. To help prevent the spread of flu, many hospitals and nursing homes take these steps:  · Healthcare providers wash their hands or use an alcohol-based hand  before and after treating each patient.  · People with the flu have private rooms and bathrooms or share a room with someone  with the same infection.  · People who are at high risk for the flu but don't have it are encouraged to get the flu and pneumonia vaccines.  · All healthcare workers are encouraged or required to get flu shots.   Date Last Reviewed: 12/1/2016  © 5860-1772 [employer]. [address] [place] [address]. All rights reserved. This information is not intended as a substitute for professional medical care. Always follow your healthcare professional's instructions.      Please follow up with your Primary care provider within 2-5 days if your signs and symptoms have not resolved or worsen.     If your condition worsens or fails to improve we recommend that you receive another evaluation at the emergency room immediately or contact your primary medical clinic to discuss your concerns.   You must understand that you have received an Urgent Care treatment only and that you may be released before all of your medical problems are known or treated. You, the patient, will arrange for follow up care as instructed.     RED FLAGS/WARNING SYMPTOMS DISCUSSED WITH PATIENT THAT WOULD WARRANT EMERGENT MEDICAL ATTENTION. PATIENT VERBALIZED UNDERSTANDING.     Elevated Blood Pressure  Your blood pressure was elevated during your visit to the urgent care.  It was not so high that immediate care was needed but it is recommended that you monitor your blood pressure over the next week or two to make sure that it is not staying elevated.  Please have your blood pressure taken 2-3 times daily at different times of the day.  Write all of those blood pressures down and record the time that they were taken.  Keep all that information and take it with you to see your Primary Care Physician.  If your blood pressure is consistently above 140/90 you will need to follow up with your PCP more quickly     Allergy;

## 2022-05-29 NOTE — TELEPHONE ENCOUNTER
Care Due:                  Date            Visit Type   Department     Provider  --------------------------------------------------------------------------------                                ESTABLISHED                              PATIENT -    [facility]  Last Visit: 01-      [facility]  Paul Aguilar  Next Visit: None Scheduled  None         None Found                                                            Last  Test          Frequency    Reason                     Performed    Due Date  --------------------------------------------------------------------------------    CMP.........  12 months..  lisinopriL, venlafaxine..  Not Found    Overdue    [facility] Embedded Care Gaps. Reference number: [id number]. 5/29/2022   11:55:27 AM CDT

## 2022-05-30 RX ORDER — VENLAFAXINE HYDROCHLORIDE 75 MG/1
75 CAPSULE, EXTENDED RELEASE ORAL DAILY
Qty: 30 CAPSULE | Refills: 5 | Status: SHIPPED | OUTPATIENT
Start: 2022-05-30 | End: 2022-11-28

## 2022-07-07 ENCOUNTER — OFFICE VISIT (OUTPATIENT)
Dept: INTERNAL MEDICINE | Facility: CLINIC | Age: 37
End: 2022-07-07
Payer: COMMERCIAL

## 2022-07-07 VITALS
DIASTOLIC BLOOD PRESSURE: 80 MMHG | HEIGHT: 67 IN | WEIGHT: 278.13 LBS | TEMPERATURE: 98 F | BODY MASS INDEX: 43.65 KG/M2 | HEART RATE: 81 BPM | SYSTOLIC BLOOD PRESSURE: 120 MMHG | RESPIRATION RATE: 18 BRPM | OXYGEN SATURATION: 97 %

## 2022-07-07 DIAGNOSIS — M72.2 PLANTAR FASCIITIS OF RIGHT FOOT: Primary | ICD-10-CM

## 2022-07-07 PROCEDURE — 99999 PR PBB SHADOW E&M-EST. PATIENT-LVL IV: CPT | Mod: PBBFAC,,, | Performed by: INTERNAL MEDICINE

## 2022-07-07 PROCEDURE — 99999 PR PBB SHADOW E&M-EST. PATIENT-LVL IV: ICD-10-PCS | Mod: PBBFAC,,, | Performed by: INTERNAL MEDICINE

## 2022-07-07 PROCEDURE — 99214 PR OFFICE/OUTPT VISIT, EST, LEVL IV, 30-39 MIN: ICD-10-PCS | Mod: S$GLB,,, | Performed by: INTERNAL MEDICINE

## 2022-07-07 PROCEDURE — 3074F SYST BP LT 130 MM HG: CPT | Mod: CPTII,S$GLB,, | Performed by: INTERNAL MEDICINE

## 2022-07-07 PROCEDURE — 1159F MED LIST DOCD IN RCRD: CPT | Mod: CPTII,S$GLB,, | Performed by: INTERNAL MEDICINE

## 2022-07-07 PROCEDURE — 1160F PR REVIEW ALL MEDS BY PRESCRIBER/CLIN PHARMACIST DOCUMENTED: ICD-10-PCS | Mod: CPTII,S$GLB,, | Performed by: INTERNAL MEDICINE

## 2022-07-07 PROCEDURE — 3074F PR MOST RECENT SYSTOLIC BLOOD PRESSURE < 130 MM HG: ICD-10-PCS | Mod: CPTII,S$GLB,, | Performed by: INTERNAL MEDICINE

## 2022-07-07 PROCEDURE — 1159F PR MEDICATION LIST DOCUMENTED IN MEDICAL RECORD: ICD-10-PCS | Mod: CPTII,S$GLB,, | Performed by: INTERNAL MEDICINE

## 2022-07-07 PROCEDURE — 3008F BODY MASS INDEX DOCD: CPT | Mod: CPTII,S$GLB,, | Performed by: INTERNAL MEDICINE

## 2022-07-07 PROCEDURE — 3079F PR MOST RECENT DIASTOLIC BLOOD PRESSURE 80-89 MM HG: ICD-10-PCS | Mod: CPTII,S$GLB,, | Performed by: INTERNAL MEDICINE

## 2022-07-07 PROCEDURE — 99214 OFFICE O/P EST MOD 30 MIN: CPT | Mod: S$GLB,,, | Performed by: INTERNAL MEDICINE

## 2022-07-07 PROCEDURE — 3008F PR BODY MASS INDEX (BMI) DOCUMENTED: ICD-10-PCS | Mod: CPTII,S$GLB,, | Performed by: INTERNAL MEDICINE

## 2022-07-07 PROCEDURE — 1160F RVW MEDS BY RX/DR IN RCRD: CPT | Mod: CPTII,S$GLB,, | Performed by: INTERNAL MEDICINE

## 2022-07-07 PROCEDURE — 3079F DIAST BP 80-89 MM HG: CPT | Mod: CPTII,S$GLB,, | Performed by: INTERNAL MEDICINE

## 2022-07-07 NOTE — PROGRESS NOTES
[facility] Note    Chief Complaint      Chief Complaint   Patient presents with    Foot Pain       History of Present Illness      Michelle Izaguirre is a 36 y.o. female who presents today for   Chief Complaint   Patient presents with    Foot Pain   .  Patient comes to appointment today for above . 6 months of pain in arch of foot especially with walking . Is worse when she first starts out of bed in am . Persists throughout day but gets more manageable .     Problem List Items Addressed This Visit        Orthopedic    Plantar fasciitis of right foot - Primary    Overview     Discussed with patient need for arch support  Will provide patent information   Ibuprofen 800 mg po tid   If no relief after week will refer to podiatry                   Past Medical History:  Past Medical History:   Diagnosis Date    Allergy     Asthma     Eczema     Hiatal hernia 2009    Hypertension        Past Surgical History:  Past Surgical History:   Procedure Laterality Date    tonsilectomy      TONSILLECTOMY         Family History:  family history includes Allergies in her father and mother; Asthma in her father and mother; Colon cancer in her maternal grandfather; Eczema in her mother; Heart disease in her maternal grandmother; Heart failure in her mother and paternal grandmother; Hypertension in her father and mother; Leukemia in her maternal grandfather; Rheum arthritis in her mother.    Social History:  Social History     Socioeconomic History    Marital status:    Tobacco Use    Smoking status: Former Smoker     Packs/day: 0.50     Types: Cigarettes     Quit date: 4/10/2018     Years since quittin.2    Smokeless tobacco: Never Used   Substance and Sexual Activity    Alcohol use: Not Currently    Drug use: No    Sexual activity: Yes     Partners: Male     Birth control/protection: None   Social History Narrative    Tobacco: Intermittent use; once per month has 7 cigarettes;  Used ages 20 yo-34 yo; max use 0.5 ppd    EtOH: Twice per week, 2 drinks at one time    Drug: None    Employment: Teacher ()     Education: Bachelor's     Lives with  and 8 yo step-son      Social Determinants of Health     Financial Resource Strain: Low Risk     Difficulty of Paying Living Expenses: Not hard at all   Food Insecurity: No Food Insecurity    Worried About Running Out of Food in the Last Year: Never true    Ran Out of Food in the Last Year: Never true   Transportation Needs: No Transportation Needs    Lack of Transportation (Medical): No    Lack of Transportation (Non-Medical): No   Physical Activity: Insufficiently Active    Days of Exercise per Week: 1 day    Minutes of Exercise per Session: 10 min   Stress: Stress Concern Present    Feeling of Stress : To some extent   Social Connections: Unknown    Frequency of Communication with Friends and Family: More than three times a week    Frequency of Social Gatherings with Friends and Family: Once a week    Active Member of Clubs or Organizations: No    Attends Club or Organization Meetings: Patient refused    Marital Status: Living with partner   Housing Stability: Low Risk     Unable to Pay for Housing in the Last Year: No    Number of Places Lived in the Last Year: 2    Unstable Housing in the Last Year: No       Review of Systems:   Review of Systems   Constitutional: Negative for fever and weight loss.   HENT: Negative for congestion, hearing loss and sore throat.    Eyes: Negative for blurred vision.   Respiratory: Negative for cough and shortness of breath.    Cardiovascular: Negative for chest pain, palpitations, claudication and leg swelling.   Gastrointestinal: Negative for abdominal pain, constipation, diarrhea and heartburn.   Genitourinary: Negative for dysuria.   Musculoskeletal: Negative for back pain and myalgias.   Skin: Negative for rash.   Neurological: Negative for focal weakness and headaches.    Psychiatric/Behavioral: Negative for depression and suicidal ideas. The patient is not nervous/anxious.          Medications:  Outpatient Encounter Medications as of 7/7/2022   Medication Sig Dispense Refill    cetirizine (ZYRTEC) 10 MG tablet Take 10 mg by mouth once daily.      diclofenac sodium (VOLTAREN) 1 % Gel 2 g.      FLOVENT HFA 44 mcg/actuation inhaler INL 2 PFS PO BID 10.6 g 3    levalbuterol (XOPENEX) 1.25 mg/3 mL nebulizer solution 3 mLs.      lisinopriL 10 MG tablet Take 1 tablet (10 mg total) by mouth once daily. 90 tablet 3    mometasone 0.1% (ELOCON) 0.1 % cream Use daily prn for rash 50 g 3    triamcinolone acetonide 0.1% (KENALOG) 0.1 % ointment Apply topically 2 (two) times daily.      venlafaxine (EFFEXOR-XR) 75 MG 24 hr capsule Take 1 capsule (75 mg total) by mouth once daily. 30 capsule 5    albuterol (PROAIR HFA) 90 mcg/actuation inhaler Inhale 2 puffs into the lungs every 6 (six) hours as needed for Wheezing. Rescue 18 g 2     No facility-administered encounter medications on file as of 7/7/2022.        Allergies:  Review of patient's allergies indicates:   Allergen Reactions    Azithromycin     Erythromycin Rash         Physical Exam         Vitals:    07/07/22 0849   BP: 120/80   Pulse: 81   Resp: 18   Temp: 98 °F (36.7 °C)         Physical Exam  Constitutional:       Appearance: She is well-developed.   Eyes:      Pupils: Pupils are equal, round, and reactive to light.   Neck:      Thyroid: No thyromegaly.   Cardiovascular:      Rate and Rhythm: Normal rate.      Heart sounds: Normal heart sounds. No murmur heard.    No friction rub. No gallop.   Pulmonary:      Breath sounds: Normal breath sounds.   Abdominal:      General: Bowel sounds are normal.      Palpations: Abdomen is soft.   Musculoskeletal:         General: Normal range of motion.      Cervical back: Normal range of motion.   Lymphadenopathy:      Cervical: No cervical adenopathy.   Skin:     General: Skin is warm.       Findings: No rash.   Neurological:      Mental Status: She is alert and oriented to person, place, and time.      Cranial Nerves: No cranial nerve deficit.   Psychiatric:         Behavior: Behavior normal.          Laboratory:  CBC:  No results for input(s): WBC, RBC, HGB, HCT, PLT, MCV, MCH, MCHC in the last 2160 hours.  CMP:  No results for input(s): GLU, CALCIUM, ALBUMIN, PROT, NA, K, CO2, CL, BUN, ALKPHOS, ALT, AST, BILITOT in the last 2160 hours.    Invalid input(s): CREATININ  URINALYSIS:  No results for input(s): COLORU, CLARITYU, SPECGRAV, PHUR, PROTEINUA, GLUCOSEU, BILIRUBINCON, BLOODU, WBCU, RBCU, BACTERIA, MUCUS, NITRITE, LEUKOCYTESUR, UROBILINOGEN, HYALINECASTS in the last 2160 hours.   LIPIDS:  No results for input(s): TSH, HDL, CHOL, TRIG, LDLCALC, CHOLHDL, NONHDLCHOL, TOTALCHOLEST in the last 2160 hours.  TSH:  No results for input(s): TSH in the last 2160 hours.  A1C:  No results for input(s): HGBA1C in the last 2160 hours.    Radiology:        Assessment:     Michelle Izaguirre is a 36 y.o.female with:    Plantar fasciitis of right foot          Plan:     Problem List Items Addressed This Visit        Orthopedic    Plantar fasciitis of right foot - Primary    Overview     Discussed with patient need for arch support  Will provide patent information   Ibuprofen 800 mg po tid   If no relief after week will refer to podiatry                 As above, continue current medications and maintain follow up with specialists.  Return to clinic as scheduyled  months.      Frederick W Dantagnan [facility] [place]

## 2022-07-12 ENCOUNTER — PATIENT MESSAGE (OUTPATIENT)
Dept: INTERNAL MEDICINE | Facility: CLINIC | Age: 37
End: 2022-07-12
Payer: COMMERCIAL

## 2022-07-12 RX ORDER — IBUPROFEN 800 MG/1
800 TABLET ORAL
Qty: 30 TABLET | Refills: 1 | Status: SHIPPED | OUTPATIENT
Start: 2022-07-12 | End: 2024-03-10

## 2022-08-09 DIAGNOSIS — N83.202 UNSPECIFIED OVARIAN CYST, LEFT SIDE: Primary | ICD-10-CM

## 2022-08-10 ENCOUNTER — HOSPITAL ENCOUNTER (OUTPATIENT)
Dept: RADIOLOGY | Facility: HOSPITAL | Age: 37
Discharge: HOME | End: 2022-08-10
Attending: OBSTETRICS & GYNECOLOGY
Payer: COMMERCIAL

## 2022-08-10 DIAGNOSIS — N83.202 UNSPECIFIED OVARIAN CYST, LEFT SIDE: ICD-10-CM

## 2022-08-10 PROCEDURE — 72197 MRI PELVIS W/O & W/DYE: CPT | Mod: TC

## 2022-08-10 PROCEDURE — A9585 GADOBUTROL INJECTION: HCPCS | Performed by: OBSTETRICS & GYNECOLOGY

## 2022-08-10 PROCEDURE — 72197 MRI PELVIS W/O & W/DYE: CPT | Mod: 26,,, | Performed by: RADIOLOGY

## 2022-08-10 PROCEDURE — 72197 MRI PELVIS W WO CONTRAST: ICD-10-PCS | Mod: 26,,, | Performed by: RADIOLOGY

## 2022-08-10 PROCEDURE — 25500020 PHARM REV CODE 255: Performed by: OBSTETRICS & GYNECOLOGY

## 2022-08-10 RX ORDER — GADOBUTROL 604.72 MG/ML
10 INJECTION INTRAVENOUS
Status: COMPLETED | OUTPATIENT
Start: 2022-08-10 | End: 2022-08-10

## 2022-08-10 RX ADMIN — GADOBUTROL 10 ML: 604.72 INJECTION INTRAVENOUS at 06:08

## 2022-08-28 ENCOUNTER — PATIENT MESSAGE (OUTPATIENT)
Dept: INTERNAL MEDICINE | Facility: CLINIC | Age: 37
End: 2022-08-28
Payer: COMMERCIAL

## 2022-08-29 DIAGNOSIS — M72.2 PLANTAR FASCIITIS OF RIGHT FOOT: Primary | ICD-10-CM

## 2022-08-31 DIAGNOSIS — I10 BENIGN HYPERTENSION: ICD-10-CM

## 2022-09-06 ENCOUNTER — TELEPHONE (OUTPATIENT)
Dept: ORTHOPEDICS | Facility: CLINIC | Age: 37
End: 2022-09-06
Payer: COMMERCIAL

## 2022-09-06 DIAGNOSIS — R52 PAIN: Primary | ICD-10-CM

## 2022-09-07 ENCOUNTER — HOSPITAL ENCOUNTER (OUTPATIENT)
Dept: RADIOLOGY | Facility: HOSPITAL | Age: 37
Discharge: HOME | End: 2022-09-07
Attending: PHYSICIAN ASSISTANT
Payer: COMMERCIAL

## 2022-09-07 ENCOUNTER — OFFICE VISIT (OUTPATIENT)
Dept: ORTHOPEDICS | Facility: CLINIC | Age: 37
End: 2022-09-07
Payer: COMMERCIAL

## 2022-09-07 VITALS
DIASTOLIC BLOOD PRESSURE: 90 MMHG | WEIGHT: 272.69 LBS | SYSTOLIC BLOOD PRESSURE: 137 MMHG | HEART RATE: 83 BPM | BODY MASS INDEX: 42.71 KG/M2

## 2022-09-07 DIAGNOSIS — M67.01 ACHILLES TENDON CONTRACTURE, RIGHT: Primary | ICD-10-CM

## 2022-09-07 DIAGNOSIS — M72.2 PLANTAR FASCIITIS OF RIGHT FOOT: ICD-10-CM

## 2022-09-07 DIAGNOSIS — R52 PAIN: ICD-10-CM

## 2022-09-07 PROCEDURE — 3080F PR MOST RECENT DIASTOLIC BLOOD PRESSURE >= 90 MM HG: ICD-10-PCS | Mod: CPTII,S$GLB,, | Performed by: PHYSICIAN ASSISTANT

## 2022-09-07 PROCEDURE — 3075F PR MOST RECENT SYSTOLIC BLOOD PRESS GE 130-139MM HG: ICD-10-PCS | Mod: CPTII,S$GLB,, | Performed by: PHYSICIAN ASSISTANT

## 2022-09-07 PROCEDURE — 99203 OFFICE O/P NEW LOW 30 MIN: CPT | Mod: S$GLB,,, | Performed by: PHYSICIAN ASSISTANT

## 2022-09-07 PROCEDURE — 99999 PR PBB SHADOW E&M-EST. PATIENT-LVL IV: CPT | Mod: PBBFAC,,, | Performed by: PHYSICIAN ASSISTANT

## 2022-09-07 PROCEDURE — 99999 PR PBB SHADOW E&M-EST. PATIENT-LVL IV: ICD-10-PCS | Mod: PBBFAC,,, | Performed by: PHYSICIAN ASSISTANT

## 2022-09-07 PROCEDURE — 3008F PR BODY MASS INDEX (BMI) DOCUMENTED: ICD-10-PCS | Mod: CPTII,S$GLB,, | Performed by: PHYSICIAN ASSISTANT

## 2022-09-07 PROCEDURE — 73630 X-RAY EXAM OF FOOT: CPT | Mod: 26,RT,, | Performed by: STUDENT IN AN ORGANIZED HEALTH CARE EDUCATION/TRAINING PROGRAM

## 2022-09-07 PROCEDURE — 73630 X-RAY EXAM OF FOOT: CPT | Mod: TC,FY,RT

## 2022-09-07 PROCEDURE — 3080F DIAST BP >= 90 MM HG: CPT | Mod: CPTII,S$GLB,, | Performed by: PHYSICIAN ASSISTANT

## 2022-09-07 PROCEDURE — 1159F MED LIST DOCD IN RCRD: CPT | Mod: CPTII,S$GLB,, | Performed by: PHYSICIAN ASSISTANT

## 2022-09-07 PROCEDURE — 1159F PR MEDICATION LIST DOCUMENTED IN MEDICAL RECORD: ICD-10-PCS | Mod: CPTII,S$GLB,, | Performed by: PHYSICIAN ASSISTANT

## 2022-09-07 PROCEDURE — 4010F ACE/ARB THERAPY RXD/TAKEN: CPT | Mod: CPTII,S$GLB,, | Performed by: PHYSICIAN ASSISTANT

## 2022-09-07 PROCEDURE — 73630 XR FOOT COMPLETE 3 VIEW RIGHT: ICD-10-PCS | Mod: 26,RT,, | Performed by: STUDENT IN AN ORGANIZED HEALTH CARE EDUCATION/TRAINING PROGRAM

## 2022-09-07 PROCEDURE — 3008F BODY MASS INDEX DOCD: CPT | Mod: CPTII,S$GLB,, | Performed by: PHYSICIAN ASSISTANT

## 2022-09-07 PROCEDURE — 1160F PR REVIEW ALL MEDS BY PRESCRIBER/CLIN PHARMACIST DOCUMENTED: ICD-10-PCS | Mod: CPTII,S$GLB,, | Performed by: PHYSICIAN ASSISTANT

## 2022-09-07 PROCEDURE — 99203 PR OFFICE/OUTPT VISIT, NEW, LEVL III, 30-44 MIN: ICD-10-PCS | Mod: S$GLB,,, | Performed by: PHYSICIAN ASSISTANT

## 2022-09-07 PROCEDURE — 3075F SYST BP GE 130 - 139MM HG: CPT | Mod: CPTII,S$GLB,, | Performed by: PHYSICIAN ASSISTANT

## 2022-09-07 PROCEDURE — 4010F PR ACE/ARB THEARPY RXD/TAKEN: ICD-10-PCS | Mod: CPTII,S$GLB,, | Performed by: PHYSICIAN ASSISTANT

## 2022-09-07 PROCEDURE — 1160F RVW MEDS BY RX/DR IN RCRD: CPT | Mod: CPTII,S$GLB,, | Performed by: PHYSICIAN ASSISTANT

## 2022-09-07 RX ORDER — METFORMIN HYDROCHLORIDE 500 MG/1
TABLET, EXTENDED RELEASE ORAL
COMMUNITY
Start: 2022-07-28 | End: 2024-01-11

## 2022-09-07 RX ORDER — PROGESTERONE 200 MG/1
200 CAPSULE ORAL NIGHTLY
COMMUNITY
Start: 2022-07-19 | End: 2023-04-13

## 2022-09-07 NOTE — PROGRESS NOTES
Subjective:      Patient ID: Michelle Izaguirre is a 36 y.o. female.    Chief Complaint: Pain of the Right Foot      36-year-old female presents with 1 year history of right foot pain.  Denies injury.  Pain is in the plantar aspect of her foot.  Worse with 1st step getting out of bed in the morning or with 1st steps after prolonged sitting.  She has tried inserts, shoe change, ibuprofen for pain.  This helps minimally.  She denies instability or paresthesias in the foot.      Review of Systems   Constitutional: Negative for chills and fever.   Cardiovascular:  Negative for chest pain.   Respiratory:  Negative for cough.    Hematologic/Lymphatic: Does not bruise/bleed easily.   Skin:  Negative for poor wound healing and rash.   Musculoskeletal:  Positive for joint pain, myalgias and stiffness.   Gastrointestinal:  Negative for abdominal pain.   Genitourinary:  Negative for bladder incontinence.   Neurological:  Negative for dizziness, loss of balance and weakness.   Psychiatric/Behavioral:  Negative for altered mental status.      Review of patient's allergies indicates:   Allergen Reactions    Erythromycin Rash        Current Outpatient Medications   Medication Sig Dispense Refill    albuterol (PROAIR HFA) 90 mcg/actuation inhaler Inhale 2 puffs into the lungs every 6 (six) hours as needed for Wheezing. Rescue 18 g 2    cetirizine (ZYRTEC) 10 MG tablet Take 10 mg by mouth once daily.      diclofenac sodium (VOLTAREN) 1 % Gel 2 g.      FLOVENT HFA 44 mcg/actuation inhaler INL 2 PFS PO BID 10.6 g 3    levalbuterol (XOPENEX) 1.25 mg/3 mL nebulizer solution 3 mLs.      lisinopriL 10 MG tablet Take 1 tablet (10 mg total) by mouth once daily. 90 tablet 3    metFORMIN (GLUCOPHAGE-XR) 500 MG ER 24hr tablet       mometasone 0.1% (ELOCON) 0.1 % cream Use daily prn for rash 50 g 3    progesterone (PROMETRIUM) 200 MG capsule Take 200 mg by mouth every evening.      triamcinolone acetonide 0.1% (KENALOG) 0.1 % ointment Apply  topically 2 (two) times daily.      venlafaxine (EFFEXOR-XR) 75 MG 24 hr capsule Take 1 capsule (75 mg total) by mouth once daily. 30 capsule 5    ibuprofen (ADVIL,MOTRIN) 800 MG tablet Take 1 tablet (800 mg total) by mouth every meal as needed for Pain. (Patient not taking: Reported on 9/7/2022) 30 tablet 1     No current facility-administered medications for this visit.        The patient's relevant past medical, surgical, and social history was reviewed in Epic.       Objective:      VITAL SIGNS: BP (!) 137/90   Pulse 83   Wt 123.7 kg (272 lb 11.3 oz)   BMI 42.71 kg/m²     General    Nursing note and vitals reviewed.  Constitutional: She is oriented to person, place, and time. She appears well-developed and well-nourished.   Neurological: She is alert and oriented to person, place, and time.         Right Ankle/Foot Exam     Tenderness   The patient is tender to palpation of the plantar arch.    Range of Motion   Ankle Joint   Dorsiflexion:  0   Plantar flexion:  normal   Subtalar Joint   Inversion:  normal   Eversion:  normal     Tests   Anterior drawer: negative  Varus tilt: negative    Other   Sensation: normal    Comments:  Gastroc contracture noted with dorisflexion barely to neutral (0) or-1.         Muscle Strength   Right Lower Extremity   Anterior tibial:  5/5   Posterior tibial:  5/5   Gastrocsoleus:  5/5   Peroneal muscle:  5/5     Vascular Exam     Right Pulses  Dorsalis Pedis:      2+  Posterior Tibial:      1+       X-Ray Foot Complete Right  Narrative: EXAMINATION:  XR FOOT COMPLETE 3 VIEW RIGHT    CLINICAL HISTORY:  . Pain, unspecified    TECHNIQUE:  AP, lateral, and oblique views of the right foot were performed.    COMPARISON:  None    FINDINGS:  No evidence of acute fracture, dislocation, or osseous destruction.  Joint spaces appear maintained.  Minimal plantar calcaneal spur and Achilles enthesophyte.  Soft tissues are unremarkable.  Impression: As above.    Electronically signed  by: Misha Cabello  Date:    09/07/2022  Time:    16:02    I have reviewed the above radiograph and agree with the findings stated by the radiologist.         Assessment:       1. Achilles tendon contracture, right    2. Plantar fasciitis of right foot          Plan:         Michelle was seen today for pain.    Diagnoses and all orders for this visit:    Achilles tendon contracture, right  -     Ambulatory referral/consult to Physical/Occupational Therapy; Future    Plantar fasciitis of right foot  -     Ambulatory referral/consult to Orthopedics  -     Ambulatory referral/consult to Physical/Occupational Therapy; Future  Right foot plantar fasciitis with gastroc contracture.  Explained the natural history of plantar fasciitis to the patient.  Usually this is improved with a course of formal physical therapy for gastric strengthening.  I will refer her to [facility] in [place] for this.  Explained to patient if no relief from formal physical therapy she may need a follow-up with a podiatrist.    Diagnoses and plan discussed with the patient, as well as the expected course and duration of his symptoms.  All questions and concerns were addressed prior to the end of the visit.   Instructed patient to call office if they have any future questions/concerns or to schedule apt. Patient will return to see me if symptoms worsen or fail to improve    Note dictated with voice recognition software, please excuse any grammatical errors.        Isha Oswald PA-C   09/07/2022

## 2022-09-23 ENCOUNTER — CLINICAL SUPPORT (OUTPATIENT)
Dept: OTHER | Facility: CLINIC | Age: 37
End: 2022-09-23
Payer: COMMERCIAL

## 2022-09-23 DIAGNOSIS — Z00.8 ENCOUNTER FOR OTHER GENERAL EXAMINATION: ICD-10-CM

## 2022-10-24 LAB
HDLC SERPL-MCNC: 48 MG/DL
POC CHOLESTEROL, LDL (DOCK): 167 MG/DL
POC CHOLESTEROL, TOTAL: 233 MG/DL
POC GLUCOSE, FASTING: 80 MG/DL (ref 60–110)
TRIGL SERPL-MCNC: 103 MG/DL

## 2022-10-29 VITALS
WEIGHT: 270.38 LBS | SYSTOLIC BLOOD PRESSURE: 124 MMHG | BODY MASS INDEX: 42.44 KG/M2 | DIASTOLIC BLOOD PRESSURE: 78 MMHG | HEIGHT: 67 IN

## 2022-11-26 NOTE — TELEPHONE ENCOUNTER
Care Due:                  Date            Visit Type   Department     Provider  --------------------------------------------------------------------------------                                MYCHART                              FOLLOWUP/OF  [facility] PRIMARY  Last Visit: 07-      FICE VISIT   LINDA Aguilar  Next Visit: None Scheduled  None         None Found                                                            Last  Test          Frequency    Reason                     Performed    Due Date  --------------------------------------------------------------------------------    CMP.........  12 months..  lisinopriL, venlafaxine..  Not Found    Overdue    [facility] Embedded Care Gaps. Reference number: [id number]. 11/26/2022   8:06:29 AM CST

## 2022-11-27 NOTE — TELEPHONE ENCOUNTER
Refill Routing Note   Medication(s) are not appropriate for processing by [facility] for the following reason(s):      - Required laboratory values are outdated    [facility] action(s):  Defer Medication-related problems identified: Requires labs     Medication Therapy Plan: CMP due  Medication reconciliation completed: No     Appointments  past 12m or future 3m with PCP    Date Provider   Last Visit   7/7/2022 Paul Aguilar MD   Next Visit   Visit date not found Paul Aguilar MD   ED visits in past 90 days: 0        Note composed:10:54 AM 11/27/2022

## 2022-11-28 RX ORDER — VENLAFAXINE HYDROCHLORIDE 75 MG/1
75 CAPSULE, EXTENDED RELEASE ORAL DAILY
Qty: 30 CAPSULE | Refills: 5 | Status: SHIPPED | OUTPATIENT
Start: 2022-11-28 | End: 2023-10-07 | Stop reason: SDUPTHER

## 2022-12-12 ENCOUNTER — OFFICE VISIT (OUTPATIENT)
Dept: INTERNAL MEDICINE | Facility: CLINIC | Age: 37
End: 2022-12-12
Payer: COMMERCIAL

## 2022-12-12 VITALS
HEART RATE: 78 BPM | DIASTOLIC BLOOD PRESSURE: 98 MMHG | TEMPERATURE: 99 F | BODY MASS INDEX: 43.53 KG/M2 | WEIGHT: 277.31 LBS | SYSTOLIC BLOOD PRESSURE: 140 MMHG | HEIGHT: 67 IN | RESPIRATION RATE: 18 BRPM | OXYGEN SATURATION: 98 %

## 2022-12-12 DIAGNOSIS — R73.02 IMPAIRED GLUCOSE TOLERANCE: ICD-10-CM

## 2022-12-12 DIAGNOSIS — B35.1 ONYCHOMYCOSIS: Primary | ICD-10-CM

## 2022-12-12 PROCEDURE — 99999 PR PBB SHADOW E&M-EST. PATIENT-LVL IV: CPT | Mod: PBBFAC,,, | Performed by: INTERNAL MEDICINE

## 2022-12-12 PROCEDURE — 99999 PR PBB SHADOW E&M-EST. PATIENT-LVL IV: ICD-10-PCS | Mod: PBBFAC,,, | Performed by: INTERNAL MEDICINE

## 2022-12-12 PROCEDURE — 4010F PR ACE/ARB THEARPY RXD/TAKEN: ICD-10-PCS | Mod: CPTII,S$GLB,, | Performed by: INTERNAL MEDICINE

## 2022-12-12 PROCEDURE — 1160F RVW MEDS BY RX/DR IN RCRD: CPT | Mod: CPTII,S$GLB,, | Performed by: INTERNAL MEDICINE

## 2022-12-12 PROCEDURE — 99214 OFFICE O/P EST MOD 30 MIN: CPT | Mod: S$GLB,,, | Performed by: INTERNAL MEDICINE

## 2022-12-12 PROCEDURE — 99214 PR OFFICE/OUTPT VISIT, EST, LEVL IV, 30-39 MIN: ICD-10-PCS | Mod: S$GLB,,, | Performed by: INTERNAL MEDICINE

## 2022-12-12 PROCEDURE — 3080F PR MOST RECENT DIASTOLIC BLOOD PRESSURE >= 90 MM HG: ICD-10-PCS | Mod: CPTII,S$GLB,, | Performed by: INTERNAL MEDICINE

## 2022-12-12 PROCEDURE — 3080F DIAST BP >= 90 MM HG: CPT | Mod: CPTII,S$GLB,, | Performed by: INTERNAL MEDICINE

## 2022-12-12 PROCEDURE — 3077F PR MOST RECENT SYSTOLIC BLOOD PRESSURE >= 140 MM HG: ICD-10-PCS | Mod: CPTII,S$GLB,, | Performed by: INTERNAL MEDICINE

## 2022-12-12 PROCEDURE — 1160F PR REVIEW ALL MEDS BY PRESCRIBER/CLIN PHARMACIST DOCUMENTED: ICD-10-PCS | Mod: CPTII,S$GLB,, | Performed by: INTERNAL MEDICINE

## 2022-12-12 PROCEDURE — 3008F PR BODY MASS INDEX (BMI) DOCUMENTED: ICD-10-PCS | Mod: CPTII,S$GLB,, | Performed by: INTERNAL MEDICINE

## 2022-12-12 PROCEDURE — 1159F MED LIST DOCD IN RCRD: CPT | Mod: CPTII,S$GLB,, | Performed by: INTERNAL MEDICINE

## 2022-12-12 PROCEDURE — 1159F PR MEDICATION LIST DOCUMENTED IN MEDICAL RECORD: ICD-10-PCS | Mod: CPTII,S$GLB,, | Performed by: INTERNAL MEDICINE

## 2022-12-12 PROCEDURE — 4010F ACE/ARB THERAPY RXD/TAKEN: CPT | Mod: CPTII,S$GLB,, | Performed by: INTERNAL MEDICINE

## 2022-12-12 PROCEDURE — 3077F SYST BP >= 140 MM HG: CPT | Mod: CPTII,S$GLB,, | Performed by: INTERNAL MEDICINE

## 2022-12-12 PROCEDURE — 3008F BODY MASS INDEX DOCD: CPT | Mod: CPTII,S$GLB,, | Performed by: INTERNAL MEDICINE

## 2022-12-12 RX ORDER — CICLOPIROX 80 MG/ML
SOLUTION TOPICAL NIGHTLY
Qty: 6.6 ML | Refills: 0 | Status: SHIPPED | OUTPATIENT
Start: 2022-12-12 | End: 2023-07-03

## 2022-12-12 NOTE — PROGRESS NOTES
[facility] Note    Chief Complaint      Chief Complaint   Patient presents with    Nail Problem       History of Present Illness      Michelle Izaguirre is a 37 y.o. female who presents today for   Chief Complaint   Patient presents with    Nail Problem   .  Patient comes to appointment here for visit related to above . She has fungus on one nail (great toe of right foot ) . She is currently seeing fertility specialist . She has been getting multiple labs with them . Apparently at some point she was placed on metformin . Needs repeat labs . She is trying hard with weight loss she is now 277 down from over 300 lbs     Problem List Items Addressed This Visit          Derm    Onychomycosis - Primary    Overview     Will try penlac topical.            Endocrine    Impaired glucose tolerance    Overview     Repeat fasting blood glucose and a1c               Past Medical History:  Past Medical History:   Diagnosis Date    Allergy     Asthma     Eczema     Hiatal hernia 2009    Hypertension        Past Surgical History:  Past Surgical History:   Procedure Laterality Date    tonsilectomy      TONSILLECTOMY         Family History:  family history includes Allergies in her father and mother; Asthma in her father and mother; Colon cancer in her maternal grandfather; Eczema in her mother; Heart disease in her maternal grandmother; Heart failure in her mother and paternal grandmother; Hypertension in her father and mother; Leukemia in her maternal grandfather; Rheum arthritis in her mother.    Social History:  Social History     Socioeconomic History    Marital status:    Tobacco Use    Smoking status: Former     Packs/day: 0.50     Types: Cigarettes     Quit date: 4/10/2018     Years since quittin.6    Smokeless tobacco: Never   Substance and Sexual Activity    Alcohol use: Not Currently    Drug use: No    Sexual activity: Yes     Partners: Male     Birth control/protection: None    Social History Narrative    Tobacco: Intermittent use; once per month has 7 cigarettes; Used ages 20 yo-34 yo; max use 0.5 ppd    EtOH: Twice per week, 2 drinks at one time    Drug: None    Employment: Teacher ()     Education: Bachelor's     Lives with  and 8 yo step-son      Social Determinants of Health     Financial Resource Strain: Low Risk     Difficulty of Paying Living Expenses: Not hard at all   Food Insecurity: No Food Insecurity    Worried About Running Out of Food in the Last Year: Never true    Ran Out of Food in the Last Year: Never true   Transportation Needs: No Transportation Needs    Lack of Transportation (Medical): No    Lack of Transportation (Non-Medical): No   Physical Activity: Insufficiently Active    Days of Exercise per Week: 1 day    Minutes of Exercise per Session: 10 min   Stress: Stress Concern Present    Feeling of Stress : To some extent   Social Connections: Unknown    Frequency of Communication with Friends and Family: More than three times a week    Frequency of Social Gatherings with Friends and Family: Once a week    Active Member of Clubs or Organizations: No    Attends Club or Organization Meetings: Patient refused    Marital Status: Living with partner   Housing Stability: Low Risk     Unable to Pay for Housing in the Last Year: No    Number of Places Lived in the Last Year: 2    Unstable Housing in the Last Year: No       Review of Systems:   Review of Systems   Constitutional:  Negative for fever and weight loss.   HENT:  Negative for congestion, hearing loss and sore throat.    Eyes:  Negative for blurred vision.   Respiratory:  Negative for cough and shortness of breath.    Cardiovascular:  Negative for chest pain, palpitations, claudication and leg swelling.   Gastrointestinal:  Negative for abdominal pain, constipation, diarrhea and heartburn.   Genitourinary:  Negative for dysuria.   Musculoskeletal:  Negative for back pain and myalgias.   Skin:   Negative for rash.   Neurological:  Negative for focal weakness and headaches.   Psychiatric/Behavioral:  Negative for depression and suicidal ideas. The patient is not nervous/anxious.        Medications:  Outpatient Encounter Medications as of 12/12/2022   Medication Sig Dispense Refill    cetirizine (ZYRTEC) 10 MG tablet Take 10 mg by mouth once daily.      diclofenac sodium (VOLTAREN) 1 % Gel 2 g.      FLOVENT HFA 44 mcg/actuation inhaler INL 2 PFS PO BID 10.6 g 3    ibuprofen (ADVIL,MOTRIN) 800 MG tablet Take 1 tablet (800 mg total) by mouth every meal as needed for Pain. 30 tablet 1    levalbuterol (XOPENEX) 1.25 mg/3 mL nebulizer solution 3 mLs.      lisinopriL 10 MG tablet Take 1 tablet (10 mg total) by mouth once daily. 90 tablet 3    metFORMIN (GLUCOPHAGE-XR) 500 MG ER 24hr tablet       mometasone 0.1% (ELOCON) 0.1 % cream Use daily prn for rash 50 g 3    progesterone (PROMETRIUM) 200 MG capsule Take 200 mg by mouth every evening.      triamcinolone acetonide 0.1% (KENALOG) 0.1 % ointment Apply topically 2 (two) times daily.      venlafaxine (EFFEXOR-XR) 75 MG 24 hr capsule Take 1 capsule (75 mg total) by mouth once daily. 30 capsule 5    albuterol (PROAIR HFA) 90 mcg/actuation inhaler Inhale 2 puffs into the lungs every 6 (six) hours as needed for Wheezing. Rescue 18 g 2    ciclopirox (PENLAC) 8 % Soln Apply topically nightly. 6.6 mL 0     No facility-administered encounter medications on file as of 12/12/2022.        Allergies:  Review of patient's allergies indicates:   Allergen Reactions    Erythromycin Rash         Physical Exam         Vitals:    12/12/22 0842   BP: (!) 140/98   Pulse: 78   Resp: 18   Temp: 98.7 °F (37.1 °C)         Physical Exam  Constitutional:       Appearance: She is well-developed.   Eyes:      Pupils: Pupils are equal, round, and reactive to light.   Neck:      Thyroid: No thyromegaly.   Cardiovascular:      Rate and Rhythm: Normal rate.      Heart sounds: Normal heart sounds.  No murmur heard.    No friction rub. No gallop.   Pulmonary:      Breath sounds: Normal breath sounds.   Abdominal:      General: Bowel sounds are normal.      Palpations: Abdomen is soft.   Musculoskeletal:         General: Normal range of motion.      Cervical back: Normal range of motion.   Lymphadenopathy:      Cervical: No cervical adenopathy.   Skin:     General: Skin is warm.      Findings: No rash.   Neurological:      Mental Status: She is alert and oriented to person, place, and time.      Cranial Nerves: No cranial nerve deficit.   Psychiatric:         Behavior: Behavior normal.        Laboratory:  CBC:  No results for input(s): WBC, RBC, HGB, HCT, PLT, MCV, MCH, MCHC in the last 2160 hours.  CMP:  No results for input(s): GLU, CALCIUM, ALBUMIN, PROT, NA, K, CO2, CL, BUN, ALKPHOS, ALT, AST, BILITOT in the last 2160 hours.    Invalid input(s): CREATININ  URINALYSIS:  No results for input(s): COLORU, CLARITYU, SPECGRAV, PHUR, PROTEINUA, GLUCOSEU, BILIRUBINCON, BLOODU, WBCU, RBCU, BACTERIA, MUCUS, NITRITE, LEUKOCYTESUR, UROBILINOGEN, HYALINECASTS in the last 2160 hours.   LIPIDS:  No results for input(s): TSH, HDL, CHOL, TRIG, LDLCALC, CHOLHDL, NONHDLCHOL, TOTALCHOLEST in the last 2160 hours.  TSH:  No results for input(s): TSH in the last 2160 hours.  A1C:  No results for input(s): HGBA1C in the last 2160 hours.    Radiology:        Assessment:     Michelle Izaguirre is a 37 y.o.female with:    Onychomycosis  -     ciclopirox (PENLAC) 8 % Soln; Apply topically nightly.  Dispense: 6.6 mL; Refill: 0    Impaired glucose tolerance  -     Comprehensive Metabolic Panel; Future; Expected date: 12/12/2022  -     Hemoglobin A1C; Future; Expected date: 12/12/2022          Plan:     Problem List Items Addressed This Visit          Derm    Onychomycosis - Primary    Overview     Will try penlac topical.            Endocrine    Impaired glucose tolerance    Overview     Repeat fasting blood glucose and a1c              As above, continue current medications and maintain follow up with specialists.  Return to clinic in 6 months.      Frederick W Dantagnan [facility] [place]

## 2023-01-23 ENCOUNTER — PATIENT MESSAGE (OUTPATIENT)
Dept: INTERNAL MEDICINE | Facility: CLINIC | Age: 38
End: 2023-01-23
Payer: COMMERCIAL

## 2023-01-24 ENCOUNTER — PATIENT MESSAGE (OUTPATIENT)
Dept: OBSTETRICS AND GYNECOLOGY | Facility: CLINIC | Age: 38
End: 2023-01-24
Payer: COMMERCIAL

## 2023-01-30 ENCOUNTER — PATIENT MESSAGE (OUTPATIENT)
Dept: INTERNAL MEDICINE | Facility: CLINIC | Age: 38
End: 2023-01-30
Payer: COMMERCIAL

## 2023-01-30 ENCOUNTER — PATIENT MESSAGE (OUTPATIENT)
Dept: OBSTETRICS AND GYNECOLOGY | Facility: CLINIC | Age: 38
End: 2023-01-30
Payer: COMMERCIAL

## 2023-01-30 DIAGNOSIS — I10 BENIGN ESSENTIAL HYPERTENSION: Primary | ICD-10-CM

## 2023-01-30 RX ORDER — NIFEDIPINE 30 MG/1
30 TABLET, EXTENDED RELEASE ORAL DAILY
Qty: 30 TABLET | Refills: 0 | Status: SHIPPED | OUTPATIENT
Start: 2023-01-30 | End: 2023-07-03

## 2023-01-30 RX ORDER — LABETALOL 200 MG/1
200 TABLET, FILM COATED ORAL 2 TIMES DAILY
Qty: 60 TABLET | Refills: 11 | Status: SHIPPED | OUTPATIENT
Start: 2023-01-30 | End: 2024-01-24 | Stop reason: ALTCHOICE

## 2023-01-30 RX ORDER — LABETALOL 100 MG/1
100 TABLET, FILM COATED ORAL 2 TIMES DAILY
Qty: 60 TABLET | Refills: 11 | Status: SHIPPED | OUTPATIENT
Start: 2023-01-30 | End: 2023-01-30

## 2023-04-13 ENCOUNTER — OFFICE VISIT (OUTPATIENT)
Dept: OBSTETRICS AND GYNECOLOGY | Facility: CLINIC | Age: 38
End: 2023-04-13
Payer: COMMERCIAL

## 2023-04-13 VITALS
BODY MASS INDEX: 45.04 KG/M2 | DIASTOLIC BLOOD PRESSURE: 81 MMHG | WEIGHT: 287.63 LBS | SYSTOLIC BLOOD PRESSURE: 132 MMHG

## 2023-04-13 DIAGNOSIS — Z12.4 SCREENING FOR CERVICAL CANCER: ICD-10-CM

## 2023-04-13 DIAGNOSIS — Z01.419 WELL WOMAN EXAM WITH ROUTINE GYNECOLOGICAL EXAM: Primary | ICD-10-CM

## 2023-04-13 PROCEDURE — 3008F PR BODY MASS INDEX (BMI) DOCUMENTED: ICD-10-PCS | Mod: CPTII,S$GLB,, | Performed by: OBSTETRICS & GYNECOLOGY

## 2023-04-13 PROCEDURE — 3075F SYST BP GE 130 - 139MM HG: CPT | Mod: CPTII,S$GLB,, | Performed by: OBSTETRICS & GYNECOLOGY

## 2023-04-13 PROCEDURE — 1160F RVW MEDS BY RX/DR IN RCRD: CPT | Mod: CPTII,S$GLB,, | Performed by: OBSTETRICS & GYNECOLOGY

## 2023-04-13 PROCEDURE — 1160F PR REVIEW ALL MEDS BY PRESCRIBER/CLIN PHARMACIST DOCUMENTED: ICD-10-PCS | Mod: CPTII,S$GLB,, | Performed by: OBSTETRICS & GYNECOLOGY

## 2023-04-13 PROCEDURE — 3079F DIAST BP 80-89 MM HG: CPT | Mod: CPTII,S$GLB,, | Performed by: OBSTETRICS & GYNECOLOGY

## 2023-04-13 PROCEDURE — 87624 HPV HI-RISK TYP POOLED RSLT: CPT | Performed by: OBSTETRICS & GYNECOLOGY

## 2023-04-13 PROCEDURE — 99395 PREV VISIT EST AGE 18-39: CPT | Mod: S$GLB,,, | Performed by: OBSTETRICS & GYNECOLOGY

## 2023-04-13 PROCEDURE — 1159F MED LIST DOCD IN RCRD: CPT | Mod: CPTII,S$GLB,, | Performed by: OBSTETRICS & GYNECOLOGY

## 2023-04-13 PROCEDURE — 1159F PR MEDICATION LIST DOCUMENTED IN MEDICAL RECORD: ICD-10-PCS | Mod: CPTII,S$GLB,, | Performed by: OBSTETRICS & GYNECOLOGY

## 2023-04-13 PROCEDURE — 99999 PR PBB SHADOW E&M-EST. PATIENT-LVL III: CPT | Mod: PBBFAC,,, | Performed by: OBSTETRICS & GYNECOLOGY

## 2023-04-13 PROCEDURE — 99395 PR PREVENTIVE VISIT,EST,18-39: ICD-10-PCS | Mod: S$GLB,,, | Performed by: OBSTETRICS & GYNECOLOGY

## 2023-04-13 PROCEDURE — 3075F PR MOST RECENT SYSTOLIC BLOOD PRESS GE 130-139MM HG: ICD-10-PCS | Mod: CPTII,S$GLB,, | Performed by: OBSTETRICS & GYNECOLOGY

## 2023-04-13 PROCEDURE — 3079F PR MOST RECENT DIASTOLIC BLOOD PRESSURE 80-89 MM HG: ICD-10-PCS | Mod: CPTII,S$GLB,, | Performed by: OBSTETRICS & GYNECOLOGY

## 2023-04-13 PROCEDURE — 3008F BODY MASS INDEX DOCD: CPT | Mod: CPTII,S$GLB,, | Performed by: OBSTETRICS & GYNECOLOGY

## 2023-04-13 PROCEDURE — 88175 CYTOPATH C/V AUTO FLUID REDO: CPT | Performed by: OBSTETRICS & GYNECOLOGY

## 2023-04-13 PROCEDURE — 99999 PR PBB SHADOW E&M-EST. PATIENT-LVL III: ICD-10-PCS | Mod: PBBFAC,,, | Performed by: OBSTETRICS & GYNECOLOGY

## 2023-04-13 NOTE — PROGRESS NOTES
GYNECOLOGY OFFICE NOTE    Reason for visit: annual    HPI: Pt is a 37 y.o.  female  who presents for annual. Menarche: 11. Cycle: Interval-  Q month, Duration- 4-5 days, Flow- heavy (changing  super tampon + pad every 3-4 hours  ), denies dysmenorrhea. She is sexually active.  She uses no method for contraception-declines.  She does not desire STI screening. She denies vaginal discharge.  Last pap: 2020, denies hx of abnormal.     Past Medical History:   Diagnosis Date    Allergy     Asthma     Eczema     Hiatal hernia     Hypertension        Past Surgical History:   Procedure Laterality Date    LAPAROSCOPY  2022    endometriosis    TONSILLECTOMY         Family History   Problem Relation Age of Onset    Heart failure Paternal Grandmother     Heart disease Maternal Grandmother     Colon cancer Maternal Grandfather     Leukemia Maternal Grandfather     Hypertension Father     Asthma Father     Allergies Father     Rheum arthritis Mother     Asthma Mother     Heart failure Mother     Hypertension Mother     Allergies Mother     Eczema Mother     Breast cancer Neg Hx     Ovarian cancer Neg Hx     Allergic rhinitis Neg Hx     Angioedema Neg Hx     Atopy Neg Hx     Immunodeficiency Neg Hx     Rhinitis Neg Hx     Urticaria Neg Hx        Social History     Tobacco Use    Smoking status: Former     Packs/day: 0.50     Types: Cigarettes     Quit date: 4/10/2018     Years since quittin.0    Smokeless tobacco: Never   Substance Use Topics    Alcohol use: Not Currently    Drug use: No       OB History    Para Term  AB Living   1 0 0 0 1 0   SAB IAB Ectopic Multiple Live Births   1 0 0 0 0      # Outcome Date GA Lbr Johnny/2nd Weight Sex Delivery Anes PTL Lv   1 SAB 23               Current Outpatient Medications   Medication Sig    labetaloL (NORMODYNE) 200 MG tablet Take 1 tablet (200 mg total) by mouth 2 (two) times daily.    metFORMIN (GLUCOPHAGE-XR) 500 MG ER 24hr tablet      venlafaxine (EFFEXOR-XR) 75 MG 24 hr capsule Take 1 capsule (75 mg total) by mouth once daily.    albuterol (PROAIR HFA) 90 mcg/actuation inhaler Inhale 2 puffs into the lungs every 6 (six) hours as needed for Wheezing. Rescue    cetirizine (ZYRTEC) 10 MG tablet Take 10 mg by mouth once daily.    ciclopirox (PENLAC) 8 % Soln Apply topically nightly. (Patient not taking: Reported on 4/13/2023)    diclofenac sodium (VOLTAREN) 1 % Gel 2 g.    FLOVENT HFA 44 mcg/actuation inhaler INL 2 PFS PO BID (Patient not taking: Reported on 4/13/2023)    ibuprofen (ADVIL,MOTRIN) 800 MG tablet Take 1 tablet (800 mg total) by mouth every meal as needed for Pain. (Patient not taking: Reported on 4/13/2023)    levalbuterol (XOPENEX) 1.25 mg/3 mL nebulizer solution 3 mLs.    mometasone 0.1% (ELOCON) 0.1 % cream Use daily prn for rash    NIFEdipine (PROCARDIA-XL) 30 MG (OSM) 24 hr tablet Take 1 tablet (30 mg total) by mouth once daily. (Patient not taking: Reported on 4/13/2023)    triamcinolone acetonide 0.1% (KENALOG) 0.1 % ointment Apply topically 2 (two) times daily.     No current facility-administered medications for this visit.       Allergies: Erythromycin     /81   Wt 130.5 kg (287 lb 9.6 oz)   LMP 03/23/2023 (Approximate)   BMI 45.04 kg/m²     ROS:  GENERAL: Denies fever or chills.   SKIN: Denies rash or lesions.   HEAD: Denies head injury or headache.   CHEST: Denies chest pain or shortness of breath.   CARDIOVASCULAR: Denies palpitations or chest pain.   ABDOMEN: No constipation, diarrhea, nausea, vomiting or rectal bleeding.   URINARY: No dysuria, hematuria, or burning on urination.  REPRODUCTIVE: See HPI.   BREASTS: see HPI  NEUROLOGIC: Denies syncope or weakness.     Physical Exam:  GENERAL: alert, appears stated age and cooperative  NEUROLOGIC: orientated to person, place and time, normal mood and affect   CHEST: Normal respiratory effort  NECK: normal appearance  SKIN: no acne, hirsutism  BREAST EXAM: breasts  appear normal, no suspicious masses, no skin or nipple changes or axillary nodes  ABDOMEN: abdomen is soft without significant tenderness, masses  EXTERNAL GENITALIA:  normal general appearance  URETHRA: normal urethra, normal urethral meatus  VAGINA:  normal mucosa, no  lesions  CERVIX:  Normal  UTERUS:  mobile, non tender  ADNEXA: nontender    Diagnosis:  1. Well woman exam with routine gynecological exam    2. Screening for cervical cancer        Plan:   1. Annual  2. Pap/hpv    Orders Placed This Encounter    HPV High Risk Genotypes, PCR    Liquid-Based Pap Smear, Screening               Rama Plummer MD  OB/GYN

## 2023-04-19 LAB
HPV HR 12 DNA SPEC QL NAA+PROBE: NEGATIVE
HPV16 AG SPEC QL: NEGATIVE
HPV18 DNA SPEC QL NAA+PROBE: NEGATIVE

## 2023-04-21 LAB
FINAL PATHOLOGIC DIAGNOSIS: NORMAL
Lab: NORMAL

## 2023-06-23 ENCOUNTER — PATIENT MESSAGE (OUTPATIENT)
Dept: ADMINISTRATIVE | Facility: OTHER | Age: 38
End: 2023-06-23
Payer: COMMERCIAL

## 2023-06-28 ENCOUNTER — PATIENT MESSAGE (OUTPATIENT)
Dept: ORTHOPEDICS | Facility: CLINIC | Age: 38
End: 2023-06-28
Payer: COMMERCIAL

## 2023-06-28 DIAGNOSIS — M72.2 PLANTAR FASCIITIS OF RIGHT FOOT: Primary | ICD-10-CM

## 2023-06-28 DIAGNOSIS — M67.01 ACHILLES TENDON CONTRACTURE, RIGHT: ICD-10-CM

## 2023-07-05 DIAGNOSIS — M79.672 FOOT PAIN, LEFT: Primary | ICD-10-CM

## 2023-07-10 ENCOUNTER — OFFICE VISIT (OUTPATIENT)
Dept: ORTHOPEDICS | Facility: CLINIC | Age: 38
End: 2023-07-10
Payer: COMMERCIAL

## 2023-07-10 VITALS
HEART RATE: 83 BPM | HEIGHT: 67 IN | SYSTOLIC BLOOD PRESSURE: 156 MMHG | DIASTOLIC BLOOD PRESSURE: 93 MMHG | WEIGHT: 291.88 LBS | BODY MASS INDEX: 45.81 KG/M2

## 2023-07-10 DIAGNOSIS — M67.01 ACHILLES TENDON CONTRACTURE, BILATERAL: ICD-10-CM

## 2023-07-10 DIAGNOSIS — M72.2 PLANTAR FASCIITIS OF LEFT FOOT: Primary | ICD-10-CM

## 2023-07-10 DIAGNOSIS — M67.02 ACHILLES TENDON CONTRACTURE, BILATERAL: ICD-10-CM

## 2023-07-10 PROCEDURE — 3077F PR MOST RECENT SYSTOLIC BLOOD PRESSURE >= 140 MM HG: ICD-10-PCS | Mod: CPTII,S$GLB,, | Performed by: PHYSICIAN ASSISTANT

## 2023-07-10 PROCEDURE — 3077F SYST BP >= 140 MM HG: CPT | Mod: CPTII,S$GLB,, | Performed by: PHYSICIAN ASSISTANT

## 2023-07-10 PROCEDURE — 3008F BODY MASS INDEX DOCD: CPT | Mod: CPTII,S$GLB,, | Performed by: PHYSICIAN ASSISTANT

## 2023-07-10 PROCEDURE — 99213 PR OFFICE/OUTPT VISIT, EST, LEVL III, 20-29 MIN: ICD-10-PCS | Mod: S$GLB,,, | Performed by: PHYSICIAN ASSISTANT

## 2023-07-10 PROCEDURE — 1160F RVW MEDS BY RX/DR IN RCRD: CPT | Mod: CPTII,S$GLB,, | Performed by: PHYSICIAN ASSISTANT

## 2023-07-10 PROCEDURE — 99999 PR PBB SHADOW E&M-EST. PATIENT-LVL IV: ICD-10-PCS | Mod: PBBFAC,,, | Performed by: PHYSICIAN ASSISTANT

## 2023-07-10 PROCEDURE — 99999 PR PBB SHADOW E&M-EST. PATIENT-LVL IV: CPT | Mod: PBBFAC,,, | Performed by: PHYSICIAN ASSISTANT

## 2023-07-10 PROCEDURE — 3080F PR MOST RECENT DIASTOLIC BLOOD PRESSURE >= 90 MM HG: ICD-10-PCS | Mod: CPTII,S$GLB,, | Performed by: PHYSICIAN ASSISTANT

## 2023-07-10 PROCEDURE — 99213 OFFICE O/P EST LOW 20 MIN: CPT | Mod: S$GLB,,, | Performed by: PHYSICIAN ASSISTANT

## 2023-07-10 PROCEDURE — 1160F PR REVIEW ALL MEDS BY PRESCRIBER/CLIN PHARMACIST DOCUMENTED: ICD-10-PCS | Mod: CPTII,S$GLB,, | Performed by: PHYSICIAN ASSISTANT

## 2023-07-10 PROCEDURE — 3080F DIAST BP >= 90 MM HG: CPT | Mod: CPTII,S$GLB,, | Performed by: PHYSICIAN ASSISTANT

## 2023-07-10 PROCEDURE — 3008F PR BODY MASS INDEX (BMI) DOCUMENTED: ICD-10-PCS | Mod: CPTII,S$GLB,, | Performed by: PHYSICIAN ASSISTANT

## 2023-07-10 PROCEDURE — 1159F PR MEDICATION LIST DOCUMENTED IN MEDICAL RECORD: ICD-10-PCS | Mod: CPTII,S$GLB,, | Performed by: PHYSICIAN ASSISTANT

## 2023-07-10 PROCEDURE — 1159F MED LIST DOCD IN RCRD: CPT | Mod: CPTII,S$GLB,, | Performed by: PHYSICIAN ASSISTANT

## 2023-07-10 NOTE — PROGRESS NOTES
Subjective:      Patient ID: Michelle Izaguirre is a 37 y.o. female.    Chief Complaint: Pain of the Left Foot      38yo female presents with 2 month history of left heel pain.  No injury.  I have previously seen her for right foot plantar fasciitis.  I referred her to physical therapy but she did not completed due to financial issues.  Her pain is worse with ambulation.  Denies paresthesias.  He has been taking ibuprofen with minimal relief.  She does have therapy set up for next week.      Review of Systems   Constitutional: Negative for chills and fever.   Cardiovascular:  Negative for chest pain.   Respiratory:  Negative for cough.    Hematologic/Lymphatic: Does not bruise/bleed easily.   Skin:  Negative for poor wound healing and rash.   Musculoskeletal:  Positive for joint pain and stiffness.   Gastrointestinal:  Negative for abdominal pain.   Genitourinary:  Negative for bladder incontinence.   Neurological:  Negative for dizziness, loss of balance and weakness.   Psychiatric/Behavioral:  Negative for altered mental status.      Review of patient's allergies indicates:   Allergen Reactions    Erythromycin Rash        Current Outpatient Medications   Medication Sig Dispense Refill    cetirizine (ZYRTEC) 10 MG tablet Take 10 mg by mouth once daily.      diclofenac sodium (VOLTAREN) 1 % Gel 2 g.      FLOVENT HFA 44 mcg/actuation inhaler INL 2 PFS PO BID 10.6 g 3    ibuprofen (ADVIL,MOTRIN) 800 MG tablet Take 1 tablet (800 mg total) by mouth every meal as needed for Pain. 30 tablet 1    labetaloL (NORMODYNE) 200 MG tablet Take 1 tablet (200 mg total) by mouth 2 (two) times daily. 60 tablet 11    levalbuterol (XOPENEX) 1.25 mg/3 mL nebulizer solution 3 mLs.      metFORMIN (GLUCOPHAGE-XR) 500 MG ER 24hr tablet       mometasone 0.1% (ELOCON) 0.1 % cream Use daily prn for rash 50 g 3    triamcinolone acetonide 0.1% (KENALOG) 0.1 % ointment Apply topically 2 (two) times daily.      venlafaxine (EFFEXOR-XR) 75 MG 24  "hr capsule Take 1 capsule (75 mg total) by mouth once daily. 30 capsule 5    albuterol (PROAIR HFA) 90 mcg/actuation inhaler Inhale 2 puffs into the lungs every 6 (six) hours as needed for Wheezing. Rescue 18 g 2     No current facility-administered medications for this visit.        The patient's relevant past medical, surgical, and social history was reviewed in Epic.       Objective:      VITAL SIGNS: BP (!) 156/93   Pulse 83   Ht 5' 7" (1.702 m)   Wt 132.4 kg (291 lb 14.2 oz)   BMI 45.72 kg/m²     General    Nursing note and vitals reviewed.  Constitutional: She is oriented to person, place, and time. She appears well-developed and well-nourished.   Neurological: She is alert and oriented to person, place, and time.     General Musculoskeletal Exam   Gait: antalgic     Left Ankle/Foot Exam     Tenderness   The patient is tender to palpation of the plantar arch.    Range of Motion   Ankle Joint  Dorsiflexion:  0     Alignment   Midfoot Alignment: flexible planus    Tests   Anterior drawer: negative  Varus tilt: negative    Other   Sensation: normal      Muscle Strength   Left Lower Extremity   Anterior tibial:  5/5   Posterior tibial:  5/5   Gastrocsoleus:  5/5   Peroneal muscle:  5/5     Vascular Exam       Left Pulses  Dorsalis Pedis:      2+  Posterior Tibial:      1+             Assessment:       1. Plantar fasciitis of left foot    2. Achilles tendon contracture, bilateral          Plan:         Michelle was seen today for pain.    Diagnoses and all orders for this visit:    Plantar fasciitis of left foot  -     Ambulatory referral/consult to Physical/Occupational Therapy; Future    Achilles tendon contracture, bilateral  -     Ambulatory referral/consult to Physical/Occupational Therapy; Future    Left foot plantar fasciitis.  Gastroc contracture noted. She already has physical therapy scheduled next week.  Keep this appointment to start on Achilles and gastroc stretching, strengthening, proprioceptive " exercises.  If no relief from PT, she will need to follow up with Podiatry.    Diagnoses and plan discussed with the patient, as well as the expected course and duration of his symptoms.  All questions and concerns were addressed prior to the end of the visit.   Instructed patient to call office if they have any future questions/concerns or to schedule apt. Patient will return to see me if symptoms worsen or fail to improve    Note dictated with voice recognition software, please excuse any grammatical errors.        Isha Oswald PA-C   07/10/2023

## 2023-07-19 PROBLEM — R26.9 IMPAIRED GAIT: Status: ACTIVE | Noted: 2023-07-19

## 2023-07-19 PROBLEM — M25.672 DECREASED RANGE OF MOTION OF LEFT ANKLE: Status: ACTIVE | Noted: 2023-07-19

## 2023-08-04 ENCOUNTER — CLINICAL SUPPORT (OUTPATIENT)
Dept: OTHER | Facility: CLINIC | Age: 38
End: 2023-08-04

## 2023-08-04 DIAGNOSIS — Z00.8 ENCOUNTER FOR OTHER GENERAL EXAMINATION: ICD-10-CM

## 2023-08-05 VITALS
HEIGHT: 67 IN | DIASTOLIC BLOOD PRESSURE: 93 MMHG | BODY MASS INDEX: 45.2 KG/M2 | WEIGHT: 288 LBS | SYSTOLIC BLOOD PRESSURE: 167 MMHG

## 2023-08-05 LAB
HDLC SERPL-MCNC: 36 MG/DL
POC CHOLESTEROL, LDL (DOCK): 154 MG/DL
POC CHOLESTEROL, TOTAL: 238 MG/DL
POC GLUCOSE, FASTING: 79 MG/DL (ref 60–110)
TRIGL SERPL-MCNC: 260 MG/DL

## 2023-09-05 ENCOUNTER — PATIENT MESSAGE (OUTPATIENT)
Dept: PRIMARY CARE CLINIC | Facility: CLINIC | Age: 38
End: 2023-09-05
Payer: COMMERCIAL

## 2023-10-01 ENCOUNTER — PATIENT MESSAGE (OUTPATIENT)
Dept: ADMINISTRATIVE | Facility: OTHER | Age: 38
End: 2023-10-01
Payer: COMMERCIAL

## 2023-10-08 NOTE — TELEPHONE ENCOUNTER
No care due was identified.  Health [facility] Embedded Care Due Messages. Reference number: [id number].   10/07/2023 10:27:41 PM CDT

## 2023-10-09 RX ORDER — VENLAFAXINE HYDROCHLORIDE 75 MG/1
75 CAPSULE, EXTENDED RELEASE ORAL DAILY
Qty: 30 CAPSULE | Refills: 5 | Status: SHIPPED | OUTPATIENT
Start: 2023-10-09

## 2023-11-16 ENCOUNTER — PATIENT MESSAGE (OUTPATIENT)
Dept: PRIMARY CARE CLINIC | Facility: CLINIC | Age: 38
End: 2023-11-16
Payer: COMMERCIAL

## 2023-12-05 DIAGNOSIS — R73.02 IMPAIRED GLUCOSE TOLERANCE: Primary | ICD-10-CM

## 2023-12-05 DIAGNOSIS — I10 BENIGN HYPERTENSION: ICD-10-CM

## 2023-12-11 ENCOUNTER — OFFICE VISIT (OUTPATIENT)
Dept: PRIMARY CARE CLINIC | Facility: CLINIC | Age: 38
End: 2023-12-11
Payer: COMMERCIAL

## 2023-12-11 VITALS
OXYGEN SATURATION: 98 % | TEMPERATURE: 98 F | BODY MASS INDEX: 45.99 KG/M2 | SYSTOLIC BLOOD PRESSURE: 128 MMHG | WEIGHT: 293 LBS | HEART RATE: 65 BPM | HEIGHT: 67 IN | DIASTOLIC BLOOD PRESSURE: 84 MMHG | RESPIRATION RATE: 18 BRPM

## 2023-12-11 DIAGNOSIS — Z00.00 ANNUAL PHYSICAL EXAM: Primary | ICD-10-CM

## 2023-12-11 DIAGNOSIS — G56.02 CARPAL TUNNEL SYNDROME OF LEFT WRIST: ICD-10-CM

## 2023-12-11 DIAGNOSIS — J45.20 MILD INTERMITTENT ASTHMA WITHOUT COMPLICATION: ICD-10-CM

## 2023-12-11 PROCEDURE — 3044F PR MOST RECENT HEMOGLOBIN A1C LEVEL <7.0%: ICD-10-PCS | Mod: CPTII,S$GLB,, | Performed by: INTERNAL MEDICINE

## 2023-12-11 PROCEDURE — 1160F PR REVIEW ALL MEDS BY PRESCRIBER/CLIN PHARMACIST DOCUMENTED: ICD-10-PCS | Mod: CPTII,S$GLB,, | Performed by: INTERNAL MEDICINE

## 2023-12-11 PROCEDURE — 99999 PR PBB SHADOW E&M-EST. PATIENT-LVL V: CPT | Mod: PBBFAC,,, | Performed by: INTERNAL MEDICINE

## 2023-12-11 PROCEDURE — 1159F PR MEDICATION LIST DOCUMENTED IN MEDICAL RECORD: ICD-10-PCS | Mod: CPTII,S$GLB,, | Performed by: INTERNAL MEDICINE

## 2023-12-11 PROCEDURE — 3008F PR BODY MASS INDEX (BMI) DOCUMENTED: ICD-10-PCS | Mod: CPTII,S$GLB,, | Performed by: INTERNAL MEDICINE

## 2023-12-11 PROCEDURE — 1159F MED LIST DOCD IN RCRD: CPT | Mod: CPTII,S$GLB,, | Performed by: INTERNAL MEDICINE

## 2023-12-11 PROCEDURE — 99999 PR PBB SHADOW E&M-EST. PATIENT-LVL V: ICD-10-PCS | Mod: PBBFAC,,, | Performed by: INTERNAL MEDICINE

## 2023-12-11 PROCEDURE — 3074F SYST BP LT 130 MM HG: CPT | Mod: CPTII,S$GLB,, | Performed by: INTERNAL MEDICINE

## 2023-12-11 PROCEDURE — 3079F DIAST BP 80-89 MM HG: CPT | Mod: CPTII,S$GLB,, | Performed by: INTERNAL MEDICINE

## 2023-12-11 PROCEDURE — 3008F BODY MASS INDEX DOCD: CPT | Mod: CPTII,S$GLB,, | Performed by: INTERNAL MEDICINE

## 2023-12-11 PROCEDURE — 99395 PR PREVENTIVE VISIT,EST,18-39: ICD-10-PCS | Mod: S$GLB,,, | Performed by: INTERNAL MEDICINE

## 2023-12-11 PROCEDURE — 99395 PREV VISIT EST AGE 18-39: CPT | Mod: S$GLB,,, | Performed by: INTERNAL MEDICINE

## 2023-12-11 PROCEDURE — 3074F PR MOST RECENT SYSTOLIC BLOOD PRESSURE < 130 MM HG: ICD-10-PCS | Mod: CPTII,S$GLB,, | Performed by: INTERNAL MEDICINE

## 2023-12-11 PROCEDURE — 1160F RVW MEDS BY RX/DR IN RCRD: CPT | Mod: CPTII,S$GLB,, | Performed by: INTERNAL MEDICINE

## 2023-12-11 PROCEDURE — 99214 PR OFFICE/OUTPT VISIT, EST, LEVL IV, 30-39 MIN: ICD-10-PCS | Mod: 25,S$GLB,, | Performed by: INTERNAL MEDICINE

## 2023-12-11 PROCEDURE — 99214 OFFICE O/P EST MOD 30 MIN: CPT | Mod: 25,S$GLB,, | Performed by: INTERNAL MEDICINE

## 2023-12-11 PROCEDURE — 3079F PR MOST RECENT DIASTOLIC BLOOD PRESSURE 80-89 MM HG: ICD-10-PCS | Mod: CPTII,S$GLB,, | Performed by: INTERNAL MEDICINE

## 2023-12-11 PROCEDURE — 3044F HG A1C LEVEL LT 7.0%: CPT | Mod: CPTII,S$GLB,, | Performed by: INTERNAL MEDICINE

## 2023-12-11 RX ORDER — ALBUTEROL SULFATE 90 UG/1
2 AEROSOL, METERED RESPIRATORY (INHALATION) EVERY 6 HOURS PRN
Qty: 18 G | Refills: 2 | Status: SHIPPED | OUTPATIENT
Start: 2023-12-11 | End: 2024-12-10

## 2023-12-11 RX ORDER — LEVALBUTEROL INHALATION SOLUTION 1.25 MG/3ML
3 SOLUTION RESPIRATORY (INHALATION) 3 TIMES DAILY
Qty: 60 EACH | Refills: 3 | Status: SHIPPED | OUTPATIENT
Start: 2023-12-11

## 2023-12-11 NOTE — PROGRESS NOTES
[facility] Note    Chief Complaint      Chief Complaint   Patient presents with    Annual Exam       History of Present Illness      Michelle Messer is a 38 y.o. female who presents today for   Chief Complaint   Patient presents with    Annual Exam   .  Patient comes to appointment here for annual preventative visit with me . Need [facility] screening labs . She will be establishing with new gyn soon as her current has left town . She is currently stable on her current meds . She is no longer taking metformin  which she was for taking from fertility md . She admits she has been aff her exeriocse regimen due to plantar fascitis but this is much better currently.     She incidentally today complains of numbness and tingling in middle and ring finger  of her left hand . She states is constant . She plays piano regularly and     Problem List Items Addressed This Visit          Neuro    Carpal tunnel syndrome of left wrist    Overview     Refer to orhto for eval and treat   Cts splint recomened            Pulmonary    Mild intermittent asthma without complication    Overview     Uses inhaler maybe once per month cont current             Other    Annual physical exam - Primary    Overview     pe documented all labs reviewed .  Cholesterol borderline needs to improve diet and increase exercise               Past Medical History:  Past Medical History:   Diagnosis Date    Allergy     Asthma     Eczema     Hiatal hernia 2009    Hypertension        Past Surgical History:  Past Surgical History:   Procedure Laterality Date    LAPAROSCOPY  11/2022    endometriosis    TONSILLECTOMY         Family History:  family history includes Allergies in her father and mother; Asthma in her father and mother; Colon cancer in her maternal grandfather; Eczema in her mother; Heart disease in her maternal grandmother; Heart failure in her mother and paternal grandmother; Hypertension in her father and mother; Leukemia in her  maternal grandfather; Rheum arthritis in her mother.    Social History:  Social History     Socioeconomic History    Marital status:    Tobacco Use    Smoking status: Former     Current packs/day: 0.00     Types: Cigarettes     Quit date: 4/10/2018     Years since quittin.6    Smokeless tobacco: Never   Substance and Sexual Activity    Alcohol use: Not Currently    Drug use: No    Sexual activity: Yes     Partners: Male     Birth control/protection: None   Social History Narrative    Tobacco: Intermittent use; once per month has 7 cigarettes; Used ages 18 yo-32 yo; max use 0.5 ppd    EtOH: Twice per week, 2 drinks at one time    Drug: None    Employment: Teacher ()     Education: Bachelor's     Lives with  and 8 yo step-son      Social Determinants of Health     Financial Resource Strain: Low Risk  (2022)    Overall Financial Resource Strain (CARDIA)     Difficulty of Paying Living Expenses: Not hard at all   Food Insecurity: No Food Insecurity (2022)    Hunger Vital Sign     Worried About Running Out of Food in the Last Year: Never true     Ran Out of Food in the Last Year: Never true   Transportation Needs: No Transportation Needs (2022)    PRAPARE - Transportation     Lack of Transportation (Medical): No     Lack of Transportation (Non-Medical): No   Physical Activity: Insufficiently Active (2022)    Exercise Vital Sign     Days of Exercise per Week: 1 day     Minutes of Exercise per Session: 10 min   Stress: Stress Concern Present (2022)    [place] [employer] - Occupational Stress Questionnaire     Feeling of Stress : To some extent   Social Connections: Unknown (2022)    Social Connection and Isolation Panel [NHANES]     Frequency of Communication with Friends and Family: More than three times a week     Frequency of Social Gatherings with Friends and Family: Once a week     Active Member of Clubs or Organizations: No      Attends Club or Organization Meetings: Patient refused     Marital Status: Living with partner   Housing Stability: Low Risk  (1/25/2022)    Housing Stability Vital Sign     Unable to Pay for Housing in the Last Year: No     Number of Places Lived in the Last Year: 2     Unstable Housing in the Last Year: No       Review of Systems:   Review of Systems   Constitutional:  Negative for fever and weight loss.   HENT:  Negative for congestion, hearing loss and sore throat.    Eyes:  Negative for blurred vision.   Respiratory:  Negative for cough and shortness of breath.    Cardiovascular:  Negative for chest pain, palpitations, claudication and leg swelling.   Gastrointestinal:  Negative for abdominal pain, constipation, diarrhea and heartburn.   Genitourinary:  Negative for dysuria.   Musculoskeletal:  Negative for back pain and myalgias.   Skin:  Negative for rash.   Neurological:  Positive for tingling. Negative for focal weakness and headaches.   Psychiatric/Behavioral:  Negative for depression and suicidal ideas. The patient is not nervous/anxious.          Medications:  Outpatient Encounter Medications as of 12/11/2023   Medication Sig Dispense Refill    cetirizine (ZYRTEC) 10 MG tablet Take 10 mg by mouth once daily.      diclofenac sodium (VOLTAREN) 1 % Gel 2 g.      FLOVENT HFA 44 mcg/actuation inhaler INL 2 PFS PO BID 10.6 g 3    ibuprofen (ADVIL,MOTRIN) 800 MG tablet Take 1 tablet (800 mg total) by mouth every meal as needed for Pain. 30 tablet 1    labetaloL (NORMODYNE) 200 MG tablet Take 1 tablet (200 mg total) by mouth 2 (two) times daily. 60 tablet 11    metFORMIN (GLUCOPHAGE-XR) 500 MG ER 24hr tablet       mometasone 0.1% (ELOCON) 0.1 % cream Use daily prn for rash 50 g 3    triamcinolone acetonide 0.1% (KENALOG) 0.1 % ointment Apply topically 2 (two) times daily.      venlafaxine (EFFEXOR-XR) 75 MG 24 hr capsule Take 1 capsule (75 mg total) by mouth once daily. 30 capsule 5    [DISCONTINUED] albuterol  (PROAIR HFA) 90 mcg/actuation inhaler Inhale 2 puffs into the lungs every 6 (six) hours as needed for Wheezing. Rescue 18 g 2    [DISCONTINUED] levalbuterol (XOPENEX) 1.25 mg/3 mL nebulizer solution 3 mLs.      albuterol (PROAIR HFA) 90 mcg/actuation inhaler Inhale 2 puffs into the lungs every 6 (six) hours as needed for Wheezing. Rescue 18 g 2    levalbuterol (XOPENEX) 1.25 mg/3 mL nebulizer solution Take 3 mLs by nebulization 3 (three) times daily. 60 each 3     No facility-administered encounter medications on file as of 12/11/2023.        Allergies:  Review of patient's allergies indicates:   Allergen Reactions    Erythromycin Rash         Physical Exam         Vitals:    12/11/23 1309   BP: 128/84   Pulse: 65   Resp: 18   Temp: 98 °F (36.7 °C)         Physical Exam  Constitutional:       Appearance: She is well-developed.   Eyes:      Pupils: Pupils are equal, round, and reactive to light.   Neck:      Thyroid: No thyromegaly.   Cardiovascular:      Rate and Rhythm: Normal rate.      Heart sounds: Normal heart sounds. No murmur heard.     No friction rub. No gallop.   Pulmonary:      Breath sounds: Normal breath sounds.   Abdominal:      General: Bowel sounds are normal.      Palpations: Abdomen is soft.   Musculoskeletal:         General: Normal range of motion.      Cervical back: Normal range of motion.   Lymphadenopathy:      Cervical: No cervical adenopathy.   Skin:     General: Skin is warm.      Findings: No rash.   Neurological:      Mental Status: She is alert and oriented to person, place, and time.      Cranial Nerves: No cranial nerve deficit.   Psychiatric:         Behavior: Behavior normal.          Laboratory:  CBC:  Recent Labs   Lab Result Units 12/08/23  0717   WBC K/uL 10.32   RBC M/uL 4.90   Hemoglobin g/dL 12.1   Hematocrit % 38.6   Platelets K/uL 400   MCV fL 79*   MCH pg 24.7*   MCHC g/dL 31.3*     CMP:  Recent Labs   Lab Result Units 12/08/23  0717   Glucose mg/dL 94   Calcium mg/dL 9.4  "  Albumin g/dL 4.1   Total Protein g/dL 7.7   Sodium mmol/L 140   Potassium mmol/L 4.7   CO2 mmol/L 27   Chloride mmol/L 102   BUN mg/dL 13   Alkaline Phosphatase U/L 71   ALT U/L 20   AST U/L 23   Total Bilirubin mg/dL 0.3     URINALYSIS:  No results for input(s): "COLORU", "CLARITYU", "SPECGRAV", "PHUR", "PROTEINUA", "GLUCOSEU", "BILIRUBINCON", "BLOODU", "WBCU", "RBCU", "BACTERIA", "MUCUS", "NITRITE", "LEUKOCYTESUR", "UROBILINOGEN", "HYALINECASTS" in the last 2160 hours.   LIPIDS:  Recent Labs   Lab Result Units 12/08/23  0717   HDL mg/dL 51   Cholesterol mg/dL 250*   Triglycerides mg/dL 128   LDL Cholesterol mg/dL 173.4*   HDL/Cholesterol Ratio % 20.4   Non-HDL Cholesterol mg/dL 199   Total Cholesterol/HDL Ratio  4.9     TSH:  No results for input(s): "TSH" in the last 2160 hours.  A1C:  Recent Labs   Lab Result Units 12/08/23  0717   Hemoglobin A1C % 5.6       Radiology:        Assessment:     Michelle Messer is a 38 y.o.female with:    Annual physical exam    Carpal tunnel syndrome of left wrist  -     Ambulatory referral/consult to Orthopedics; Future; Expected date: 12/18/2023    Mild intermittent asthma without complication  -     albuterol (PROAIR HFA) 90 mcg/actuation inhaler; Inhale 2 puffs into the lungs every 6 (six) hours as needed for Wheezing. Rescue  Dispense: 18 g; Refill: 2  -     levalbuterol (XOPENEX) 1.25 mg/3 mL nebulizer solution; Take 3 mLs by nebulization 3 (three) times daily.  Dispense: 60 each; Refill: 3          Plan:     Problem List Items Addressed This Visit          Neuro    Carpal tunnel syndrome of left wrist    Overview     Refer to orhto for eval and treat   Cts splint recomened            Pulmonary    Mild intermittent asthma without complication    Overview     Uses inhaler maybe once per month cont current             Other    Annual physical exam - Primary    Overview     pe documented all labs reviewed .  Cholesterol borderline needs to improve diet and increase exercise    "          As above, continue current medications and maintain follow up with specialists.  Return to clinic in 6 months.  '  Frederick W Dantagnan [facility] [place]

## 2023-12-11 NOTE — ADDENDUM NOTE
Addended by: MAYNOR THEODORE on: 12/11/2023 01:28 PM     Modules accepted: Orders, Level of Service

## 2023-12-21 DIAGNOSIS — G56.02 LEFT CARPAL TUNNEL SYNDROME: Primary | ICD-10-CM

## 2023-12-22 ENCOUNTER — PATIENT MESSAGE (OUTPATIENT)
Dept: ORTHOPEDICS | Facility: CLINIC | Age: 38
End: 2023-12-22
Payer: COMMERCIAL

## 2023-12-26 ENCOUNTER — OFFICE VISIT (OUTPATIENT)
Dept: ORTHOPEDICS | Facility: CLINIC | Age: 38
End: 2023-12-26
Payer: COMMERCIAL

## 2023-12-26 ENCOUNTER — HOSPITAL ENCOUNTER (OUTPATIENT)
Dept: RADIOLOGY | Facility: HOSPITAL | Age: 38
Discharge: HOME | End: 2023-12-26
Attending: PHYSICIAN ASSISTANT
Payer: COMMERCIAL

## 2023-12-26 DIAGNOSIS — G56.02 LEFT CARPAL TUNNEL SYNDROME: ICD-10-CM

## 2023-12-26 DIAGNOSIS — G56.02 CARPAL TUNNEL SYNDROME OF LEFT WRIST: ICD-10-CM

## 2023-12-26 DIAGNOSIS — G56.03 BILATERAL CARPAL TUNNEL SYNDROME: Primary | ICD-10-CM

## 2023-12-26 PROCEDURE — 73130 X-RAY EXAM OF HAND: CPT | Mod: 26,LT,, | Performed by: RADIOLOGY

## 2023-12-26 PROCEDURE — 99999 PR PBB SHADOW E&M-EST. PATIENT-LVL III: ICD-10-PCS | Mod: PBBFAC,,, | Performed by: PHYSICIAN ASSISTANT

## 2023-12-26 PROCEDURE — 73130 XR HAND COMPLETE 3 VIEW LEFT: ICD-10-PCS | Mod: 26,LT,, | Performed by: RADIOLOGY

## 2023-12-26 PROCEDURE — 99214 PR OFFICE/OUTPT VISIT, EST, LEVL IV, 30-39 MIN: ICD-10-PCS | Mod: S$GLB,,, | Performed by: PHYSICIAN ASSISTANT

## 2023-12-26 PROCEDURE — 99999 PR PBB SHADOW E&M-EST. PATIENT-LVL III: CPT | Mod: PBBFAC,,, | Performed by: PHYSICIAN ASSISTANT

## 2023-12-26 PROCEDURE — 1159F MED LIST DOCD IN RCRD: CPT | Mod: CPTII,S$GLB,, | Performed by: PHYSICIAN ASSISTANT

## 2023-12-26 PROCEDURE — 1159F PR MEDICATION LIST DOCUMENTED IN MEDICAL RECORD: ICD-10-PCS | Mod: CPTII,S$GLB,, | Performed by: PHYSICIAN ASSISTANT

## 2023-12-26 PROCEDURE — 3044F PR MOST RECENT HEMOGLOBIN A1C LEVEL <7.0%: ICD-10-PCS | Mod: CPTII,S$GLB,, | Performed by: PHYSICIAN ASSISTANT

## 2023-12-26 PROCEDURE — 73130 X-RAY EXAM OF HAND: CPT | Mod: TC,LT

## 2023-12-26 PROCEDURE — 3044F HG A1C LEVEL LT 7.0%: CPT | Mod: CPTII,S$GLB,, | Performed by: PHYSICIAN ASSISTANT

## 2023-12-26 PROCEDURE — 99214 OFFICE O/P EST MOD 30 MIN: CPT | Mod: S$GLB,,, | Performed by: PHYSICIAN ASSISTANT

## 2023-12-26 RX ORDER — METHYLPREDNISOLONE 4 MG/1
TABLET ORAL
Qty: 21 EACH | Refills: 0 | Status: SHIPPED | OUTPATIENT
Start: 2023-12-26 | End: 2023-12-27 | Stop reason: SDUPTHER

## 2023-12-26 RX ORDER — GABAPENTIN 100 MG/1
100 CAPSULE ORAL NIGHTLY
Qty: 30 CAPSULE | Refills: 11 | Status: SHIPPED | OUTPATIENT
Start: 2023-12-26 | End: 2023-12-27 | Stop reason: SDUPTHER

## 2023-12-26 NOTE — PROGRESS NOTES
[facility]  History & Physical  Orthopedics    SUBJECTIVE:      Chief Complaint: Bilateral hand numbness    Referring Provider: Paul Aguilar*     Dr. Frazier is the supervising physician for this encounter/patient    History of Present Illness:  Patient is a 38 y.o. right hand dominant female who presents today with complaints of bilateral hand numbness/tingling, present for about 1 month, no trauma/injury. She reports left long finger numbness that is constant, will increase to the thumb and index finger at night. Right arm numbness at night that keeps her from sleeping. No surgical history on the hands, no prior treatment for this.     The patient is a/an pianist.    Onset of symptoms/DOI was 4 weeks.    Symptoms are aggravated by activity, at night, and during the day.    Symptoms are alleviated by rest.    Symptoms consist of pain and numbness/tingling.    The patient rates their pain as a 4/10.    Attempted treatment(s) and/or interventions include activity modifications, rest.     The patient denies any fevers, chills, N/V, D/C and presents for evaluation.       Past Medical History:   Diagnosis Date    Allergy     Asthma     Eczema     Hiatal hernia 2009    Hypertension      Past Surgical History:   Procedure Laterality Date    LAPAROSCOPY  11/2022    endometriosis    TONSILLECTOMY       Review of patient's allergies indicates:   Allergen Reactions    Erythromycin Rash     Social History     Social History Narrative    Tobacco: Intermittent use; once per month has 7 cigarettes; Used ages 20 yo-34 yo; max use 0.5 ppd    EtOH: Twice per week, 2 drinks at one time    Drug: None    Employment: Teacher ()     Education: Bachelor's     Lives with  and 8 yo step-son      Family History   Problem Relation Age of Onset    Heart failure Paternal Grandmother     Heart disease Maternal Grandmother     Colon cancer Maternal Grandfather     Leukemia Maternal Grandfather      Hypertension Father     Asthma Father     Allergies Father     Rheum arthritis Mother     Asthma Mother     Heart failure Mother     Hypertension Mother     Allergies Mother     Eczema Mother     Breast cancer Neg Hx     Ovarian cancer Neg Hx     Allergic rhinitis Neg Hx     Angioedema Neg Hx     Atopy Neg Hx     Immunodeficiency Neg Hx     Rhinitis Neg Hx     Urticaria Neg Hx          Current Outpatient Medications:     albuterol (PROAIR HFA) 90 mcg/actuation inhaler, Inhale 2 puffs into the lungs every 6 (six) hours as needed for Wheezing. Rescue, Disp: 18 g, Rfl: 2    cetirizine (ZYRTEC) 10 MG tablet, Take 10 mg by mouth once daily., Disp: , Rfl:     diclofenac sodium (VOLTAREN) 1 % Gel, 2 g., Disp: , Rfl:     FLOVENT HFA 44 mcg/actuation inhaler, INL 2 PFS PO BID, Disp: 10.6 g, Rfl: 3    gabapentin (NEURONTIN) 100 MG capsule, Take 1 capsule (100 mg total) by mouth every evening., Disp: 30 capsule, Rfl: 11    ibuprofen (ADVIL,MOTRIN) 800 MG tablet, Take 1 tablet (800 mg total) by mouth every meal as needed for Pain., Disp: 30 tablet, Rfl: 1    labetaloL (NORMODYNE) 200 MG tablet, Take 1 tablet (200 mg total) by mouth 2 (two) times daily., Disp: 60 tablet, Rfl: 11    levalbuterol (XOPENEX) 1.25 mg/3 mL nebulizer solution, Take 3 mLs by nebulization 3 (three) times daily., Disp: 60 each, Rfl: 3    metFORMIN (GLUCOPHAGE-XR) 500 MG ER 24hr tablet, , Disp: , Rfl:     methylPREDNISolone (MEDROL DOSEPACK) 4 mg tablet, use as directed, Disp: 21 each, Rfl: 0    mometasone 0.1% (ELOCON) 0.1 % cream, Use daily prn for rash, Disp: 50 g, Rfl: 3    triamcinolone acetonide 0.1% (KENALOG) 0.1 % ointment, Apply topically 2 (two) times daily., Disp: , Rfl:     venlafaxine (EFFEXOR-XR) 75 MG 24 hr capsule, Take 1 capsule (75 mg total) by mouth once daily., Disp: 30 capsule, Rfl: 5      Review of Systems:  Constitutional: no fever or chills  Eyes: no visual changes  ENT: no nasal congestion or sore throat  Respiratory: no cough  or shortness of breath  Cardiovascular: no chest pain  Gastrointestinal: no nausea or vomiting, tolerating diet  Musculoskeletal: pain, soreness, and numbness/tingling    OBJECTIVE:      Vital Signs (Most Recent):  There were no vitals filed for this visit.  There is no height or weight on file to calculate BMI.      Physical Exam:  Constitutional: The patient appears well-developed and well-nourished. No distress.   Skin: No lesions appreciated  Head: Normocephalic and atraumatic.   Nose: Nose normal.   Ears: No deformities seen  Eyes: Conjunctivae and EOM are normal.   Neck: No tracheal deviation present.   Cardiovascular: Normal rate and intact distal pulses.    Pulmonary/Chest: Effort normal. No respiratory distress.   Abdominal: There is no guarding.   Neurological: The patient is alert.   Psychiatric: The patient has a normal mood and affect.     Bilateral Hand/Wrist Examination:    Observation/Inspection:  Swelling  none    Deformity  none  Discoloration  none     Scars   none    Atrophy  none    HAND/WRIST EXAMINATION:  Finkelstein's Test   Neg  WHAT Test    Neg  Snuff box tenderness   Neg  Lundberg's Test    Neg  Hook of Hamate Tenderness  Neg  CMC grind    Neg  Circumduction test   Neg    Neurovascular Exam:  Digits WWP, brisk CR < 3s throughout  NVI motor/LTS to M/R/U nerves, radial pulse 2+  Tinel's Test - Carpal Tunnel  Pos Bilateral  Tinel's Test - Cubital Tunnel  Pos left  Phalen's Test    Pos bilateral  Median Nerve Compression Test Pos bilateral    ROM hand full, painless    ROM wrist full, painless    ROM elbow full, painless    Abdomen not guarded  Respirations nonlabored  Perfusion intact    Diagnostic Results:     Imaging - I independently viewed the patient's imaging as well as the radiology report.  Xrays of the patient's left hand  demonstrate no evidence of any acute fractures or dislocations or significant degenerative changes.    EMG - none    ASSESSMENT/PLAN:      38 y.o. yo female with  Bilateral carpal tunnel, possible cubital tunnel syndrome    Plan: The patient and I had a thorough discussion today.  We discussed the working diagnosis as well as several other potential alternative diagnoses.  Treatment options were discussed, both conservative and surgical.  Conservative treatment options would include things such as activity modifications, workplace modifications, a period of rest, oral vs topical OTC and prescription anti-inflammatory medications, occupational therapy, splinting/bracing, immobilization, corticosteroid injections, and others.  Surgical options were discussed as well.     At this time, the patient would like to proceed with a trial of EMG for further evaluation. Bilateral CT braces given today. Medrol Dosepack and gabapentin ordered. RTC after EMG for results and treatment plans.    Should the patient's symptoms worsen, persist, or fail to improve they should return for reevaluation and I would be happy to see them back anytime.           Please do not hesitate to reach out to us via email, phone, or MyChart with any questions, concerns, or feedback.

## 2023-12-27 DIAGNOSIS — J45.20 MILD INTERMITTENT ASTHMA WITHOUT COMPLICATION: ICD-10-CM

## 2023-12-27 RX ORDER — ALBUTEROL SULFATE 90 UG/1
2 AEROSOL, METERED RESPIRATORY (INHALATION) EVERY 6 HOURS PRN
Qty: 18 G | Refills: 2 | OUTPATIENT
Start: 2023-12-27 | End: 2024-12-26

## 2023-12-27 RX ORDER — GABAPENTIN 100 MG/1
100 CAPSULE ORAL NIGHTLY
Qty: 30 CAPSULE | Refills: 11 | Status: SHIPPED | OUTPATIENT
Start: 2023-12-27 | End: 2024-12-26

## 2023-12-27 RX ORDER — LEVALBUTEROL INHALATION SOLUTION 1.25 MG/3ML
3 SOLUTION RESPIRATORY (INHALATION) 3 TIMES DAILY
Qty: 60 EACH | Refills: 3 | OUTPATIENT
Start: 2023-12-27

## 2023-12-27 RX ORDER — METHYLPREDNISOLONE 4 MG/1
TABLET ORAL
Qty: 21 EACH | Refills: 0 | Status: SHIPPED | OUTPATIENT
Start: 2023-12-27 | End: 2024-01-11

## 2023-12-27 NOTE — TELEPHONE ENCOUNTER
No care due was identified.  [facility] Embedded Care Due Messages. Reference number: [id number].   12/27/2023 12:43:00 PM CST

## 2024-01-11 ENCOUNTER — OFFICE VISIT (OUTPATIENT)
Dept: PRIMARY CARE CLINIC | Facility: CLINIC | Age: 39
End: 2024-01-11
Payer: COMMERCIAL

## 2024-01-11 VITALS
BODY MASS INDEX: 45.99 KG/M2 | HEIGHT: 67 IN | OXYGEN SATURATION: 98 % | RESPIRATION RATE: 18 BRPM | HEART RATE: 91 BPM | DIASTOLIC BLOOD PRESSURE: 84 MMHG | TEMPERATURE: 98 F | WEIGHT: 293 LBS | SYSTOLIC BLOOD PRESSURE: 138 MMHG

## 2024-01-11 DIAGNOSIS — E78.2 MIXED HYPERLIPIDEMIA: ICD-10-CM

## 2024-01-11 DIAGNOSIS — R73.02 IMPAIRED GLUCOSE TOLERANCE: Primary | ICD-10-CM

## 2024-01-11 DIAGNOSIS — R41.840 LACK OF CONCENTRATION: ICD-10-CM

## 2024-01-11 DIAGNOSIS — G47.00 INSOMNIA, UNSPECIFIED TYPE: ICD-10-CM

## 2024-01-11 DIAGNOSIS — E66.01 CLASS 3 SEVERE OBESITY WITH BODY MASS INDEX (BMI) OF 40.0 TO 44.9 IN ADULT, UNSPECIFIED OBESITY TYPE, UNSPECIFIED WHETHER SERIOUS COMORBIDITY PRESENT: ICD-10-CM

## 2024-01-11 DIAGNOSIS — F33.0 DEPRESSION, MAJOR, RECURRENT, MILD: ICD-10-CM

## 2024-01-11 DIAGNOSIS — J45.20 MILD INTERMITTENT ASTHMA WITHOUT COMPLICATION: ICD-10-CM

## 2024-01-11 DIAGNOSIS — I10 BENIGN HYPERTENSION: ICD-10-CM

## 2024-01-11 PROCEDURE — 99999 PR PBB SHADOW E&M-EST. PATIENT-LVL IV: CPT | Mod: PBBFAC,,, | Performed by: INTERNAL MEDICINE

## 2024-01-11 PROCEDURE — 3075F SYST BP GE 130 - 139MM HG: CPT | Mod: CPTII,S$GLB,, | Performed by: INTERNAL MEDICINE

## 2024-01-11 PROCEDURE — 3008F BODY MASS INDEX DOCD: CPT | Mod: CPTII,S$GLB,, | Performed by: INTERNAL MEDICINE

## 2024-01-11 PROCEDURE — 99214 OFFICE O/P EST MOD 30 MIN: CPT | Mod: S$GLB,,, | Performed by: INTERNAL MEDICINE

## 2024-01-11 PROCEDURE — 1160F RVW MEDS BY RX/DR IN RCRD: CPT | Mod: CPTII,S$GLB,, | Performed by: INTERNAL MEDICINE

## 2024-01-11 PROCEDURE — 1159F MED LIST DOCD IN RCRD: CPT | Mod: CPTII,S$GLB,, | Performed by: INTERNAL MEDICINE

## 2024-01-11 PROCEDURE — 3079F DIAST BP 80-89 MM HG: CPT | Mod: CPTII,S$GLB,, | Performed by: INTERNAL MEDICINE

## 2024-01-11 RX ORDER — TRAZODONE HYDROCHLORIDE 100 MG/1
100 TABLET ORAL NIGHTLY
Qty: 30 TABLET | Refills: 3 | Status: SHIPPED | OUTPATIENT
Start: 2024-01-11 | End: 2025-01-10

## 2024-01-11 NOTE — PROGRESS NOTES
[facility] Note    Chief Complaint      Chief Complaint   Patient presents with    Follow-up    Insomnia       History of Present Illness      Michelle Messer is a 38 y.o. female who presents today for   Chief Complaint   Patient presents with    Follow-up    Insomnia   .  Patient comes to appointment here fro 6 m checkup for chronic issues as below . She is doing well on all medication currently . She would like some hep with insomnia . She is taking melatonin with some benefit . She is also c/o having some difficultly with focus and task completion at her current job..    Problem List Items Addressed This Visit          Neuro    Lack of concentration    Overview     Refer to psychology for assessment of add /adhd            Psychiatric    Depression, major, recurrent, mild    Overview     Doing well on effexor 75 mg cont same            Pulmonary    Mild intermittent asthma without complication    Overview     Uses inhaler maybe once per month cont current             Cardiac/Vascular    Benign hypertension    Overview     bp well controlled on current regimen          Mixed hyperlipidemia       Endocrine    Class 3 severe obesity with body mass index (BMI) of 40.0 to 44.9 in adult    Overview     Will be starting zepbound soon         Impaired glucose tolerance - Primary    Overview     Labs reviewed and look stable             Other    Insomnia    Overview     Trazodone 100 mg as needed               Past Medical History:  Past Medical History:   Diagnosis Date    Allergy     Asthma     Eczema     Hiatal hernia 2009    Hypertension        Past Surgical History:  Past Surgical History:   Procedure Laterality Date    LAPAROSCOPY  11/2022    endometriosis    TONSILLECTOMY         Family History:  family history includes Allergies in her father and mother; Asthma in her father and mother; Colon cancer in her maternal grandfather; Eczema in her mother; Heart disease in her maternal  grandmother; Heart failure in her mother and paternal grandmother; Hypertension in her father and mother; Leukemia in her maternal grandfather; Rheum arthritis in her mother.    Social History:  Social History     Socioeconomic History    Marital status:    Tobacco Use    Smoking status: Former     Current packs/day: 0.00     Types: Cigarettes     Quit date: 4/10/2018     Years since quittin.7    Smokeless tobacco: Never   Substance and Sexual Activity    Alcohol use: Not Currently    Drug use: No    Sexual activity: Yes     Partners: Male     Birth control/protection: None   Social History Narrative    Tobacco: Intermittent use; once per month has 7 cigarettes; Used ages 20 yo-32 yo; max use 0.5 ppd    EtOH: Twice per week, 2 drinks at one time    Drug: None    Employment: Teacher ()     Education: Bachelor's     Lives with  and 10 yo step-son      Social Determinants of Health     Financial Resource Strain: Low Risk  (2022)    Overall Financial Resource Strain (CARDIA)     Difficulty of Paying Living Expenses: Not hard at all   Food Insecurity: No Food Insecurity (2022)    Hunger Vital Sign     Worried About Running Out of Food in the Last Year: Never true     Ran Out of Food in the Last Year: Never true   Transportation Needs: No Transportation Needs (2022)    PRAPARE - Transportation     Lack of Transportation (Medical): No     Lack of Transportation (Non-Medical): No   Physical Activity: Insufficiently Active (2022)    Exercise Vital Sign     Days of Exercise per Week: 1 day     Minutes of Exercise per Session: 10 min   Stress: Stress Concern Present (2022)    [place] [place] [employer] - Occupational Stress Questionnaire     Feeling of Stress : To some extent   Social Connections: Unknown (2022)    Social Connection and Isolation Panel [NHANES]     Frequency of Communication with Friends and Family: More than three times a week      Frequency of Social Gatherings with Friends and Family: Once a week     Active Member of Clubs or Organizations: No     Attends Club or Organization Meetings: Patient declined     Marital Status: Living with partner   Housing Stability: Low Risk  (1/25/2022)    Housing Stability Vital Sign     Unable to Pay for Housing in the Last Year: No     Number of Places Lived in the Last Year: 2     Unstable Housing in the Last Year: No       Review of Systems:   Review of Systems   Constitutional:  Negative for fever and weight loss.   HENT:  Negative for congestion, hearing loss and sore throat.    Eyes:  Negative for blurred vision.   Respiratory:  Negative for cough and shortness of breath.    Cardiovascular:  Negative for chest pain, palpitations, claudication and leg swelling.   Gastrointestinal:  Negative for abdominal pain, constipation, diarrhea and heartburn.   Genitourinary:  Negative for dysuria.   Musculoskeletal:  Negative for back pain and myalgias.   Skin:  Negative for rash.   Neurological:  Negative for focal weakness and headaches.   Psychiatric/Behavioral:  Negative for depression and suicidal ideas. The patient is not nervous/anxious.          Medications:  Outpatient Encounter Medications as of 1/11/2024   Medication Sig Dispense Refill    albuterol (PROAIR HFA) 90 mcg/actuation inhaler Inhale 2 puffs into the lungs every 6 (six) hours as needed for Wheezing. Rescue 18 g 2    cetirizine (ZYRTEC) 10 MG tablet Take 10 mg by mouth once daily.      diclofenac sodium (VOLTAREN) 1 % Gel 2 g.      FLOVENT HFA 44 mcg/actuation inhaler INL 2 PFS PO BID 10.6 g 3    gabapentin (NEURONTIN) 100 MG capsule Take 1 capsule (100 mg total) by mouth every evening. 30 capsule 11    ibuprofen (ADVIL,MOTRIN) 800 MG tablet Take 1 tablet (800 mg total) by mouth every meal as needed for Pain. 30 tablet 1    labetaloL (NORMODYNE) 200 MG tablet Take 1 tablet (200 mg total) by mouth 2 (two) times daily. 60 tablet 11    levalbuterol  (XOPENEX) 1.25 mg/3 mL nebulizer solution Take 3 mLs by nebulization 3 (three) times daily. 60 each 3    mometasone 0.1% (ELOCON) 0.1 % cream Use daily prn for rash 50 g 3    triamcinolone acetonide 0.1% (KENALOG) 0.1 % ointment Apply topically 2 (two) times daily.      venlafaxine (EFFEXOR-XR) 75 MG 24 hr capsule Take 1 capsule (75 mg total) by mouth once daily. 30 capsule 5    [DISCONTINUED] metFORMIN (GLUCOPHAGE-XR) 500 MG ER 24hr tablet       [DISCONTINUED] methylPREDNISolone (MEDROL DOSEPACK) 4 mg tablet use as directed 21 each 0    traZODone (DESYREL) 100 MG tablet Take 1 tablet (100 mg total) by mouth every evening. 30 tablet 3    [DISCONTINUED] gabapentin (NEURONTIN) 100 MG capsule Take 1 capsule (100 mg total) by mouth every evening. 30 capsule 11    [DISCONTINUED] methylPREDNISolone (MEDROL DOSEPACK) 4 mg tablet use as directed 21 each 0     No facility-administered encounter medications on file as of 1/11/2024.        Allergies:  Review of patient's allergies indicates:   Allergen Reactions    Erythromycin Rash         Physical Exam         Vitals:    01/11/24 1403   BP: 138/84   Pulse: 91   Resp: 18   Temp: 98 °F (36.7 °C)         Physical Exam  Constitutional:       Appearance: She is well-developed.   Eyes:      Pupils: Pupils are equal, round, and reactive to light.   Neck:      Thyroid: No thyromegaly.   Cardiovascular:      Rate and Rhythm: Normal rate.      Heart sounds: Normal heart sounds. No murmur heard.     No friction rub. No gallop.   Pulmonary:      Breath sounds: Normal breath sounds.   Abdominal:      General: Bowel sounds are normal.      Palpations: Abdomen is soft.   Musculoskeletal:         General: Normal range of motion.      Cervical back: Normal range of motion.   Lymphadenopathy:      Cervical: No cervical adenopathy.   Skin:     General: Skin is warm.      Findings: No rash.   Neurological:      Mental Status: She is alert and oriented to person, place, and time.      Cranial  "Nerves: No cranial nerve deficit.   Psychiatric:         Behavior: Behavior normal.          Laboratory:  CBC:  Recent Labs   Lab Result Units 12/08/23  0717   WBC K/uL 10.32   RBC M/uL 4.90   Hemoglobin g/dL 12.1   Hematocrit % 38.6   Platelets K/uL 400   MCV fL 79*   MCH pg 24.7*   MCHC g/dL 31.3*     CMP:  Recent Labs   Lab Result Units 12/08/23  0717   Glucose mg/dL 94   Calcium mg/dL 9.4   Albumin g/dL 4.1   Total Protein g/dL 7.7   Sodium mmol/L 140   Potassium mmol/L 4.7   CO2 mmol/L 27   Chloride mmol/L 102   BUN mg/dL 13   Alkaline Phosphatase U/L 71   ALT U/L 20   AST U/L 23   Total Bilirubin mg/dL 0.3     URINALYSIS:  No results for input(s): "COLORU", "CLARITYU", "SPECGRAV", "PHUR", "PROTEINUA", "GLUCOSEU", "BILIRUBINCON", "BLOODU", "WBCU", "RBCU", "BACTERIA", "MUCUS", "NITRITE", "LEUKOCYTESUR", "UROBILINOGEN", "HYALINECASTS" in the last 2160 hours.   LIPIDS:  Recent Labs   Lab Result Units 12/08/23  0717   HDL mg/dL 51   Cholesterol mg/dL 250*   Triglycerides mg/dL 128   LDL Cholesterol mg/dL 173.4*   HDL/Cholesterol Ratio % 20.4   Non-HDL Cholesterol mg/dL 199   Total Cholesterol/HDL Ratio  4.9     TSH:  No results for input(s): "TSH" in the last 2160 hours.  A1C:  Recent Labs   Lab Result Units 12/08/23  0717   Hemoglobin A1C % 5.6       Radiology:        Assessment:     Michelle Messer is a 38 y.o.female with:    Impaired glucose tolerance    Depression, major, recurrent, mild    Class 3 severe obesity with body mass index (BMI) of 40.0 to 44.9 in adult, unspecified obesity type, unspecified whether serious comorbidity present    Benign hypertension    Mild intermittent asthma without complication    Insomnia, unspecified type  -     traZODone (DESYREL) 100 MG tablet; Take 1 tablet (100 mg total) by mouth every evening.  Dispense: 30 tablet; Refill: 3    Lack of concentration    Mixed hyperlipidemia          Plan:     Problem List Items Addressed This Visit          Neuro    Lack of concentration    " Overview     Refer to psychology for assessment of add /adhd            Psychiatric    Depression, major, recurrent, mild    Overview     Doing well on effexor 75 mg cont same            Pulmonary    Mild intermittent asthma without complication    Overview     Uses inhaler maybe once per month cont current             Cardiac/Vascular    Benign hypertension    Overview     bp well controlled on current regimen          Mixed hyperlipidemia       Endocrine    Class 3 severe obesity with body mass index (BMI) of 40.0 to 44.9 in adult    Overview     Will be starting zepbound soon         Impaired glucose tolerance - Primary    Overview     Labs reviewed and look stable             Other    Insomnia    Overview     Trazodone 100 mg as needed             As above, continue current medications and maintain follow up with specialists.  Return to clinic in 6 months.      Frederick W Dantagnan [facility]

## 2024-01-24 ENCOUNTER — OFFICE VISIT (OUTPATIENT)
Dept: OBSTETRICS AND GYNECOLOGY | Facility: CLINIC | Age: 39
End: 2024-01-24
Payer: COMMERCIAL

## 2024-01-24 VITALS — WEIGHT: 293 LBS | SYSTOLIC BLOOD PRESSURE: 134 MMHG | DIASTOLIC BLOOD PRESSURE: 85 MMHG | BODY MASS INDEX: 45.89 KG/M2

## 2024-01-24 DIAGNOSIS — I10 BENIGN HYPERTENSION: ICD-10-CM

## 2024-01-24 DIAGNOSIS — N93.9 ABNORMAL UTERINE BLEEDING (AUB): Primary | ICD-10-CM

## 2024-01-24 PROCEDURE — 3079F DIAST BP 80-89 MM HG: CPT | Mod: CPTII,S$GLB,, | Performed by: STUDENT IN AN ORGANIZED HEALTH CARE EDUCATION/TRAINING PROGRAM

## 2024-01-24 PROCEDURE — 99999 PR PBB SHADOW E&M-EST. PATIENT-LVL III: CPT | Mod: PBBFAC,,, | Performed by: STUDENT IN AN ORGANIZED HEALTH CARE EDUCATION/TRAINING PROGRAM

## 2024-01-24 PROCEDURE — 3075F SYST BP GE 130 - 139MM HG: CPT | Mod: CPTII,S$GLB,, | Performed by: STUDENT IN AN ORGANIZED HEALTH CARE EDUCATION/TRAINING PROGRAM

## 2024-01-24 PROCEDURE — 3008F BODY MASS INDEX DOCD: CPT | Mod: CPTII,S$GLB,, | Performed by: STUDENT IN AN ORGANIZED HEALTH CARE EDUCATION/TRAINING PROGRAM

## 2024-01-24 PROCEDURE — 99214 OFFICE O/P EST MOD 30 MIN: CPT | Mod: S$GLB,,, | Performed by: STUDENT IN AN ORGANIZED HEALTH CARE EDUCATION/TRAINING PROGRAM

## 2024-01-24 PROCEDURE — 1159F MED LIST DOCD IN RCRD: CPT | Mod: CPTII,S$GLB,, | Performed by: STUDENT IN AN ORGANIZED HEALTH CARE EDUCATION/TRAINING PROGRAM

## 2024-01-24 RX ORDER — TRANEXAMIC ACID 650 MG/1
1300 TABLET ORAL 3 TIMES DAILY
Qty: 30 TABLET | Refills: 3 | Status: SHIPPED | OUTPATIENT
Start: 2024-01-24 | End: 2024-01-29

## 2024-01-24 RX ORDER — NIFEDIPINE 30 MG/1
30 TABLET, EXTENDED RELEASE ORAL DAILY
Qty: 30 TABLET | Refills: 11 | Status: SHIPPED | OUTPATIENT
Start: 2024-01-24 | End: 2024-03-11 | Stop reason: DRUGHIGH

## 2024-01-24 NOTE — PROGRESS NOTES
CC: AUB    HPI:  Michelle Messer is a 38 y.o. female  presents with complaint of AUB. Patient went through a miscarriage about 1.5 months ago, bleeding lasted about 3 weeks, passed tissue/heavy. Bleeding finally stopped and then she got a period about 1 week later that fairly normal, then she had Friday then extremely heavy bleeding . She had to use tampons and large pads and was still bleeding through protection, multiple changes of clothes. Bleeding better Monday and nothing since that time. Has not taken another pregnancy test. No cramping or pain with this recent bleeding. Going through fertility treatments with [facility] (IUI), has recently taken a break to get her BP under control. Is on labetalol 400, takes nightly.     ROS:  GENERAL: No fever, chills, fatigability or weight loss.  VULVAR: No pain, no lesions and no itching.  VAGINAL: No relaxation, no itching, no discharge, and no lesions.  ABDOMEN: No abdominal pain. Denies nausea. Denies vomiting. No diarrhea. No constipation  BREAST: Denies pain. No lumps. No discharge.  URINARY: No incontinence, no nocturia, no frequency and no dysuria.  CARDIOVASCULAR: No chest pain. No shortness of breath. No leg cramps.  NEUROLOGICAL: No headaches. No vision changes.      Patient History:  Past Medical History:   Diagnosis Date    Allergy     Asthma     Eczema     Hiatal hernia     Hypertension      Past Surgical History:   Procedure Laterality Date    LAPAROSCOPY  2022    endometriosis    TONSILLECTOMY       Social History     Tobacco Use    Smoking status: Former     Current packs/day: 0.00     Types: Cigarettes     Quit date: 4/10/2018     Years since quittin.7    Smokeless tobacco: Never   Substance Use Topics    Alcohol use: Not Currently    Drug use: No     Family History   Problem Relation Age of Onset    Heart failure Paternal Grandmother     Heart disease Maternal Grandmother     Colon cancer Maternal Grandfather     Leukemia Maternal  Grandfather     Hypertension Father     Asthma Father     Allergies Father     Rheum arthritis Mother     Asthma Mother     Heart failure Mother     Hypertension Mother     Allergies Mother     Eczema Mother     Breast cancer Neg Hx     Ovarian cancer Neg Hx     Allergic rhinitis Neg Hx     Angioedema Neg Hx     Atopy Neg Hx     Immunodeficiency Neg Hx     Rhinitis Neg Hx     Urticaria Neg Hx      OB History    Para Term  AB Living   1 0 0 0 1 0   SAB IAB Ectopic Multiple Live Births   1 0 0 0 0      # Outcome Date GA Lbr Johnny/2nd Weight Sex Delivery Anes PTL Lv   1 SAB 23               Objective:   /85   Wt 132.9 kg (292 lb 15.9 oz)   LMP 2024 (Exact Date)   BMI 45.89 kg/m²   Patient's last menstrual period was 2024 (exact date).      PHYSICAL EXAM:  APPEARANCE: Well nourished, well developed, in no acute distress.  AFFECT: WNL, alert and oriented x 3  SKIN: No acne or hirsutism  NECK: Neck symmetric without masses or thyromegaly  CHEST: Good respiratory effect  EXTREMITIES: No edema.      ASSESSMENT and PLAN:    ICD-10-CM ICD-9-CM    1. Abnormal uterine bleeding (AUB)  N93.9 626.9 US Pelvis Comp with Transvag NON-OB (xpd      tranexamic acid (LYSTEDA) 650 mg tablet      2. Benign hypertension  I10 401.1 NIFEdipine (PROCARDIA-XL) 30 MG (OSM) 24 hr tablet          AUB  - reviewed with patient bleeding potentially heavier due to hormonal changes after miscarriage, possible retained tissue/blood clots. Recommend US to evaluate and ensure no abnormalities in endometrial lining, will get scheduled today   - reviewed options to help treat heavy bleeding that are not contraception (patient would like to keep trying for pregnancy in future): progesterone course 10-30mg daily vs TXA. Patient would like to try TXA if bleeding returns heavy again, rx sent to pharmacy     2. HTN  - discussed trial of procardia instead of labetalol for BP management as it is pregnancy safe as well and  patient does not think labetalol works well for her (elevated Bps often at home)   - rx sent to pharmacy   - if no improvement, will consider EKG/echo cardiology eval as needed       Follow up: as needed if symptoms worsen or WWE as scheduled       Stephanie Heaney, MD OBGYN [facility]

## 2024-02-14 ENCOUNTER — PATIENT MESSAGE (OUTPATIENT)
Dept: OBSTETRICS AND GYNECOLOGY | Facility: CLINIC | Age: 39
End: 2024-02-14

## 2024-02-14 ENCOUNTER — PROCEDURE VISIT (OUTPATIENT)
Dept: NEUROLOGY | Facility: CLINIC | Age: 39
End: 2024-02-14
Payer: COMMERCIAL

## 2024-02-14 ENCOUNTER — OFFICE VISIT (OUTPATIENT)
Dept: OBSTETRICS AND GYNECOLOGY | Facility: CLINIC | Age: 39
End: 2024-02-14
Payer: COMMERCIAL

## 2024-02-14 VITALS
SYSTOLIC BLOOD PRESSURE: 156 MMHG | BODY MASS INDEX: 45.23 KG/M2 | WEIGHT: 288.81 LBS | DIASTOLIC BLOOD PRESSURE: 94 MMHG

## 2024-02-14 DIAGNOSIS — Z01.419 WELL WOMAN EXAM WITH ROUTINE GYNECOLOGICAL EXAM: Primary | ICD-10-CM

## 2024-02-14 DIAGNOSIS — Z31.89 ENCOUNTER FOR FERTILITY PLANNING: ICD-10-CM

## 2024-02-14 DIAGNOSIS — L73.9 FOLLICULITIS: ICD-10-CM

## 2024-02-14 DIAGNOSIS — G56.03 BILATERAL CARPAL TUNNEL SYNDROME: ICD-10-CM

## 2024-02-14 PROCEDURE — 3008F BODY MASS INDEX DOCD: CPT | Mod: CPTII,S$GLB,, | Performed by: STUDENT IN AN ORGANIZED HEALTH CARE EDUCATION/TRAINING PROGRAM

## 2024-02-14 PROCEDURE — 87624 HPV HI-RISK TYP POOLED RSLT: CPT | Performed by: STUDENT IN AN ORGANIZED HEALTH CARE EDUCATION/TRAINING PROGRAM

## 2024-02-14 PROCEDURE — 95886 MUSC TEST DONE W/N TEST COMP: CPT | Mod: LT,S$GLB,, | Performed by: PHYSICAL MEDICINE & REHABILITATION

## 2024-02-14 PROCEDURE — 1159F MED LIST DOCD IN RCRD: CPT | Mod: CPTII,S$GLB,, | Performed by: STUDENT IN AN ORGANIZED HEALTH CARE EDUCATION/TRAINING PROGRAM

## 2024-02-14 PROCEDURE — 3077F SYST BP >= 140 MM HG: CPT | Mod: CPTII,S$GLB,, | Performed by: STUDENT IN AN ORGANIZED HEALTH CARE EDUCATION/TRAINING PROGRAM

## 2024-02-14 PROCEDURE — 99395 PREV VISIT EST AGE 18-39: CPT | Mod: S$GLB,,, | Performed by: STUDENT IN AN ORGANIZED HEALTH CARE EDUCATION/TRAINING PROGRAM

## 2024-02-14 PROCEDURE — 3080F DIAST BP >= 90 MM HG: CPT | Mod: CPTII,S$GLB,, | Performed by: STUDENT IN AN ORGANIZED HEALTH CARE EDUCATION/TRAINING PROGRAM

## 2024-02-14 PROCEDURE — 95911 NRV CNDJ TEST 9-10 STUDIES: CPT | Mod: S$GLB,,, | Performed by: PHYSICAL MEDICINE & REHABILITATION

## 2024-02-14 PROCEDURE — 99999 PR PBB SHADOW E&M-EST. PATIENT-LVL III: CPT | Mod: PBBFAC,,, | Performed by: STUDENT IN AN ORGANIZED HEALTH CARE EDUCATION/TRAINING PROGRAM

## 2024-02-14 PROCEDURE — 88175 CYTOPATH C/V AUTO FLUID REDO: CPT | Performed by: STUDENT IN AN ORGANIZED HEALTH CARE EDUCATION/TRAINING PROGRAM

## 2024-02-14 RX ORDER — LETROZOLE 2.5 MG/1
2.5 TABLET, FILM COATED ORAL DAILY
Qty: 5 TABLET | Refills: 0 | Status: SHIPPED | OUTPATIENT
Start: 2024-02-14 | End: 2024-02-16

## 2024-02-14 RX ORDER — MUPIROCIN 20 MG/G
OINTMENT TOPICAL 3 TIMES DAILY
Qty: 15 G | Refills: 1 | Status: SHIPPED | OUTPATIENT
Start: 2024-02-14

## 2024-02-14 NOTE — PROGRESS NOTES
CC: Well woman exam    HPI:  Michelle Messer is a 38 y.o. female  presents for a well woman exam.  She has no issues, problems, or complaints. Bleeding stopped about 5 days ago, no cramping or pain. Would like to keep trying for pregnancy once her next cycle begins.       Patient history:   Past Medical History:   Diagnosis Date    Allergy     Asthma     Eczema     Hiatal hernia 2009    Hypertension      Past Surgical History:   Procedure Laterality Date    LAPAROSCOPY  2022    endometriosis    TONSILLECTOMY       OB History    Para Term  AB Living   1 0 0 0 1 0   SAB IAB Ectopic Multiple Live Births   1 0 0 0 0      # Outcome Date GA Lbr Johnny/2nd Weight Sex Delivery Anes PTL Lv   1 SAB 23               GYN  Menopausal: No  History of abnormal paps: DENIES  Abnormal or postmenopausal bleeding: DENIES  History of abnormal mammograms:N/A   Family history of breast or ovarian cancer: DENIES  Any breast masses, pain, skin changes, or nipple discharge:  has noticed small white spot on right breast  Possible recent STD exposure: denies  Contraception: None    Pap: 2024, Done today  Mammogram: N/A      Family History   Problem Relation Age of Onset    Heart failure Paternal Grandmother     Heart disease Maternal Grandmother     Colon cancer Maternal Grandfather     Leukemia Maternal Grandfather     Hypertension Father     Asthma Father     Allergies Father     Rheum arthritis Mother     Asthma Mother     Heart failure Mother     Hypertension Mother     Allergies Mother     Eczema Mother     Breast cancer Neg Hx     Ovarian cancer Neg Hx     Allergic rhinitis Neg Hx     Angioedema Neg Hx     Atopy Neg Hx     Immunodeficiency Neg Hx     Rhinitis Neg Hx     Urticaria Neg Hx      Social History     Tobacco Use    Smoking status: Former     Current packs/day: 0.00     Types: Cigarettes     Quit date: 4/10/2018     Years since quittin.8    Smokeless tobacco: Never   Substance Use Topics     Alcohol use: Not Currently    Drug use: No     Allergies: Erythromycin    Current Outpatient Medications:     albuterol (PROAIR HFA) 90 mcg/actuation inhaler, Inhale 2 puffs into the lungs every 6 (six) hours as needed for Wheezing. Rescue, Disp: 18 g, Rfl: 2    cetirizine (ZYRTEC) 10 MG tablet, Take 10 mg by mouth once daily., Disp: , Rfl:     FLOVENT HFA 44 mcg/actuation inhaler, INL 2 PFS PO BID, Disp: 10.6 g, Rfl: 3    gabapentin (NEURONTIN) 100 MG capsule, Take 1 capsule (100 mg total) by mouth every evening., Disp: 30 capsule, Rfl: 11    levalbuterol (XOPENEX) 1.25 mg/3 mL nebulizer solution, Take 3 mLs by nebulization 3 (three) times daily., Disp: 60 each, Rfl: 3    mometasone 0.1% (ELOCON) 0.1 % cream, Use daily prn for rash, Disp: 50 g, Rfl: 3    NIFEdipine (PROCARDIA-XL) 30 MG (OSM) 24 hr tablet, Take 1 tablet (30 mg total) by mouth once daily., Disp: 30 tablet, Rfl: 11    traZODone (DESYREL) 100 MG tablet, Take 1 tablet (100 mg total) by mouth every evening., Disp: 30 tablet, Rfl: 3    triamcinolone acetonide 0.1% (KENALOG) 0.1 % ointment, Apply topically 2 (two) times daily., Disp: , Rfl:     venlafaxine (EFFEXOR-XR) 75 MG 24 hr capsule, Take 1 capsule (75 mg total) by mouth once daily., Disp: 30 capsule, Rfl: 5    diclofenac sodium (VOLTAREN) 1 % Gel, 2 g., Disp: , Rfl:     ibuprofen (ADVIL,MOTRIN) 800 MG tablet, Take 1 tablet (800 mg total) by mouth every meal as needed for Pain., Disp: 30 tablet, Rfl: 1    letrozole (FEMARA) 2.5 mg Tab, Take 1 tablet (2.5 mg total) by mouth once daily On cycle day 3-7., Disp: 5 tablet, Rfl: 0       ROS:  GENERAL: Denies weight gain or weight loss. Feeling well overall.   SKIN: Denies rash or lesions.   HEAD: Denies head injury or headache.   NODES: Denies enlarged lymph nodes.   CHEST: Denies chest pain or shortness of breath.   CARDIOVASCULAR: Denies palpitations or left sided chest pain.   ABDOMEN: No abdominal pain, constipation, diarrhea, nausea, vomiting or  rectal bleeding.   URINARY: No frequency, dysuria, hematuria, or burning on urination.  REPRODUCTIVE: See HPI.   BREASTS: The patient performs breast self-examination and denies pain, lumps, or nipple discharge.   HEMATOLOGIC: No easy bruisability or excessive bleeding.  MUSCULOSKELETAL: Denies joint pain or swelling.   NEUROLOGIC: Denies syncope or weakness.   PSYCHIATRIC: Denies depression, anxiety or mood swings.    Objective:   BP (!) 156/94   Wt 131 kg (288 lb 12.8 oz)   LMP 02/01/2024 (Exact Date)   BMI 45.23 kg/m²       Physical Exam:  APPEARANCE: Well nourished, well developed, in no acute distress.  AFFECT: WNL, alert and oriented x 3  SKIN: No acne or hirsutism  NECK: Neck symmetric without masses or thyromegaly  CHEST: Good respiratory effect  ABDOMEN: Soft.  No tenderness or masses.  No hepatosplenomegaly.  No hernias.  BREASTS: Symmetrical, no skin changes or visible lesions.  No palpable masses, nipple discharge bilaterally. Small white round skin spot at 8 oclock position 1cm from nipple, benign skin changes no underlying mass   PELVIC: Normal external genitalia without lesions.  Normal hair distribution.  Adequate perineal body, normal urethral meatus.  Vagina moist and well rugated without lesions or discharge.  Cervix pink, without lesions, discharge or tenderness.  No significant cystocele or rectocele.  Bimanual exam shows uterus to be normal size, regular, mobile and nontender.  Adnexa without masses or tenderness.   EXTREMITIES: No edema.    ASSESSMENT AND PLAN  1. Well woman exam with routine gynecological exam  Liquid-Based Pap Smear, Screening    HPV High Risk Genotypes, PCR      2. Encounter for fertility planning  letrozole (FEMARA) 2.5 mg Tab          Annual exam  Breast and pelvic exam: wnl  Patient counseled on ASCCP guidelines for cervical cytology screening  Cervical screening: completed today   Patient counseled on current recommendations for breast cancer screening  Mammogram  screening: at 41yo   STD testing: not requested today   Will continue PNV, discussed trial of letrazole for OI patient desires to try with next cycle, instructions sent to patient and rx ordered   Osteoporosis screening at 65  Tobacco cessation counseling: former smoker quit     She was counseled to follow up with her PCP for other routine health maintenance      Follow up in about 1 year (around 2/14/2025).      Taya Mclean MD  OBGYN [facility]

## 2024-02-14 NOTE — PROCEDURES
Test Date:  2024    Patient: prisca messer : 1985 Physician: Mario Araiza D.O.   ID#: [id number] SEX: Female Ref. Phys: Russo-Digeorge, Jamie L*     HPI: Prisca Messer is a 38 y.o.female who presents for NCS/EMG to evaluate for CTS.      NCV & EMG Findings:  Evaluation of the left median motor and the right median motor nerves showed prolonged distal onset latency.  The left median sensory nerve showed prolonged distal peak latency and decreased conduction velocity.  The right median sensory nerve showed prolonged distal peak latency, reduced amplitude, and decreased conduction velocity.  All remaining nerves (as indicated in the following tables) were within normal limits.  All examined muscles (as indicated in the following table) showed no evidence of electrical instability.  Note: patient had a vasovagal response with needle EMG of the left APB, with pre syncopal symptoms, no syncope.  She was placed in supine position with legs elevated and given cold water.  She felt back to normal seated and with BP 120s/80s.  She felt steady on her feet prior to leaving.  RUE was not evaluated with needle EMG for this reason.      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 Carpal tunnel syndrome).  There is no motor axonal loss.  There is no active denervation.  This is graded as Moderate in severity bilaterally.    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  This is not a pathologic finding, rather a normal variant.          ___________________________  Mario Araiza D.O.        NCS+  Motor Nerve Results      Latency Amplitude F-Lat Segment Distance CV Comment   Site (ms) Norm (mV) Norm (ms)  (cm) (m/s) Norm    Left Median (APB)   Wrist *4.9  < 4.4 9.0  > 5.9         Elbow 7.2 - 5.5 -  Elbow-Wrist 22 96  > 53    Right Median (APB)   Wrist *4.9  < 4.4  9.8  > 5.9         Elbow 9.2 - 8.9 -  Elbow-Wrist 23 53  > 53    Left Ulnar (ADM)   Wrist 2.5  < 3.7 8.7  > 3.0         Bel Elbow 6.1 - 7.8 -  Bel Elbow-Wrist 24 67  > 52    Abv Elbow 7.0 - 7.2 -  Abv Elbow-Bel Elbow 9 100  > 43    Right Ulnar (ADM)   Wrist 1.95  < 3.7 6.6  > 3.0         Bel Elbow 6.2 - 6.1 -  Bel Elbow-Wrist 25 58  > 52    Abv Elbow 7.2 - 6.1 -  Abv Elbow-Bel Elbow 8 80  > 43      Sensory Nerve Results      Latency (Peak) Amplitude (P-P) Segment Distance CV Comment   Site (ms) Norm (µV) Norm  (cm) (m/s) Norm    Left Median (Rec:Dig II)   Wrist *4.7  < 4.0 22  > 13 Wrist-Dig II 14 *30  > 39    Right Median (Rec:Dig II)   Wrist *4.8  < 4.0 *9  > 13 Wrist-Dig II 14 *29  > 39    Left Ulnar (Rec:Dig V)   Wrist 2.5  < 4.0 62  > 8 Wrist-Dig V 14 56  > 38    Right Ulnar (Rec:Dig V)   Wrist 2.4  < 4.0 63  > 8 Wrist-Dig V 14 58  > 38    Right Radial (Rec:Wrist)   Forearm 1.75  < 2.8 18  > 11 Forearm-Wrist 10 57 -      EMG+     Side Muscle Nerve Root Ins Act Fibs Psw Amp Dur Poly Recrt Int Pat Comment   Left Biceps Musculocut C5-C6 Nml Nml Nml Nml Nml 0 Nml Nml    Left Deltoid Axillary C5-C6 Nml Nml Nml Nml Nml 0 Nml Nml    Left Pronator Teres Median C6-C7 Nml Nml Nml Nml Nml 0 Nml Nml    Left Triceps Radial C6-C8 Nml Nml Nml Nml Nml 0 Nml Nml    Left APB Median C8-T1 Nml Nml Nml Nml Nml 0 Nml Nml            Waveforms:    Motor              Sensory

## 2024-02-16 DIAGNOSIS — Z31.89 ENCOUNTER FOR FERTILITY PLANNING: ICD-10-CM

## 2024-02-16 RX ORDER — LETROZOLE 2.5 MG/1
TABLET, FILM COATED ORAL
Qty: 5 TABLET | Refills: 0 | Status: SHIPPED | OUTPATIENT
Start: 2024-02-16 | End: 2024-02-26

## 2024-02-20 LAB
FINAL PATHOLOGIC DIAGNOSIS: NORMAL
Lab: NORMAL

## 2024-02-26 DIAGNOSIS — Z31.89 ENCOUNTER FOR FERTILITY PLANNING: ICD-10-CM

## 2024-02-26 RX ORDER — LETROZOLE 2.5 MG/1
TABLET, FILM COATED ORAL
Qty: 5 TABLET | Refills: 0 | Status: SHIPPED | OUTPATIENT
Start: 2024-02-26

## 2024-02-26 NOTE — TELEPHONE ENCOUNTER
Refill Routing Note   Medication(s) are not appropriate for processing by [facility] for the following reason(s):        Outside of protocol    [facility] action(s):  Route               Appointments  past 12m or future 3m with PCP    Date Provider   Last Visit   2/14/2024 Taya Mclean MD   Next Visit   Visit date not found Taya Mclean MD   ED visits in past 90 days: 0        Note composed:12:52 PM 02/26/2024

## 2024-03-09 ENCOUNTER — PATIENT MESSAGE (OUTPATIENT)
Dept: OBSTETRICS AND GYNECOLOGY | Facility: CLINIC | Age: 39
End: 2024-03-09
Payer: COMMERCIAL

## 2024-03-10 ENCOUNTER — OFFICE VISIT (OUTPATIENT)
Dept: URGENT CARE | Facility: CLINIC | Age: 39
End: 2024-03-10
Payer: COMMERCIAL

## 2024-03-10 VITALS
WEIGHT: 279 LBS | HEART RATE: 75 BPM | TEMPERATURE: 98 F | RESPIRATION RATE: 18 BRPM | HEIGHT: 67 IN | BODY MASS INDEX: 43.79 KG/M2 | SYSTOLIC BLOOD PRESSURE: 159 MMHG | OXYGEN SATURATION: 97 % | DIASTOLIC BLOOD PRESSURE: 93 MMHG

## 2024-03-10 DIAGNOSIS — E78.5 HYPERLIPIDEMIA, UNSPECIFIED HYPERLIPIDEMIA TYPE: ICD-10-CM

## 2024-03-10 DIAGNOSIS — J45.20 MILD INTERMITTENT ASTHMA WITHOUT COMPLICATION: ICD-10-CM

## 2024-03-10 DIAGNOSIS — J01.10 ACUTE FRONTAL SINUSITIS, RECURRENCE NOT SPECIFIED: ICD-10-CM

## 2024-03-10 DIAGNOSIS — I10 HYPERTENSION, UNSPECIFIED TYPE: ICD-10-CM

## 2024-03-10 DIAGNOSIS — J06.9 URI WITH COUGH AND CONGESTION: Primary | ICD-10-CM

## 2024-03-10 DIAGNOSIS — R09.81 NASAL CONGESTION: ICD-10-CM

## 2024-03-10 LAB
CTP QC/QA: YES
SARS-COV-2 AG RESP QL IA.RAPID: NEGATIVE

## 2024-03-10 PROCEDURE — 99214 OFFICE O/P EST MOD 30 MIN: CPT | Mod: S$GLB,,, | Performed by: PHYSICIAN ASSISTANT

## 2024-03-10 PROCEDURE — 87811 SARS-COV-2 COVID19 W/OPTIC: CPT | Mod: QW,S$GLB,, | Performed by: PHYSICIAN ASSISTANT

## 2024-03-10 RX ORDER — PREDNISONE 20 MG/1
20 TABLET ORAL DAILY
Qty: 4 TABLET | Refills: 0 | Status: SHIPPED | OUTPATIENT
Start: 2024-03-10 | End: 2024-03-14

## 2024-03-10 RX ORDER — BENZONATATE 100 MG/1
200 CAPSULE ORAL 3 TIMES DAILY PRN
Qty: 30 CAPSULE | Refills: 0 | Status: SHIPPED | OUTPATIENT
Start: 2024-03-10 | End: 2024-03-20

## 2024-03-10 NOTE — PATIENT INSTRUCTIONS
You have been prescribed a steroid today. Take the prescription as directed. Steroids can increase blood sugar. You can also have the following when taking steroids: flushing, jitteriness, weight gain, fluid retention, bone weakening. If you develop any adverse symptoms, stop taking the medication immediately.    Thank you for choosing [facility]!     Our goal in the Urgent Care is to always provide outstanding medical care. You may receive a survey by mail or e-mail in the next week regarding your experience today. We would greatly appreciate you completing and returning the survey. Your feedback provides us with a way to recognize our staff who provide very good care, and it helps us learn how to improve when your experience was below our aspiration of excellence.       We appreciate you trusting us with your medical care. We hope you feel better soon. We will be happy to take care of you for all of your future medical needs.    You must understand that you've received an Urgent Care treatment only and that you may be released before all your medical problems are known or treated. You, the patient, will arrange for follow up care as instructed.      Follow up with your PCP or specialty clinic as instructed in the next 2-3 days if not improved or as needed. You can call [phone] to schedule an appointment with appropriate provider.      If you condition worsens, we recommend that you receive another evaluation at the emergency room immediately or contact your primary medical clinic's after hours call service to discuss your concerns.      Please return here or go to the Emergency Department for any concerns or worsening condition.

## 2024-03-10 NOTE — PROGRESS NOTES
"Subjective:      Patient ID: Michelle Messer is a 38 y.o. female.    Vitals:  height is 5' 7" (1.702 m) and weight is 126.6 kg (279 lb). Her oral temperature is 97.8 °F (36.6 °C). Her blood pressure is 159/93 (abnormal) and her pulse is 75. Her respiration is 18 and oxygen saturation is 97%.     Chief Complaint: Sinus Problem    Patient provider note starts here:  Patient presents with complaints of URI like symptoms including cough, nasal congestion, post nasal drip and headache. She has had symptoms for and been treating with Dayquil x3 days now. She is a  but denies direct known exposure to a sick child. She also reports that her BP has been elevated recently and she messaged her provider yesterday in regards to this. Denies chest pain. Her headache is mainly over the frontal sinus area.     Sinus Problem  This is a new problem. The current episode started in the past 7 days (3/7). The problem has been gradually worsening since onset. There has been no fever. Her pain is at a severity of 7/10. The pain is mild. Associated symptoms include congestion, coughing, headaches and a sore throat. Pertinent negatives include no neck pain or shortness of breath. Past treatments include oral decongestants (dayquil). The treatment provided mild relief.       Constitution: Positive for fatigue. Negative for fever.   HENT:  Positive for congestion, postnasal drip and sore throat.    Neck: Negative for neck pain.   Cardiovascular:  Negative for chest pain, palpitations and sob on exertion.   Respiratory:  Positive for cough. Negative for chest tightness, shortness of breath and wheezing.    Gastrointestinal:  Negative for abdominal pain, vomiting and diarrhea.   Skin:  Negative for color change and wound.   Neurological:  Positive for headaches. Negative for numbness and tingling.      Objective:     Physical Exam   Constitutional: She is oriented to person, place, and time. She appears well-developed. She is " cooperative.  Non-toxic appearance. She appears ill. No distress.   HENT:   Head: Normocephalic and atraumatic.   Ears:   Right Ear: Hearing, tympanic membrane, external ear and ear canal normal.   Left Ear: Hearing, tympanic membrane, external ear and ear canal normal.   Nose: Congestion present. No mucosal edema, rhinorrhea or nasal deformity. No epistaxis. Right sinus exhibits no maxillary sinus tenderness and no frontal sinus tenderness. Left sinus exhibits no maxillary sinus tenderness and no frontal sinus tenderness.   Mouth/Throat: Uvula is midline and mucous membranes are normal. No trismus in the jaw. Normal dentition. No uvula swelling. Posterior oropharyngeal erythema present. No oropharyngeal exudate or posterior oropharyngeal edema.   Eyes: Conjunctivae and lids are normal. No scleral icterus.   Neck: Trachea normal and phonation normal. Neck supple. No edema present. No erythema present. No neck rigidity present.   Cardiovascular: Normal rate, regular rhythm, normal heart sounds and normal pulses.   Pulmonary/Chest: Effort normal and breath sounds normal. No respiratory distress. She has no decreased breath sounds. She has no wheezes. She has no rhonchi.         Comments: Dry cough noted on exam.       Abdominal: Normal appearance.   Musculoskeletal: Normal range of motion.         General: No deformity. Normal range of motion.   Neurological: She is alert and oriented to person, place, and time. She exhibits normal muscle tone. Coordination normal.   Skin: Skin is warm, dry, intact, not diaphoretic and not pale.   Psychiatric: Her speech is normal and behavior is normal. Judgment and thought content normal.   Nursing note and vitals reviewed.      Assessment:     1. URI with cough and congestion    2. Nasal congestion    3. Acute frontal sinusitis, recurrence not specified    4. Hypertension, unspecified type    5. Mild intermittent asthma without complication    6. Hyperlipidemia, unspecified  hyperlipidemia type      Results for orders placed or performed in visit on 03/10/24   SARS Coronavirus 2 Antigen, POCT Manual Read   Result Value Ref Range    SARS Coronavirus 2 Antigen Negative Negative     Acceptable Yes        Plan:       URI with cough and congestion  -     benzonatate (TESSALON) 100 MG capsule; Take 2 capsules (200 mg total) by mouth 3 (three) times daily as needed for Cough.  Dispense: 30 capsule; Refill: 0    Nasal congestion  -     SARS Coronavirus 2 Antigen, POCT Manual Read    Acute frontal sinusitis, recurrence not specified  -     predniSONE (DELTASONE) 20 MG tablet; Take 1 tablet (20 mg total) by mouth once daily. for 4 days  Dispense: 4 tablet; Refill: 0    Hypertension, unspecified type    Mild intermittent asthma without complication    Hyperlipidemia, unspecified hyperlipidemia type          Medical Decision Making:   History:   Old Medical Records: I decided to obtain old medical records.  Clinical Tests:   Lab Tests: Ordered and Reviewed  Urgent Care Management:  A. Problem List:   -Acute: URI with cough and congestion, acute frontal sinusitis    -Chronic: HTN  B. Differential diagnosis: viral vs bacterial URI, pharyngitis, otitis, COVID 19, influenza, pneumonia  C. Diagnostic Testing Ordered: COVID   D. Diagnostic Testing Considered: Flu (patient declined)  E. Independent Historians:   F. Urgent Care Midlevel Independent Results Interpretation: COVID negative  G. Radiology:  H. Review of Previous Medical Records: Has been on Procardia prescribed by PCP and is trying to better manage her BP which in attempts to become pregnant. Recent miscarriage 2-3 months ago.   I. Home Medications Reviewed  J. Social Determinants of Health considered  K. Medical Decision Making and Disposition: Patient presents with complaints of URI like symptoms for the past few days. On exam, she is afebrile and nontoxic appearing. Lungs CTAB. BP is elevated. She is on Procardia but may also  be due to the Dayquil that she has been taking. COVID is negative. Likely other viral etiology. Symptomatic treatment options discussed with the patient. She requested oral steroids but I strongly educated on the risks of increased BP while on the steroids. She should strictly monitor her BP and take only Coricidin HBP OTC in addition to the prescribed medications. ED precautions discussed, she verbalized understanding and agreed with plan.            Patient Instructions   You have been prescribed a steroid today. Take the prescription as directed. Steroids can increase blood sugar. You can also have the following when taking steroids: flushing, jitteriness, weight gain, fluid retention, bone weakening. If you develop any adverse symptoms, stop taking the medication immediately.    Thank you for choosing [facility]!     Our goal in the Urgent Care is to always provide outstanding medical care. You may receive a survey by mail or e-mail in the next week regarding your experience today. We would greatly appreciate you completing and returning the survey. Your feedback provides us with a way to recognize our staff who provide very good care, and it helps us learn how to improve when your experience was below our aspiration of excellence.       We appreciate you trusting us with your medical care. We hope you feel better soon. We will be happy to take care of you for all of your future medical needs.    You must understand that you've received an Urgent Care treatment only and that you may be released before all your medical problems are known or treated. You, the patient, will arrange for follow up care as instructed.      Follow up with your PCP or specialty clinic as instructed in the next 2-3 days if not improved or as needed. You can call [phone] to schedule an appointment with appropriate provider.      If you condition worsens, we recommend that you receive another evaluation at the emergency  room immediately or contact your primary medical clinic's after hours call service to discuss your concerns.      Please return here or go to the Emergency Department for any concerns or worsening condition.

## 2024-03-11 PROBLEM — Z00.00 ANNUAL PHYSICAL EXAM: Status: RESOLVED | Noted: 2021-12-28 | Resolved: 2024-03-11

## 2024-03-11 RX ORDER — NIFEDIPINE 60 MG/1
60 TABLET, EXTENDED RELEASE ORAL DAILY
Qty: 90 TABLET | Refills: 3 | Status: SHIPPED | OUTPATIENT
Start: 2024-03-11 | End: 2024-05-30 | Stop reason: SDUPTHER

## 2024-03-29 ENCOUNTER — OFFICE VISIT (OUTPATIENT)
Dept: URGENT CARE | Facility: CLINIC | Age: 39
End: 2024-03-29
Payer: COMMERCIAL

## 2024-03-29 VITALS
TEMPERATURE: 98 F | SYSTOLIC BLOOD PRESSURE: 146 MMHG | OXYGEN SATURATION: 98 % | DIASTOLIC BLOOD PRESSURE: 92 MMHG | HEART RATE: 104 BPM | RESPIRATION RATE: 20 BRPM | WEIGHT: 277 LBS | BODY MASS INDEX: 43.47 KG/M2 | HEIGHT: 67 IN

## 2024-03-29 DIAGNOSIS — R30.0 DYSURIA: Primary | ICD-10-CM

## 2024-03-29 DIAGNOSIS — N39.0 URINARY TRACT INFECTION WITH HEMATURIA, SITE UNSPECIFIED: ICD-10-CM

## 2024-03-29 DIAGNOSIS — R31.9 URINARY TRACT INFECTION WITH HEMATURIA, SITE UNSPECIFIED: ICD-10-CM

## 2024-03-29 LAB
B-HCG UR QL: NEGATIVE
BILIRUB UR QL STRIP: POSITIVE
CTP QC/QA: YES
GLUCOSE UR QL STRIP: NEGATIVE
KETONES UR QL STRIP: NEGATIVE
LEUKOCYTE ESTERASE UR QL STRIP: POSITIVE
PH, POC UA: 7 (ref 5–8)
POC BLOOD, URINE: POSITIVE
POC NITRATES, URINE: NEGATIVE
PROT UR QL STRIP: POSITIVE
SP GR UR STRIP: 1.02 (ref 1–1.03)
UROBILINOGEN UR STRIP-ACNC: 2 (ref 0.1–1.1)

## 2024-03-29 PROCEDURE — 81025 URINE PREGNANCY TEST: CPT | Mod: S$GLB,,, | Performed by: PHYSICIAN ASSISTANT

## 2024-03-29 PROCEDURE — 99213 OFFICE O/P EST LOW 20 MIN: CPT | Mod: S$GLB,,, | Performed by: PHYSICIAN ASSISTANT

## 2024-03-29 PROCEDURE — 81003 URINALYSIS AUTO W/O SCOPE: CPT | Mod: QW,S$GLB,, | Performed by: PHYSICIAN ASSISTANT

## 2024-03-29 RX ORDER — NITROFURANTOIN 25; 75 MG/1; MG/1
100 CAPSULE ORAL 2 TIMES DAILY
Qty: 14 CAPSULE | Refills: 0 | Status: SHIPPED | OUTPATIENT
Start: 2024-03-29

## 2024-03-29 RX ORDER — PHENAZOPYRIDINE HYDROCHLORIDE 100 MG/1
100 TABLET, FILM COATED ORAL 3 TIMES DAILY PRN
Qty: 10 TABLET | Refills: 1 | Status: SHIPPED | OUTPATIENT
Start: 2024-03-29

## 2024-03-29 RX ORDER — TIRZEPATIDE 5 MG/.5ML
INJECTION, SOLUTION SUBCUTANEOUS
COMMUNITY
Start: 2024-03-13

## 2024-03-29 NOTE — PROGRESS NOTES
"Subjective:      Patient ID: Michelle Messer is a 38 y.o. female.    Vitals:  height is 5' 7" (1.702 m) and weight is 125.6 kg (277 lb). Her oral temperature is 98.1 °F (36.7 °C). Her blood pressure is 146/92 (abnormal) and her pulse is 104. Her respiration is 20 and oxygen saturation is 98%.     Chief Complaint: Dysuria    Pt complains of dysuria, urinary frequency and hematuria that started early this morning. Pt denies fever.  No nausea vomiting diarrhea.  No flank pain no abdominal pain    Dysuria   This is a new problem. The current episode started today. The problem occurs every urination. The problem has been unchanged. The quality of the pain is described as aching and burning. The pain is at a severity of 7/10. The pain is moderate. There has been no fever. Associated symptoms include frequency, hematuria and urgency. She has tried nothing for the symptoms.       Constitution: Negative for fever.   Genitourinary:  Positive for dysuria, frequency, urgency and hematuria.   Musculoskeletal:  Negative for back pain.      Objective:     Physical Exam   Constitutional: She is oriented to person, place, and time. She appears well-developed.   HENT:   Head: Normocephalic and atraumatic.   Ears:   Right Ear: External ear normal.   Left Ear: External ear normal.   Nose: Nose normal.   Mouth/Throat: Mucous membranes are normal.   Eyes: Conjunctivae and lids are normal.   Neck: Trachea normal. Neck supple.   Cardiovascular: Normal rate, regular rhythm and normal heart sounds.   Pulmonary/Chest: Effort normal and breath sounds normal. No respiratory distress.   Abdominal: Normal appearance and bowel sounds are normal. She exhibits no distension and no mass. Soft. There is no abdominal tenderness. There is no rebound, no guarding, no left CVA tenderness and no right CVA tenderness.   Musculoskeletal: Normal range of motion.         General: Normal range of motion.   Neurological: She is alert and oriented to person, place, " and time. She has normal strength.   Skin: Skin is warm, dry, intact, not diaphoretic and not pale.   Psychiatric: Her speech is normal and behavior is normal. Judgment and thought content normal.   Nursing note and vitals reviewed.    Results for orders placed or performed in visit on 03/29/24   POCT Urinalysis, Dipstick, Automated, W/O Scope   Result Value Ref Range    POC Blood, Urine Positive (A) Negative    POC Bilirubin, Urine Positive (A) Negative    POC Urobilinogen, Urine 2.0 (A) 0.1 - 1.1    POC Ketones, Urine Negative Negative    POC Protein, Urine Positive (A) Negative    POC Nitrates, Urine Negative Negative    POC Glucose, Urine Negative Negative    pH, UA 7.0 5 - 8    POC Specific Gravity, Urine 1.020 1.003 - 1.029    POC Leukocytes, Urine Positive (A) Negative   POCT urine pregnancy   Result Value Ref Range    POC Preg Test, Ur Negative Negative     Acceptable Yes     No results found.     Assessment:     1. Dysuria    2. Urinary tract infection with hematuria, site unspecified        Plan:       Dysuria  -     POCT Urinalysis, Dipstick, Automated, W/O Scope  -     POCT urine pregnancy  -     phenazopyridine (PYRIDIUM) 100 MG tablet; Take 1 tablet (100 mg total) by mouth 3 (three) times daily as needed for Pain.  Dispense: 10 tablet; Refill: 1    Urinary tract infection with hematuria, site unspecified  -     nitrofurantoin, macrocrystal-monohydrate, (MACROBID) 100 MG capsule; Take 1 capsule (100 mg total) by mouth 2 (two) times daily.  Dispense: 14 capsule; Refill: 0      Follow up if symptoms worsen or fail to improve, for F/U with PCP or ED. There are no Patient Instructions on file for this visit.

## 2024-05-05 DIAGNOSIS — G47.00 INSOMNIA, UNSPECIFIED TYPE: ICD-10-CM

## 2024-05-05 RX ORDER — TRAZODONE HYDROCHLORIDE 100 MG/1
100 TABLET ORAL NIGHTLY
Qty: 90 TABLET | Refills: 2 | Status: SHIPPED | OUTPATIENT
Start: 2024-05-05

## 2024-05-05 NOTE — TELEPHONE ENCOUNTER
Refill Decision Note   Michelle Messer  is requesting a refill authorization.  Brief Assessment and Rationale for Refill:  Approve     Medication Therapy Plan:        Comments:     Note composed:10:05 AM 05/05/2024

## 2024-05-05 NOTE — TELEPHONE ENCOUNTER
No care due was identified.  [facility] Embedded Care Due Messages. Reference number: [id number].   5/05/2024 9:57:51 AM CDT

## 2024-05-28 ENCOUNTER — PATIENT MESSAGE (OUTPATIENT)
Dept: OBSTETRICS AND GYNECOLOGY | Facility: CLINIC | Age: 39
End: 2024-05-28
Payer: COMMERCIAL

## 2024-05-28 DIAGNOSIS — I10 BENIGN HYPERTENSION: Primary | ICD-10-CM

## 2024-05-29 RX ORDER — LABETALOL 200 MG/1
200 TABLET, FILM COATED ORAL 2 TIMES DAILY
Qty: 60 TABLET | Refills: 11 | Status: SHIPPED | OUTPATIENT
Start: 2024-05-29 | End: 2025-05-29

## 2024-05-30 RX ORDER — NIFEDIPINE 60 MG/1
60 TABLET, EXTENDED RELEASE ORAL 2 TIMES DAILY
Qty: 90 TABLET | Refills: 3 | Status: SHIPPED | OUTPATIENT
Start: 2024-05-30 | End: 2025-05-30

## 2024-07-01 ENCOUNTER — TELEPHONE (OUTPATIENT)
Dept: SLEEP MEDICINE | Facility: CLINIC | Age: 39
End: 2024-07-01
Payer: COMMERCIAL

## 2024-07-01 ENCOUNTER — OFFICE VISIT (OUTPATIENT)
Dept: CARDIOLOGY | Facility: CLINIC | Age: 39
End: 2024-07-01
Payer: COMMERCIAL

## 2024-07-01 ENCOUNTER — PATIENT MESSAGE (OUTPATIENT)
Dept: SLEEP MEDICINE | Facility: CLINIC | Age: 39
End: 2024-07-01
Payer: COMMERCIAL

## 2024-07-01 VITALS
SYSTOLIC BLOOD PRESSURE: 106 MMHG | BODY MASS INDEX: 43.45 KG/M2 | DIASTOLIC BLOOD PRESSURE: 67 MMHG | OXYGEN SATURATION: 98 % | HEIGHT: 67 IN | HEART RATE: 75 BPM | WEIGHT: 276.81 LBS

## 2024-07-01 DIAGNOSIS — I10 BENIGN HYPERTENSION: Primary | ICD-10-CM

## 2024-07-01 DIAGNOSIS — Z91.89 AT RISK FOR OBSTRUCTIVE SLEEP APNEA: ICD-10-CM

## 2024-07-01 DIAGNOSIS — E66.01 CLASS 3 SEVERE OBESITY DUE TO EXCESS CALORIES WITH SERIOUS COMORBIDITY AND BODY MASS INDEX (BMI) OF 40.0 TO 44.9 IN ADULT: ICD-10-CM

## 2024-07-01 DIAGNOSIS — J45.20 MILD INTERMITTENT ASTHMA WITHOUT COMPLICATION: ICD-10-CM

## 2024-07-01 DIAGNOSIS — E78.2 MIXED HYPERLIPIDEMIA: ICD-10-CM

## 2024-07-01 PROCEDURE — 3078F DIAST BP <80 MM HG: CPT | Mod: CPTII,S$GLB,,

## 2024-07-01 PROCEDURE — 3008F BODY MASS INDEX DOCD: CPT | Mod: CPTII,S$GLB,,

## 2024-07-01 PROCEDURE — 99204 OFFICE O/P NEW MOD 45 MIN: CPT | Mod: S$GLB,,,

## 2024-07-01 PROCEDURE — 99999 PR PBB SHADOW E&M-EST. PATIENT-LVL IV: CPT | Mod: PBBFAC,,,

## 2024-07-01 PROCEDURE — 1159F MED LIST DOCD IN RCRD: CPT | Mod: CPTII,S$GLB,,

## 2024-07-01 PROCEDURE — 3074F SYST BP LT 130 MM HG: CPT | Mod: CPTII,S$GLB,,

## 2024-07-01 PROCEDURE — 1160F RVW MEDS BY RX/DR IN RCRD: CPT | Mod: CPTII,S$GLB,,

## 2024-07-01 RX ORDER — TIRZEPATIDE 2.5 MG/.5ML
2.5 INJECTION, SOLUTION SUBCUTANEOUS WEEKLY
COMMUNITY
Start: 2024-06-04

## 2024-07-01 NOTE — ASSESSMENT & PLAN NOTE
Body mass index is 43.35 kg/m². Morbid obesity complicates all aspects of disease management from diagnostic modalities to treatment. Weight loss encouraged and health benefits explained to patient.   -currently on Mounjaro

## 2024-07-01 NOTE — TELEPHONE ENCOUNTER
----- Message from Martha Byrne sent at 7/1/2024 12:15 PM CDT -----  Contact: Rios  Type:  Patient Call          Who Called: patient         Does the patient know what this is regarding?: Requesting a call back abut having a appt scheduled;pt missed a call earlier ; please advise           Would the patient rather a call back or a response via [facility]?call           Best Call Back Number: [phone]             Additional Information:

## 2024-07-01 NOTE — ASSESSMENT & PLAN NOTE
-Goal BP < 130/80  -improved 106/67  -continue medical therapy- labetalol 200 mg (BID), nifedipine 60 mg (BID)   -discussed lifestyle modifications

## 2024-07-01 NOTE — PROGRESS NOTES
Subjective:    Patient ID:  Michelle Messer is a 38 y.o. female who presents for evaluation of No chief complaint on file.      PCP: Paul Aguilar MD     Referring Provider: Tyaa Mclean MD    HPI: patient is a 37 yo F w/PMH of HTN, obesity, asthma who presents today to establish care and HTN management. She was previously on lisinopril but was changed to labetalol with pregnancy and reports difficult controlling her blood pressure. Patient denies CP, SOB, palpitations, orthopnea, PND, presyncope, LOC, swelling, or claudication. She notes snoring, denies PND.   Patient monitors home BP and is enrolled in digital medicine BP ranges 140-150s/90s-100, improvement w medication adjustment 106/67. She was also started on Procardia 60 mg BID and labetalol 200 mg BID. She is currently on Mounjrao for weight loss management. Patient reports medication compliance without side effects. Patient does exercise regularly and walks daily.     Past Medical History:   Diagnosis Date    Allergy     Asthma     Eczema     Hiatal hernia     Hypertension      Past Surgical History:   Procedure Laterality Date    LAPAROSCOPY  2022    endometriosis    TONSILLECTOMY       Social History     Socioeconomic History    Marital status:    Tobacco Use    Smoking status: Former     Current packs/day: 0.00     Types: Cigarettes     Quit date: 4/10/2018     Years since quittin.2    Smokeless tobacco: Never   Substance and Sexual Activity    Alcohol use: Not Currently    Drug use: No    Sexual activity: Yes     Partners: Male     Birth control/protection: None   Social History Narrative    Tobacco: Intermittent use; once per month has 7 cigarettes; Used ages 20 yo-34 yo; max use 0.5 ppd    EtOH: Twice per week, 2 drinks at one time    Drug: None    Employment: Teacher ()     Education: Bachelor's     Lives with  and 10 yo step-son      Social Determinants of Health     Financial Resource Strain: Low  Risk  (6/28/2024)    Overall Financial Resource Strain (CARDIA)     Difficulty of Paying Living Expenses: Not very hard   Food Insecurity: No Food Insecurity (6/28/2024)    Hunger Vital Sign     Worried About Running Out of Food in the Last Year: Never true     Ran Out of Food in the Last Year: Never true   Transportation Needs: No Transportation Needs (1/25/2022)    PRAPARE - Transportation     Lack of Transportation (Medical): No     Lack of Transportation (Non-Medical): No   Physical Activity: Insufficiently Active (6/28/2024)    Exercise Vital Sign     Days of Exercise per Week: 1 day     Minutes of Exercise per Session: 10 min   Stress: Stress Concern Present (6/28/2024)    [employer] [place] [employer] - Occupational Stress Questionnaire     Feeling of Stress : To some extent   Housing Stability: Low Risk  (1/25/2022)    Housing Stability Vital Sign     Unable to Pay for Housing in the Last Year: No     Number of Places Lived in the Last Year: 2     Unstable Housing in the Last Year: No     Family History   Problem Relation Name Age of Onset    Heart failure Paternal Grandmother      Heart disease Maternal Grandmother Lise Cazares     Colon cancer Maternal Grandfather      Leukemia Maternal Grandfather      Hypertension Father Gunner Melohn     Asthma Father Gunner Melohn     Allergies Father Gunner Mcdowellohn     Rheum arthritis Mother Sarita Mcdowellohn     Asthma Mother Sarita Maravillan     Heart failure Mother Sarita Maravillan     Hypertension Mother Sarita Melohn     Allergies Mother Sarita Mcdowellohn     Eczema Mother Sarita Maravillan     Breast cancer Neg Hx      Ovarian cancer Neg Hx      Allergic rhinitis Neg Hx      Angioedema Neg Hx      Atopy Neg Hx      Immunodeficiency Neg Hx      Rhinitis Neg Hx      Urticaria Neg Hx         Review of patient's allergies indicates:   Allergen Reactions    Erythromycin Rash       Medication List with Changes/Refills   Current Medications    ALBUTEROL (PROAIR HFA) 90 MCG/ACTUATION INHALER     Inhale 2 puffs into the lungs every 6 (six) hours as needed for Wheezing. Rescue    FLOVENT HFA 44 MCG/ACTUATION INHALER    INL 2 PFS PO BID    GABAPENTIN (NEURONTIN) 100 MG CAPSULE    Take 1 capsule (100 mg total) by mouth every evening.    LABETALOL (NORMODYNE) 200 MG TABLET    Take 1 tablet (200 mg total) by mouth 2 (two) times daily.    LETROZOLE (FEMARA) 2.5 MG TAB    TAKE 1 TABLET BY MOUTH DAILY ON DAYS 3 TO 7 OF CYCLE    LEVALBUTEROL (XOPENEX) 1.25 MG/3 ML NEBULIZER SOLUTION    Take 3 mLs by nebulization 3 (three) times daily.    MOUNJARO 2.5 MG/0.5 ML PNIJ    Inject 2.5 mg into the skin once a week.    MUPIROCIN (BACTROBAN) 2 % OINTMENT    Apply topically 3 (three) times daily. To folliculitis bumps    NIFEDIPINE (PROCARDIA XL) 60 MG (OSM) 24 HR TABLET    Take 1 tablet (60 mg total) by mouth 2 (two) times a day.    NITROFURANTOIN, MACROCRYSTAL-MONOHYDRATE, (MACROBID) 100 MG CAPSULE    Take 1 capsule (100 mg total) by mouth 2 (two) times daily.    PHENAZOPYRIDINE (PYRIDIUM) 100 MG TABLET    Take 1 tablet (100 mg total) by mouth 3 (three) times daily as needed for Pain.    TRAZODONE (DESYREL) 100 MG TABLET    TAKE 1 TABLET(100 MG) BY MOUTH EVERY EVENING   Discontinued Medications    DICLOFENAC SODIUM (VOLTAREN) 1 % GEL    2 g.    VENLAFAXINE (EFFEXOR-XR) 75 MG 24 HR CAPSULE    Take 1 capsule (75 mg total) by mouth once daily.    ZEPBOUND 5 MG/0.5 ML PNIJ    Inject into the skin.       Review of Systems   Constitutional: Negative for diaphoresis and fever.   HENT:  Negative for congestion and hearing loss.    Eyes:  Negative for blurred vision and pain.   Cardiovascular:  Negative for chest pain, claudication, dyspnea on exertion, leg swelling, near-syncope, palpitations and syncope.   Respiratory:  Negative for shortness of breath and sleep disturbances due to breathing.    Hematologic/Lymphatic: Negative for bleeding problem. Does not bruise/bleed easily.   Skin:  Negative for color change and poor wound  "healing.   Gastrointestinal:  Negative for abdominal pain and nausea.   Genitourinary:  Negative for bladder incontinence and flank pain.   Neurological:  Negative for focal weakness and light-headedness.        Objective:   /67 (BP Location: Left arm, Patient Position: Sitting, BP Method: Large (Automatic))   Pulse 75   Ht 5' 7" (1.702 m)   Wt 125.5 kg (276 lb 12.6 oz)   LMP 06/24/2024   SpO2 98%   BMI 43.35 kg/m²    Physical Exam  Constitutional:       Appearance: She is well-developed. She is not diaphoretic.   HENT:      Head: Normocephalic and atraumatic.   Eyes:      General: No scleral icterus.     Pupils: Pupils are equal, round, and reactive to light.   Neck:      Vascular: No JVD.   Cardiovascular:      Rate and Rhythm: Normal rate and regular rhythm.      Pulses: Intact distal pulses.           Radial pulses are 2+ on the right side and 2+ on the left side.        Dorsalis pedis pulses are 2+ on the right side and 2+ on the left side.        Posterior tibial pulses are 2+ on the right side and 2+ on the left side.      Heart sounds: S1 normal and S2 normal. No murmur heard.     No friction rub. No gallop.   Pulmonary:      Effort: Pulmonary effort is normal. No respiratory distress.      Breath sounds: Normal breath sounds. No wheezing or rales.   Chest:      Chest wall: No tenderness.   Abdominal:      General: Bowel sounds are normal. There is no distension.      Palpations: Abdomen is soft. There is no mass.      Tenderness: There is no abdominal tenderness. There is no rebound.   Musculoskeletal:         General: No tenderness. Normal range of motion.      Cervical back: Normal range of motion and neck supple.   Skin:     General: Skin is warm and dry.      Coloration: Skin is not pale.   Neurological:      Mental Status: She is alert and oriented to person, place, and time.      Coordination: Coordination normal.      Deep Tendon Reflexes: Reflexes normal.   Psychiatric:         Behavior: " Behavior normal.         Judgment: Judgment normal.           Assessment:       1. Benign hypertension    2. Mixed hyperlipidemia    3. Class 3 severe obesity due to excess calories with serious comorbidity and body mass index (BMI) of 40.0 to 44.9 in adult    4. Mild intermittent asthma without complication    5. At risk for obstructive sleep apnea         Plan:         Benign hypertension  -Goal BP < 130/80  -improved 106/67  -continue medical therapy- labetalol 200 mg (BID), nifedipine 60 mg (BID)   -discussed lifestyle modifications     Mixed hyperlipidemia  -.4 12/8/2023  -currently changed lifestyle modifications   -repeat Lipid panel in 6 months     Class 3 severe obesity with body mass index (BMI) of 40.0 to 44.9 in adult  Body mass index is 43.35 kg/m². Morbid obesity complicates all aspects of disease management from diagnostic modalities to treatment. Weight loss encouraged and health benefits explained to patient.   -currently on Mounjaro      Mild intermittent asthma without complication  -followed by PCP  -continue medical therapy     At risk for obstructive sleep apnea  -Refer to Sleep medicine for evaluation of MARCO ANTONIO       Total duration of face to face visit time 30 minutes.  Total time spent counseling greater than fifty percent of total visit time.  Counseling included discussion regarding imaging findings, diagnosis, possibilities, treatment options, risks and benefits.  The patient had many questions regarding the options and long-term effects      Antonino Meredith, [facility]  Cardiology

## 2024-08-15 ENCOUNTER — PATIENT MESSAGE (OUTPATIENT)
Dept: CARDIOLOGY | Facility: CLINIC | Age: 39
End: 2024-08-15
Payer: COMMERCIAL

## 2024-08-20 ENCOUNTER — OFFICE VISIT (OUTPATIENT)
Dept: CARDIOLOGY | Facility: CLINIC | Age: 39
End: 2024-08-20
Payer: COMMERCIAL

## 2024-08-20 VITALS
BODY MASS INDEX: 41.52 KG/M2 | HEIGHT: 67 IN | DIASTOLIC BLOOD PRESSURE: 82 MMHG | HEART RATE: 70 BPM | OXYGEN SATURATION: 98 % | WEIGHT: 264.56 LBS | SYSTOLIC BLOOD PRESSURE: 120 MMHG

## 2024-08-20 DIAGNOSIS — E66.01 CLASS 3 SEVERE OBESITY DUE TO EXCESS CALORIES WITH SERIOUS COMORBIDITY AND BODY MASS INDEX (BMI) OF 40.0 TO 44.9 IN ADULT: ICD-10-CM

## 2024-08-20 DIAGNOSIS — E78.2 MIXED HYPERLIPIDEMIA: ICD-10-CM

## 2024-08-20 DIAGNOSIS — J45.20 MILD INTERMITTENT ASTHMA WITHOUT COMPLICATION: ICD-10-CM

## 2024-08-20 DIAGNOSIS — Z91.89 AT RISK FOR OBSTRUCTIVE SLEEP APNEA: ICD-10-CM

## 2024-08-20 DIAGNOSIS — F33.0 DEPRESSION, MAJOR, RECURRENT, MILD: ICD-10-CM

## 2024-08-20 DIAGNOSIS — I10 BENIGN HYPERTENSION: Primary | ICD-10-CM

## 2024-08-20 PROCEDURE — 99999 PR PBB SHADOW E&M-EST. PATIENT-LVL III: CPT | Mod: PBBFAC,,,

## 2024-08-20 PROCEDURE — 3079F DIAST BP 80-89 MM HG: CPT | Mod: CPTII,S$GLB,,

## 2024-08-20 PROCEDURE — 3008F BODY MASS INDEX DOCD: CPT | Mod: CPTII,S$GLB,,

## 2024-08-20 PROCEDURE — 3074F SYST BP LT 130 MM HG: CPT | Mod: CPTII,S$GLB,,

## 2024-08-20 PROCEDURE — 99214 OFFICE O/P EST MOD 30 MIN: CPT | Mod: S$GLB,,,

## 2024-08-20 PROCEDURE — 1160F RVW MEDS BY RX/DR IN RCRD: CPT | Mod: CPTII,S$GLB,,

## 2024-08-20 PROCEDURE — 1159F MED LIST DOCD IN RCRD: CPT | Mod: CPTII,S$GLB,,

## 2024-08-20 RX ORDER — BUSPIRONE HYDROCHLORIDE 7.5 MG/1
7.5 TABLET ORAL 2 TIMES DAILY
COMMUNITY
Start: 2024-08-07

## 2024-08-20 RX ORDER — SERTRALINE HYDROCHLORIDE 100 MG/1
100 TABLET, FILM COATED ORAL
COMMUNITY
Start: 2024-08-06

## 2024-08-20 RX ORDER — TIRZEPATIDE 7.5 MG/.5ML
7.5 INJECTION, SOLUTION SUBCUTANEOUS
COMMUNITY

## 2024-08-20 RX ORDER — ATOMOXETINE 18 MG/1
CAPSULE ORAL
COMMUNITY
Start: 2024-08-09

## 2024-08-20 NOTE — PROGRESS NOTES
Subjective:    Patient ID:  Michelle Messer is a 38 y.o. female who presents for evaluation of No chief complaint on file.      PCP: Paul Aguilar MD     Referring Provider: Taya Mclean MD    HPI: patient is a 37 yo F w/PMH of HTN, obesity, asthma who presents  today for f/u appt. She was last seen on 24 to establish care and HTN management. She was continued on medical therapy, Procardia 60 mg BID and labetalol 200 mg BID and referred to sleep medicine.  Patient denies CP, SOB, palpitations, orthopnea, PND, presyncope, LOC, swelling, or claudication. She notes snoring, denies PND. Patient monitors home BP and is enrolled in digital medicine BP ranges 120-130/70s w medical therapy. She is currently on Mounjrao for weight loss management. Patient reports medication compliance without side effects. Patient does exercise regularly and walks daily.     Past Medical History:   Diagnosis Date    Allergy     Asthma     Eczema     Hiatal hernia     Hypertension      Past Surgical History:   Procedure Laterality Date    LAPAROSCOPY  2022    endometriosis    TONSILLECTOMY       Social History     Socioeconomic History    Marital status:    Tobacco Use    Smoking status: Former     Current packs/day: 0.00     Types: Cigarettes     Quit date: 4/10/2018     Years since quittin.3     Passive exposure: Never    Smokeless tobacco: Never   Substance and Sexual Activity    Alcohol use: Not Currently    Drug use: No    Sexual activity: Yes     Partners: Male     Birth control/protection: None   Social History Narrative    Tobacco: Intermittent use; once per month has 7 cigarettes; Used ages 18 yo-34 yo; max use 0.5 ppd    EtOH: Twice per week, 2 drinks at one time    Drug: None    Employment: Teacher ()     Education: Bachelor's     Lives with  and 10 yo step-son      Social Determinants of Health     Financial Resource Strain: Low Risk  (2024)    Overall Financial Resource  Strain (CARDIA)     Difficulty of Paying Living Expenses: Not very hard   Food Insecurity: No Food Insecurity (6/28/2024)    Hunger Vital Sign     Worried About Running Out of Food in the Last Year: Never true     Ran Out of Food in the Last Year: Never true   Transportation Needs: No Transportation Needs (1/25/2022)    PRAPARE - Transportation     Lack of Transportation (Medical): No     Lack of Transportation (Non-Medical): No   Physical Activity: Insufficiently Active (6/28/2024)    Exercise Vital Sign     Days of Exercise per Week: 1 day     Minutes of Exercise per Session: 10 min   Stress: Stress Concern Present (6/28/2024)    [employer] [place] [employer] - Occupational Stress Questionnaire     Feeling of Stress : To some extent   Housing Stability: Low Risk  (1/25/2022)    Housing Stability Vital Sign     Unable to Pay for Housing in the Last Year: No     Number of Places Lived in the Last Year: 2     Unstable Housing in the Last Year: No     Family History   Problem Relation Name Age of Onset    Heart failure Paternal Grandmother      Heart disease Maternal Grandmother Lise Cazares     Colon cancer Maternal Grandfather      Leukemia Maternal Grandfather      Hypertension Father Gunner Melohn     Asthma Father Gunner Melohn     Allergies Father Gunner Mcdowellohn     Rheum arthritis Mother Sarita Maravillan     Asthma Mother Sarita Rutledge     Heart failure Mother Sarita Maravillan     Hypertension Mother Sarita Mcdowellohn     Allergies Mother Sarita Mcdowellohn     Eczema Mother Sarita Maravillan     Breast cancer Neg Hx      Ovarian cancer Neg Hx      Allergic rhinitis Neg Hx      Angioedema Neg Hx      Atopy Neg Hx      Immunodeficiency Neg Hx      Rhinitis Neg Hx      Urticaria Neg Hx         Review of patient's allergies indicates:   Allergen Reactions    Erythromycin Rash       Medication List with Changes/Refills   Current Medications    ALBUTEROL (PROAIR HFA) 90 MCG/ACTUATION INHALER    Inhale 2 puffs into the lungs every 6 (six)  hours as needed for Wheezing. Rescue    ATOMOXETINE (STRATTERA) 18 MG CAPSULE    1 capsule in the morning Orally Once a day for 30 days    BUSPIRONE (BUSPAR) 7.5 MG TABLET    Take 7.5 mg by mouth 2 (two) times daily.    FLOVENT HFA 44 MCG/ACTUATION INHALER    INL 2 PFS PO BID    GABAPENTIN (NEURONTIN) 100 MG CAPSULE    Take 1 capsule (100 mg total) by mouth every evening.    LABETALOL (NORMODYNE) 200 MG TABLET    Take 1 tablet (200 mg total) by mouth 2 (two) times daily.    LETROZOLE (FEMARA) 2.5 MG TAB    TAKE 1 TABLET BY MOUTH DAILY ON DAYS 3 TO 7 OF CYCLE    LEVALBUTEROL (XOPENEX) 1.25 MG/3 ML NEBULIZER SOLUTION    Take 3 mLs by nebulization 3 (three) times daily.    MUPIROCIN (BACTROBAN) 2 % OINTMENT    Apply topically 3 (three) times daily. To folliculitis bumps    NIFEDIPINE (PROCARDIA XL) 60 MG (OSM) 24 HR TABLET    Take 1 tablet (60 mg total) by mouth 2 (two) times a day.    NITROFURANTOIN, MACROCRYSTAL-MONOHYDRATE, (MACROBID) 100 MG CAPSULE    Take 1 capsule (100 mg total) by mouth 2 (two) times daily.    PHENAZOPYRIDINE (PYRIDIUM) 100 MG TABLET    Take 1 tablet (100 mg total) by mouth 3 (three) times daily as needed for Pain.    SERTRALINE (ZOLOFT) 100 MG TABLET    Take 100 mg by mouth.    TIRZEPATIDE (MOUNJARO) 7.5 MG/0.5 ML PNIJ    Inject 7.5 mg into the skin every 7 days.    TRAZODONE (DESYREL) 100 MG TABLET    TAKE 1 TABLET(100 MG) BY MOUTH EVERY EVENING   Discontinued Medications    MOUNJARO 2.5 MG/0.5 ML PNIJ    Inject 2.5 mg into the skin once a week.       Review of Systems   Constitutional: Negative for diaphoresis and fever.   HENT:  Negative for congestion and hearing loss.    Eyes:  Negative for blurred vision and pain.   Cardiovascular:  Negative for chest pain, claudication, dyspnea on exertion, leg swelling, near-syncope, palpitations and syncope.   Respiratory:  Negative for shortness of breath and sleep disturbances due to breathing.    Hematologic/Lymphatic: Negative for bleeding problem.  "Does not bruise/bleed easily.   Skin:  Negative for color change and poor wound healing.   Gastrointestinal:  Negative for abdominal pain and nausea.   Genitourinary:  Negative for bladder incontinence and flank pain.   Neurological:  Negative for focal weakness and light-headedness.        Objective:   /82 (BP Location: Right arm, Patient Position: Sitting, BP Method: Large (Manual))   Pulse 70   Ht 5' 7" (1.702 m)   Wt 120 kg (264 lb 8.8 oz)   LMP 08/02/2024   SpO2 98%   BMI 41.43 kg/m²    Physical Exam  Constitutional:       Appearance: She is well-developed. She is not diaphoretic.   HENT:      Head: Normocephalic and atraumatic.   Eyes:      General: No scleral icterus.     Pupils: Pupils are equal, round, and reactive to light.   Neck:      Vascular: No JVD.   Cardiovascular:      Rate and Rhythm: Normal rate and regular rhythm.      Pulses: Intact distal pulses.           Radial pulses are 2+ on the right side and 2+ on the left side.        Dorsalis pedis pulses are 2+ on the right side and 2+ on the left side.        Posterior tibial pulses are 2+ on the right side and 2+ on the left side.      Heart sounds: S1 normal and S2 normal. No murmur heard.     No friction rub. No gallop.   Pulmonary:      Effort: Pulmonary effort is normal. No respiratory distress.      Breath sounds: Normal breath sounds. No wheezing or rales.   Chest:      Chest wall: No tenderness.   Abdominal:      General: Bowel sounds are normal. There is no distension.      Palpations: Abdomen is soft. There is no mass.      Tenderness: There is no abdominal tenderness. There is no rebound.   Musculoskeletal:         General: No tenderness. Normal range of motion.      Cervical back: Normal range of motion and neck supple.   Skin:     General: Skin is warm and dry.      Coloration: Skin is not pale.   Neurological:      Mental Status: She is alert and oriented to person, place, and time.      Coordination: Coordination normal. "      Deep Tendon Reflexes: Reflexes normal.   Psychiatric:         Behavior: Behavior normal.         Judgment: Judgment normal.           Assessment:       1. Benign hypertension    2. Mixed hyperlipidemia    3. Mild intermittent asthma without complication    4. At risk for obstructive sleep apnea    5. Class 3 severe obesity due to excess calories with serious comorbidity and body mass index (BMI) of 40.0 to 44.9 in adult    6. Depression, major, recurrent, mild         Plan:         Benign hypertension  -Goal BP < 130/80  -controlled   -continue medical therapy- labetalol 200 mg (BID), nifedipine 60 mg (BID)   -discussed lifestyle modifications     Mixed hyperlipidemia  -.4 12/8/2023  -currently changed lifestyle modifications   -repeat Lipid panel in 6 months     Mild intermittent asthma without complication  -followed by PCP  -continue medical therapy     At risk for obstructive sleep apnea  -Refer to Sleep medicine for evaluation of MARCO ANTONIO - appt scheduled for Nov. 1st     Class 3 severe obesity with body mass index (BMI) of 40.0 to 44.9 in adult  Body mass index is 41.43 kg/m². Morbid obesity complicates all aspects of disease management from diagnostic modalities to treatment. Weight loss encouraged and health benefits explained to patient.   -currently on Mounjaro and doing well   -she has lost 43 pounds     Depression, major, recurrent, mild  -Followed by PCP      Total duration of face to face visit time 30 minutes.  Total time spent counseling greater than fifty percent of total visit time.  Counseling included discussion regarding imaging findings, diagnosis, possibilities, treatment options, risks and benefits.  The patient had many questions regarding the options and long-term effects      Antonino Meredith, [facility]  Cardiology

## 2024-08-20 NOTE — ASSESSMENT & PLAN NOTE
Body mass index is 41.43 kg/m². Morbid obesity complicates all aspects of disease management from diagnostic modalities to treatment. Weight loss encouraged and health benefits explained to patient.   -currently on Mounjaro and doing well   -she has lost 43 pounds

## 2024-08-20 NOTE — ASSESSMENT & PLAN NOTE
-Goal BP < 130/80  -controlled   -continue medical therapy- labetalol 200 mg (BID), nifedipine 60 mg (BID)   -discussed lifestyle modifications

## 2024-11-01 ENCOUNTER — OFFICE VISIT (OUTPATIENT)
Dept: SLEEP MEDICINE | Facility: CLINIC | Age: 39
End: 2024-11-01
Payer: COMMERCIAL

## 2024-11-01 VITALS
WEIGHT: 252.13 LBS | DIASTOLIC BLOOD PRESSURE: 80 MMHG | BODY MASS INDEX: 39.57 KG/M2 | SYSTOLIC BLOOD PRESSURE: 127 MMHG | HEIGHT: 67 IN | HEART RATE: 78 BPM

## 2024-11-01 DIAGNOSIS — G47.30 SLEEP APNEA, UNSPECIFIED TYPE: Primary | ICD-10-CM

## 2024-11-01 DIAGNOSIS — I10 BENIGN HYPERTENSION: ICD-10-CM

## 2024-11-01 DIAGNOSIS — Z91.89 AT RISK FOR OBSTRUCTIVE SLEEP APNEA: ICD-10-CM

## 2024-11-01 PROCEDURE — 99999 PR PBB SHADOW E&M-EST. PATIENT-LVL III: CPT | Mod: PBBFAC,,, | Performed by: NURSE PRACTITIONER

## 2024-11-01 RX ORDER — MIRTAZAPINE 7.5 MG/1
7.5 TABLET, FILM COATED ORAL NIGHTLY
COMMUNITY

## 2024-11-01 RX ORDER — ATOMOXETINE 60 MG/1
60 CAPSULE ORAL DAILY
COMMUNITY

## 2024-11-13 RX ORDER — NIFEDIPINE 60 MG/1
TABLET, EXTENDED RELEASE ORAL
Qty: 90 TABLET | Refills: 1 | Status: SHIPPED | OUTPATIENT
Start: 2024-11-13

## 2024-11-13 RX ORDER — GABAPENTIN 100 MG/1
100 CAPSULE ORAL
Qty: 30 CAPSULE | Refills: 11 | Status: SHIPPED | OUTPATIENT
Start: 2024-11-13

## 2024-11-13 NOTE — TELEPHONE ENCOUNTER
Refill Decision Note   Michelle Messer  is requesting a refill authorization.  Brief Assessment and Rationale for Refill:  Approve     Medication Therapy Plan:         Comments:     Note composed:12:41 PM 11/13/2024

## 2024-11-21 ENCOUNTER — TELEPHONE (OUTPATIENT)
Dept: SLEEP MEDICINE | Facility: OTHER | Age: 39
End: 2024-11-21
Payer: COMMERCIAL

## 2024-11-22 DIAGNOSIS — M79.671 BILATERAL FOOT PAIN: Primary | ICD-10-CM

## 2024-11-22 DIAGNOSIS — M79.672 BILATERAL FOOT PAIN: Primary | ICD-10-CM

## 2024-11-24 ENCOUNTER — PATIENT MESSAGE (OUTPATIENT)
Dept: ADMINISTRATIVE | Facility: OTHER | Age: 39
End: 2024-11-24
Payer: COMMERCIAL

## 2024-11-25 ENCOUNTER — OFFICE VISIT (OUTPATIENT)
Dept: PODIATRY | Facility: CLINIC | Age: 39
End: 2024-11-25
Payer: COMMERCIAL

## 2024-11-25 VITALS — HEIGHT: 67 IN | WEIGHT: 248.88 LBS | BODY MASS INDEX: 39.06 KG/M2 | RESPIRATION RATE: 18 BRPM

## 2024-11-25 DIAGNOSIS — M72.2 PLANTAR FASCIITIS OF RIGHT FOOT: Primary | ICD-10-CM

## 2024-11-25 DIAGNOSIS — R26.9 IMPAIRED GAIT: ICD-10-CM

## 2024-11-25 DIAGNOSIS — M21.869 ACQUIRED POSTERIOR EQUINUS, UNSPECIFIED LATERALITY: ICD-10-CM

## 2024-11-25 PROCEDURE — 1159F MED LIST DOCD IN RCRD: CPT | Mod: CPTII,S$GLB,, | Performed by: PODIATRIST

## 2024-11-25 PROCEDURE — 3008F BODY MASS INDEX DOCD: CPT | Mod: CPTII,S$GLB,, | Performed by: PODIATRIST

## 2024-11-25 PROCEDURE — 99203 OFFICE O/P NEW LOW 30 MIN: CPT | Mod: S$GLB,,, | Performed by: PODIATRIST

## 2024-11-25 PROCEDURE — 99999 PR PBB SHADOW E&M-EST. PATIENT-LVL IV: CPT | Mod: PBBFAC,,, | Performed by: PODIATRIST

## 2024-11-25 NOTE — PROGRESS NOTES
[facility] - PODIATRY  [address]  [address]  [place] [address]  Dept: [phone]  Dept Fax: [phone]    Fausto Bo Jr., DPM     Assessment:   MDM    Coding  1. Plantar fasciitis of right foot  Ambulatory referral/consult to Physical/Occupational Therapy      2. Impaired gait        3. Acquired posterior equinus, unspecified laterality  Ambulatory referral/consult to Physical/Occupational Therapy          Plan:     Procedures    Michelle was seen today for heel pain.    Diagnoses and all orders for this visit:    Plantar fasciitis of right foot  -     Ambulatory referral/consult to Physical/Occupational Therapy; Future    Impaired gait    Acquired posterior equinus, unspecified laterality  -     Ambulatory referral/consult to Physical/Occupational Therapy; Future        -pt seen, evaluated, and managed  -dx discussed in detail. All questions/concerns addressed  -all tx options discussed. All alternatives, risks, benefits of all txs discussed  -the patient was educated about the diagnosis and discussed reducing caloric intake, increase physical activity  -We discussed conservative care options possible including but not limited to shoe wear and/or padding, bracing/strapping, at home ROM, formal PT, medical therapy, injection therapy  - The utilization of NSAIDs can be considered but their benefit has to be tempered against the risk of GI/ concerns  - A steroid injection can be undertaken.  We did discuss the potential mechanism of action of this shot.  Understanding that multiple injections at the same anatomic site do have deleterious effects on the soft tissue.  Generic risks include: steroid flare (advised to ice if necessary), skin hypo-pgimentation (which can be permanent and unsightly), elevation of blood sugar, subcutaneous atrophy (can be permanent) and infection.   -XR/imaging reviewed by me: agree with read  -labs reviewed by me: ok for rx  nsaid  -implemented icing/stretching regimen  -heel lifts dispensed        -rxs dispensed: none  -referrals: PT  -WB: wbat      Follow up in about 8 weeks (around 1/20/2025).    Subjective:      Patient ID: Michelle Messer is a 39 y.o. female.    Chief Complaint:   Chief Complaint   Patient presents with    Heel Pain     Right        CC - foot pain: patient presents to the podiatry clinic  with complaint of  right foot pain. Onset of the symptoms was several months ago. Precipitating event: unk. Current symptoms include: ability to bear weight, but with some pain, nilda the heel, swelling and worsening symptoms after a period of inactivity. Aggravating factors: walking and certain shoegear. Symptoms have gradually worsened. Patient has had no prior foot problems. Evaluation to date: none. Treatment to date: avoidance of offending activity, ice, OTC analgesics which are not very effective, PT which was somewhat effective, and rest. Patients rates pain 7/10 on pain scale.      HPI    Last Podiatry Enc: Visit date not found  Last Enc w/ Me: Visit date not found    Outside reports reviewed: historical medical records.  Family hx: as below  Past Medical History:   Diagnosis Date    Allergy     Asthma     Eczema     Hiatal hernia 2009    Hypertension      Past Surgical History:   Procedure Laterality Date    LAPAROSCOPY  11/2022    endometriosis    TONSILLECTOMY       Family History   Problem Relation Name Age of Onset    Heart failure Paternal Grandmother      Heart disease Maternal Grandmother Lise Cazares     Colon cancer Maternal Grandfather      Leukemia Maternal Grandfather      Hypertension Father Gunner Melohn     Asthma Father Gunner Melohn     Allergies Father Gunner Mcdowellohn     Rheum arthritis Mother Sarita Mcdowellohn     Asthma Mother Sarita Mcdowellohn     Heart failure Mother Sarita Mcdowellohn     Hypertension Mother Sarita Mcdowellohn     Allergies Mother Sarita Melohn     Eczema Mother Sarita Mcdowellohn     Breast cancer Neg Hx      Ovarian cancer  Neg Hx      Allergic rhinitis Neg Hx      Angioedema Neg Hx      Atopy Neg Hx      Immunodeficiency Neg Hx      Rhinitis Neg Hx      Urticaria Neg Hx       Current Outpatient Medications   Medication Sig Dispense Refill    albuterol (PROAIR HFA) 90 mcg/actuation inhaler Inhale 2 puffs into the lungs every 6 (six) hours as needed for Wheezing. Rescue 18 g 2    atomoxetine (STRATTERA) 60 MG capsule Take 60 mg by mouth once daily.      busPIRone (BUSPAR) 7.5 MG tablet Take 7.5 mg by mouth 2 (two) times daily.      FLOVENT HFA 44 mcg/actuation inhaler INL 2 PFS PO BID 10.6 g 3    gabapentin (NEURONTIN) 100 MG capsule TAKE 1 CAPSULE(100 MG) BY MOUTH EVERY EVENING 30 capsule 11    labetaloL (NORMODYNE) 200 MG tablet Take 1 tablet (200 mg total) by mouth 2 (two) times daily. 60 tablet 11    levalbuterol (XOPENEX) 1.25 mg/3 mL nebulizer solution Take 3 mLs by nebulization 3 (three) times daily. 60 each 3    mirtazapine (REMERON) 7.5 MG Tab Take 7.5 mg by mouth every evening.      mupirocin (BACTROBAN) 2 % ointment Apply topically 3 (three) times daily. To folliculitis bumps 15 g 1    NIFEdipine (PROCARDIA-XL) 60 MG (OSM) 24 hr tablet TAKE 1 TABLET(60 MG) BY MOUTH TWICE DAILY 90 tablet 1    sertraline (ZOLOFT) 100 MG tablet Take 100 mg by mouth.      tirzepatide (MOUNJARO) 7.5 mg/0.5 mL PnIj Inject 7.5 mg into the skin every 7 days.       No current facility-administered medications for this visit.     Review of patient's allergies indicates:   Allergen Reactions    Erythromycin Rash     Social History     Socioeconomic History    Marital status:    Tobacco Use    Smoking status: Former     Current packs/day: 0.00     Types: Cigarettes     Quit date: 4/10/2018     Years since quittin.6     Passive exposure: Never    Smokeless tobacco: Never   Substance and Sexual Activity    Alcohol use: Not Currently    Drug use: No    Sexual activity: Yes     Partners: Male     Birth control/protection: None   Social History  Narrative    Tobacco: Intermittent use; once per month has 7 cigarettes; Used ages 18 yo-34 yo; max use 0.5 ppd    EtOH: Twice per week, 2 drinks at one time    Drug: None    Employment: Teacher ()     Education: Bachelor's     Lives with  and 10 yo step-son      Social Drivers of Health     Financial Resource Strain: Low Risk  (6/28/2024)    Overall Financial Resource Strain (CARDIA)     Difficulty of Paying Living Expenses: Not very hard   Food Insecurity: No Food Insecurity (6/28/2024)    Hunger Vital Sign     Worried About Running Out of Food in the Last Year: Never true     Ran Out of Food in the Last Year: Never true   Transportation Needs: No Transportation Needs (1/25/2022)    PRAPARE - Transportation     Lack of Transportation (Medical): No     Lack of Transportation (Non-Medical): No   Physical Activity: Insufficiently Active (6/28/2024)    Exercise Vital Sign     Days of Exercise per Week: 1 day     Minutes of Exercise per Session: 10 min   Stress: Stress Concern Present (6/28/2024)    [employer] [place] [employer] - Occupational Stress Questionnaire     Feeling of Stress : To some extent   Housing Stability: Low Risk  (1/25/2022)    Housing Stability Vital Sign     Unable to Pay for Housing in the Last Year: No     Number of Places Lived in the Last Year: 2     Unstable Housing in the Last Year: No       ROS    REVIEW OF SYSTEMS: Negative as documented below as well as positive findings in bold.       Constitutional  Respiratory  Gastrointestinal  Skin   - Fever - Cough - Heartburn - Rash   - Chills - Spit blood - Nausea - Itching   - Weight Loss - Shortness of breath - Vomiting - Nail pain   - Malaise/Fatigue - Wheezing - Abdominal Pain  Wound/Ulcer   - Weight Gain   - Blood in Stool  Poor wound healing       - Diarrhea          Cardiovascular  Genitourinary  Neurological  HEENT   - Chest Pain - Dysuria - Burning Sensation of feet - Headache   - Palpitations - Hematuria -  "Tingling / Paresthesia - Congestion   - Pain at night in legs - Flank Pain - Dizziness - Sore Throat   - Cramping   - Tremor - Blurred Vision   - Leg Swelling   - Sensory Change - Double Vision   - Dizzy when standing   - Speech Change - Eye Redness       - Focal Weakness - Dry Eyes       - Loss of Consciousness          Endocrine  Musculoskeletal  Psychiatric   - Cold intolerance - Muscle Pain - Depression   - Heat intolerance - Neck Pain - Insomnia   - Anemia - Joint Pain - Memory Loss   -  Easy bruising, bleeding - Heel pain - Anxiety      Toe Pain        Leg/Ankle/Foot Pain         Objective:     Resp 18   Ht 5' 7" (1.702 m)   Wt 112.9 kg (248 lb 14.4 oz)   BMI 38.98 kg/m²   Vitals:    11/25/24 0825   Resp: 18   Weight: 112.9 kg (248 lb 14.4 oz)   Height: 5' 7" (1.702 m)   PainSc:   4   PainLoc: Foot       Physical Exam    General Appearance:   Patient appears well developed, well nourished  Patient appears stated age    Psychiatric:   Patient is oriented to time, place, and person.  Patient has appropriate mood and affect    Neck:  Trachea Midline  No visible masses    Respiratory/Ears:  No distress or labored breathing.  Able to differentiate between normal talking voice and whisper.  Able to follow commands    Eyes:  Visual Acuity intact  Lids and conjunctivae normal. No discoloration noted.    Foot Exam  Physical Exam  Ortho Exam  Ortho/SPM Exam  Foot/Ankle Musculoskeletal Exam    R LE exam con't:  V:  DP 2/4, PT 2/4   CRT< 3s to all digits tested   Tibial and popliteal lymph nodes are w/o abnormality   Edema: absent, varicosities: absent    N:  Patient displays normal ankle reflexes   SILT in SP/DP/T/Destiny/Saph distributions    Ortho: +Motor EHL/FHL/TA/GA   equinus deformity present  There is moderate pain with palpation of R medial calcaneal tubercle  Compartments soft/compressible. No pain on passive stretch of big toe. No calf  Pain.    Derm:  skin intact, skin warm and dry, skin without ulcers or " lesions, skin without induration, nails normal, texture turgor well hydrated      Imaging / Labs:      No results found.      Note: This was dictated using a computer transcription program. Although proofread, it may contain computer transcription errors and phonetic errors. Other human proofreading errors may also exist. Corrections may be performed at a later time. Please contact us for any clarification if needed.    Fausto Bo DPM  [facility]

## 2024-11-25 NOTE — PATIENT INSTRUCTIONS
Heel Pain (Plantar Fasciitis)        Heel pain is most often caused by plantar fasciitis, a condition that is sometimes also called heel spur syndrome when a spur is present. Heel pain may also be due to other causes, such as a stress fracture, tendonitis, arthritis, nerve irritation or, rarely, a cyst.    Because there are several potential causes, it is important to have heel pain properly diagnosed. A foot and ankle surgeon is able to distinguish between all the possibilities and to determine the underlying source of your heel pain.    What Is Plantar Fasciitis?  Heel pain is often caused by plantar fasciitis  Plantar fasciitis is an inflammation of the band of tissue (the plantar fascia) that extends from the heel to the toes. In this condition, the fascia first becomes irritated and then inflamed, resulting in heel pain.    Causes  The most common cause of plantar fasciitis relates to faulty structure of the foot. For example, people who have problems with their arches, either overly flat feet or high-arched feet, are more prone to developing plantar fasciitis.    Wearing nonsupportive footwear on hard, flat surfaces puts abnormal strain on the plantar fascia and can also lead to plantar fasciitis. This is particularly evident when ones job requires long hours on the feet. Obesity and overuse may also contribute to plantar fasciitis.    Symptoms  The symptoms of plantar fasciitis are:    Pain on the bottom of the heel  Pain in the arch of the foot  Pain that is usually worse upon arising  Pain that increases over a period of months  Swelling on the bottom of the heel     People with plantar fasciitis often describe the pain as worse when they get up in the morning or after they have been sitting for long periods of time. After a few minutes of walking, the pain decreases because walking stretches the fascia. For some people, the pain subsides but returns after spending long periods of time on their  feet.    Diagnosis  To arrive at a diagnosis, the foot and ankle surgeon will obtain your medical history and examine your foot. Throughout this process, the surgeon rules out all possible causes for your heel pain other than plantar fasciitis.    In addition, diagnostic imaging studies, such as x-rays or other imaging modalities, may be used to distinguish the different types of heel pain. Sometimes heel spurs are found in patients with plantar fasciitis, but these are rarely a source of pain. When they are present, the condition may be diagnosed as plantar fasciitis/heel spur syndrome.    Nonsurgical Treatment  Treatment of plantar fasciitis begins with first-line strategies, which you can begin at home:    -Stretching exercises. Exercises that stretch out the calf muscles help ease pain and assist with recovery.  -Avoid going barefoot. When you walk without shoes, you put undue strain and stress on your plantar fascia.  -Ice. Putting an ice pack on your heel for 20 minutes several times a day helps reduce inflammation. Place a thin towel between the ice and your heel; do not apply ice directly to the skin.  -Limit activities. Cut down on extended physical activities to give your heel a rest.  -Shoe modifications. Wearing supportive shoes that have good arch support and a slightly raised heel reduces stress on the plantar fascia.  -Medications. Oral nonsteroidal anti-inflammatory drugs (NSAIDs), such as ibuprofen, may be recommended to reduce pain and inflammation.     If you still have pain after several weeks, see your foot and ankle surgeon, who may add one or more of these treatment approaches:    -Padding, taping and strapping. Placing pads in the shoe softens the impact of walking. Taping and strapping help support the foot and reduce strain on the fascia.  -Orthotic devices. Custom orthotic devices that fit into your shoe help correct the underlying structural abnormalities causing the plantar  fasciitis.  -Injection therapy. In some cases, corticosteroid injections are used to help reduce the inflammation and relieve pain.  -Removable walking cast. A removable walking cast may be used to keep your foot immobile for a few weeks to allow it to rest and heal.  -Night splint. Wearing a night splint allows you to maintain an extended stretch of the plantar fascia while sleeping. This may help reduce the morning pain experienced by some patients.  -Physical therapy. Exercises and other physical therapy measures may be used to help provide relief.     When Is Surgery Needed?  Although most patients with plantar fasciitis respond to nonsurgical treatment, a small percentage of patients may require surgery. If, after several months of nonsurgical treatment, you continue to have heel pain, surgery will be considered. Your foot and ankle surgeon will discuss the surgical options with you and determine which approach would be most beneficial for you.    Long-Term Care  No matter what kind of treatment you undergo for plantar fasciitis, the underlying causes that led to this condition may remain. Therefore, you will need to continue with preventive measures. Wearing supportive shoes, stretching and using custom orthotic devices are the mainstay of long-term treatment for plantar fasciitis.            Understanding Heel Pain  Your heel is the back part of your foot. A band of tissue called the plantar fascia connects the heel bone to the bones in the ball of your foot. Nerves run from the heel up the inside of your ankle and into your leg. When you feel pain in the bottom of your heel, the plantar fascia may be inflamed. Overuse, Achilles tightness, or excess body weight can cause the tissue to tear or pull away from the bone. Sometimes the inflamed plantar fascia also irritates a nerve, causing more pain.    What causes heel pain?  Wearing shoes with poor cushioning can irritate the tissue in your heel (plantar  fascia). Being overweight or standing for long periods can also irritate the tissue. Running, walking, tennis, and other sports that put stress on the heels can cause tiny tears in the tissue. If your lower leg muscles are tight, this is more likely to occur. A tight Achilles tendon will also contribute to heel pain.  Symptoms  You may feel pain on the bottom or on the inside edge of your heel. The pain may be sharp when you get out of bed or when you stand up after sitting for a while. You may feel a dull ache in your heel after youve been standing for a long time on a hard surface. Running can also cause a dull ache.  Preventing future problems  To prevent future heel pain, wear shoes with well-cushioned heels. And do exercises prescribed by your healthcare provider to stretch the plantar fascia and the muscles in the lower leg.   Date Last Reviewed: 9/10/2015  © 8975-4960 [employer]. [address] [place] [address]. All rights reserved. This information is not intended as a substitute for professional medical care. Always follow your healthcare professional's instructions.      Treating Plantar Fasciitis  First, your healthcare provider tries to determine the cause of your problem in order to suggest ways to relieve pain. If your pain is due to poor foot mechanics, custom-made shoe inserts (orthoses) may help.    Reduce symptoms  To relieve mild symptoms, try aspirin, ibuprofen, or other medicines as directed. Rubbing ice on the affected area may also help.  To reduce severe pain and swelling, your healthcare provider may prescribe pills or injections or a walking cast in some instances. Physical therapy, such as ultrasound or a daily stretching program, may also be recommended. Surgery is rarely required.  To reduce symptoms caused by poor foot mechanics, your foot may be taped. This supports the arch and temporarily controls movement. Night splints may also help by stretching the  fascia.  Control movement  If taping helps, your healthcare provider may prescribe orthoses. Built from plaster casts of your feet, these inserts control the way your foot moves. As a result, your symptoms should go away.  Reduce overuse  Every time your foot strikes the ground, the plantar fascia is stretched. You can reduce the strain on the plantar fascia and the possibility of overuse by following these suggestions:  Lose any excess weight.  Avoid running on hard or uneven ground.  Use orthoses at all times in your shoes and house slippers.  If surgery is needed  Your healthcare provider may consider surgery if other types of treatment don't control your pain. During surgery, the plantar fascia is partially cut to release tension. As you heal, fibrous tissue fills the space between the heel bone and the plantar fascia.   Date Last Reviewed: 10/14/2015  © 1607-6968 [employer]. [address] [place] [address]. All rights reserved. This information is not intended as a substitute for professional medical care. Always follow your healthcare professional's instructions.      Equinus          What Is Equinus?    Equinus is a condition in which the upward bending motion of the ankle joint is limited. Someone with equinus lacks the flexibility to bring the top of the foot toward the front of the leg. Equinus can occur in one or both feet. When it involves both feet, the limitation of motion is sometimes worse in one foot than in the other.    People with equinus develop ways to compensate for their limited ankle motion, and this often leads to other foot, leg or back problems. The most common methods of compensation are flattening of the arch or picking up the heel early when walking, placing increased pressure on the ball of the foot. Other patients compensate by toe walking, while a smaller number take steps by bending abnormally at the hip or knee.    Causes  There are several possible causes  for the limited range of ankle motion. Often, it is due to tightness in the Achilles tendon or calf muscles (the soleus muscle and/or gastrocnemius muscle). In some patients, this tightness is congenital (present at birth), and sometimes it is an inherited trait. Other patients acquire the tightness from being in a cast, being on crutches or frequently wearing high-heeled shoes. In addition, diabetes can affect the fibers of the Achilles tendon and cause tightness. Sometimes equinus is related to a bone blocking the ankle motion. For example, a fragment of a broken bone following an ankle injury, or bone block, can get in the way and restrict motion. Equinus may also result from one leg being shorter than the other. Less often, equinus is caused by spasms in the calf muscle. These spasms may be signs of an underlying neurologic disorder.      Foot Problems Related to Equinus  Depending on how a patient compensates for the inability to bend properly at the ankle, a variety of foot conditions can develop, including:    Plantar fasciitis (arch/heel pain)  Calf cramping  Tendonitis (inflammation in the Achilles tendon)  Metatarsalgia (pain and/or callusing on the ball of the foot)  Flatfoot  Arthritis of the midfoot (middle area of the foot)  Pressure sores on the ball of the foot or the arch  Bunions and hammertoes  Ankle pain  Shin splints     Diagnosis  Most patients with equinus are unaware they have this condition when they first visit the doctor. Instead, they come to the doctor seeking relief for foot problems associated with equinus.    To diagnose equinus, the foot and ankle surgeon will evaluate the ankle's range of motion when the knee is flexed (bent) as well as extended (straightened). This enables the surgeon to identify whether the tendon or muscle is tight and to assess whether bone is interfering with ankle motion. X-rays may also be ordered. In some cases, the foot and ankle surgeon may refer the  patient for neurologic evaluation.    Nonsurgical Treatment  Treatment includes strategies aimed at relieving the symptoms and conditions associated with equinus. In addition, the patient is treated for the equinus itself through one or more of the following options:    Night splint. The foot may be placed in a splint at night to keep it in a position that helps reduce tightness of the calf muscle.  Heel lifts. Placing heel lifts inside the shoes or wearing shoes with a moderate heel takes stress off the Achilles tendon when walking and may reduce symptoms.  Arch supports or orthotic devices. Custom orthotic devices that fit into the shoe are often prescribed to keep weight distributed properly and to help control muscle/tendon imbalance.  Physical therapy. To help remedy muscle tightness, exercises that stretch the calf muscle(s) are recommended.     When Is Surgery Needed?  In some cases, surgery may be needed to correct the cause of equinus if it is related to a tight tendon or a bone blocking the ankle motion. The foot and ankle surgeon will determine the type of procedure that is best suited to the individual patient.                Ankle Dorsiflexion/Plantarflexion (Flexibility)    Sit on the floor or in bed with your legs straight in front of you.  Point both feet. Then flex both feet.  Do this 10 to 30 times in a row.  Repeat this exercise 2 times a day, or as instructed.  Date Last Reviewed: 5/1/2016 © 2000-2016 [employer]. [address] [place] [address]. All rights reserved. This information is not intended as a substitute for professional medical care. Always follow your healthcare professional's instructions.          Arch retraining    These exercises are for your right foot. Switch sides for your left foot.  Sit in a chair or stand with both feet flat on the floor. Press down with the ball of your right foot, but only on the left side of the foot, just under the big  toe.  Then pull the bottom of your big toe back toward your heel. This should pull up the arch of your foot. Dont flex your toes while doing this. It is a subtle movement of the arch.  Hold for 5 seconds. Relax.  Date Last Reviewed: 3/10/2016  © 8290-7157 [employer]. [address] [place] [address]. All rights reserved. This information is not intended as a substitute for professional medical care. Always follow your healthcare professional's instructions.        Soleus Stretch (Flexibility)    Stand facing a wall from 3 feet away. Take one step toward the wall with your right foot.  Place both palms on the wall. Bend both knees and lean forward. Keep both heels on the floor.  Hold for 30 to 60 seconds. Then relax both legs. Repeat the exercise 2 times.  Switch legs and repeat.  Repeat this exercise 3 times a day, or as instructed.     Tip: Dont bounce while youre stretching.   Date Last Reviewed: 3/10/2016  © 9266-6210 [employer]. [address] [place] [address]. All rights reserved. This information is not intended as a substitute for professional medical care. Always follow your healthcare professional's instructions.          Recommended OTC orthotics:  -powerstep  -superfeet    Recommended shoegear:  -new balance  -ascics  -olvin guerrero

## 2024-12-05 ENCOUNTER — CLINICAL SUPPORT (OUTPATIENT)
Dept: REHABILITATION | Facility: HOSPITAL | Age: 39
End: 2024-12-05
Attending: PODIATRIST
Payer: COMMERCIAL

## 2024-12-05 DIAGNOSIS — M21.869 ACQUIRED POSTERIOR EQUINUS, UNSPECIFIED LATERALITY: ICD-10-CM

## 2024-12-05 DIAGNOSIS — M72.2 PLANTAR FASCIITIS OF RIGHT FOOT: ICD-10-CM

## 2024-12-05 DIAGNOSIS — M79.671 PAIN OF RIGHT HEEL: Primary | ICD-10-CM

## 2024-12-05 PROCEDURE — 97161 PT EVAL LOW COMPLEX 20 MIN: CPT

## 2024-12-05 PROCEDURE — 97110 THERAPEUTIC EXERCISES: CPT

## 2024-12-05 NOTE — PLAN OF CARE
[facility]   Physical Therapy Initial Evaluation      Name: Michelle Messer  Clinic Number: [id number]    Therapy Diagnosis:   Encounter Diagnoses   Name Primary?    Plantar fasciitis of right foot     Acquired posterior equinus, unspecified laterality     Pain of right heel Yes        Physician: Fasuto Bo Jr., *    Physician Orders: PT Eval and Treat   Medical Diagnosis from Referral:   M72.2 (ICD-10-CM) - Plantar fasciitis of right foot   M21.869 (ICD-10-CM) - Acquired posterior equinus, unspecified laterality     Evaluation Date: 12/5/2024  Authorization Period Expiration: 11/25/25  Plan of Care Expiration: 1/17/25  Progress Note Due: 1/5/25  Visit # / Visits authorized: 1/ 1   FOTO: 1/ 3    Precautions: Standard and asthma       Time In: 2:35 pm   Time Out: 3:09 pm   Total Billable Time: 34 minutes    Subjective     Date of onset: a couple of months    History of current condition - Michelle reports: that she has terrible pain in medial/plantar aspect of (R) heel. Pt stated that she has been experiencing this pain for a couple of months. Pt denies injury. Pt denies pain in (L) heel. Pt stated that she has insoles that she wears.     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, walking around   Prior Level of Function: independent   Current Level of Function: independent with report of pain when performing aggravating factors listed below     Pain:  Current 6/10, worst 8/10, best 3/10   Location: (R) medial/plantar aspect of (R) heel   Description: sharp, pressure   Aggravating Factors: any kind of pressure on (R) foot, walking, standing  Easing Factors: Ibuprofen     Patients goals: to be able to figure out a way to manage it or bring pain down     Medical History:   Past Medical History:    Diagnosis Date    Allergy     Asthma     Eczema     Hiatal hernia 2009    Hypertension        Surgical History:   Michelle Messer  has a past surgical history that includes Tonsillectomy and Laparoscopy (11/2022).    Medications:   Michelle has a current medication list which includes the following prescription(s): albuterol, atomoxetine, buspirone, flovent hfa, gabapentin, labetalol, levalbuterol, mirtazapine, mupirocin, nifedipine, sertraline, and mounjaro.    Allergies:   Review of patient's allergies indicates:   Allergen Reactions    Erythromycin Rash        Objective        Hip Right  Left  Pain/Dysfunction with Movement    AROM MMT AROM MMT    Flexion WFL 5/5 WFL 5/5    Extension WFL 4/5 WFL 4+/5    Abduction WFL 4/5 WFL 4/5       Knee Right  Left  Pain/Dysfunction with Movement    AROM MMT AROM MMT    Flexion WFL 5/5 WFL 5/5    Extension WFL 5/5 WFL 5/5      Ankle Right  Left  Pain/Dysfunction with Movement    AROM MMT AROM MMT    Plantarflexion 65 See below 65 See below    Dorsiflexion 5 5/5 5 5/5    Inversion 30 4+/5 30 4+/5    Eversion 15 4+/5 15 4+/5      SLS: (R) = 26 seconds, - decreased stability noted, (L) = 30 seconds ( test stopped at 30 seconds) - decreased stability noted    SL Heel Raise Test (performed to fatigue): (R) = 15  , (L) = 17     Intake Outcome Measure for FOTO Foot Survey    Therapist reviewed FOTO scores for Michelle Messer on 12/5/2024.   FOTO report - see Media section or FOTO account episode details.    Intake Score: 52         Treatment     Total Treatment time (time-based codes) separate from Evaluation: 10 minutes     Michelle received the treatments listed below:      therapeutic exercises to develop ROM and flexibility for 10 minutes including:  Dowel roll 2'   Plantar fascia stretch  Standing gastroc stretch   Standing soleus stretch       Patient Education and Home Exercises     Education provided:   - HEP - pt was instructed to stop performing particular therex if increases pain  - pt verbalized understanding    Written Home Exercises Provided: Yes. Exercises were reviewed and Michelle was able to demonstrate them prior to the end of the session.  Michelle demonstrated good  understanding of the education provided. See EMR under Patient Instructions for exercises provided during therapy sessions.    Assessment     Michelle is a 39 y.o. female referred to outpatient Physical Therapy with a medical diagnosis of Plantar fasciitis of right foot and Acquired posterior equinus, unspecified laterality. Patient presents with decreased (B) DF AROM, decreased LE MMT scores noted above, and decreased stability when performed (B) SLS. Pt will benefit from skilled PT.     Patient prognosis is Good.   Patient will benefit from skilled outpatient Physical Therapy to address the deficits stated above and in the chart below, provide patient /family education, and to maximize patientt's level of independence.     Plan of care discussed with patient: Yes  Patient's spiritual, cultural and educational needs considered and patient is agreeable to the plan of care and goals as stated below:     Anticipated Barriers for therapy: chronicity of pain     Medical Necessity is demonstrated by the following  History  Co-morbidities and personal factors that may impact the plan of care [] LOW: no personal factors / co-morbidities  [] MODERATE: 1-2 personal factors / co-morbidities  [x] HIGH: 3+ personal factors / co-morbidities    Moderate / High Support Documentation:   Co-morbidities affecting plan of care: HTN, standard, BMI    Personal Factors:   no deficits     Examination  Body Structures and Functions, activity limitations and participation restrictions that may impact the plan of care [] LOW: addressing 1-2 elements  [x] MODERATE: 3+ elements  [] HIGH: 4+ elements (please support below)    Moderate / High Support Documentation: ROM, strength, balance     Clinical Presentation [x] LOW: stable  [] MODERATE: Evolving  []  HIGH: Unstable     Decision Making/ Complexity Score: low       Goals:  Short Term Goals: 3 weeks   Pt will be compliant with HEP to supplement PT with decreasing pain and improving functional mobility  Pt will improve (B) DF AROM to 10 degrees in order to improve functional gait    Long Term Goals: 6 weeks   Pt will improve FOTO score to at least 61 in order to demo improved functional mobility  Pt will improve LE MMT scores by at least 1/3 grade where deficits noted in order to improve strength for functional tasks  Pt will perform 25 (B) SL heel raises in order to demo improved strength for functional tasks  Pt will report (R) heel pain </= 3/10 at worst in order to be able to perform ADLs with less difficulty   Plan     Plan of care Certification: 12/5/2024 to 1/17/25.    Outpatient Physical Therapy 2 times weekly for 6 weeks to include the following interventions: Gait Training, Manual Therapy, Moist Heat/ Ice, Neuromuscular Re-ed, Patient Education, Therapeutic Activities, Therapeutic Exercise, and IASTM, dry needling, and modalities prn .     Annamarie Julian, PT        Physician's Signature: _________________________________________ Date: ________________

## 2024-12-16 ENCOUNTER — CLINICAL SUPPORT (OUTPATIENT)
Dept: REHABILITATION | Facility: HOSPITAL | Age: 39
End: 2024-12-16
Payer: COMMERCIAL

## 2024-12-16 DIAGNOSIS — M79.671 PAIN OF RIGHT HEEL: Primary | ICD-10-CM

## 2024-12-16 PROCEDURE — 97110 THERAPEUTIC EXERCISES: CPT | Mod: CQ

## 2024-12-16 NOTE — PROGRESS NOTES
"[facility]   Physical Therapy Treatment Note      Name: Michelle Messer  Clinic Number: [id number]    Therapy Diagnosis: No diagnosis found.  Physician: Fausto Bo Jr., *    Visit Date: 12/16/2024    Physician Orders: PT Eval and Treat   Medical Diagnosis from Referral:   M72.2 (ICD-10-CM) - Plantar fasciitis of right foot   M21.869 (ICD-10-CM) - Acquired posterior equinus, unspecified laterality      Evaluation Date: 12/5/2024  Authorization Period Expiration: 11/25/25  Plan of Care Expiration: 1/17/25  Progress Note Due: 1/5/25  Visit # / Visits authorized: 1/ 1 1/12  FOTO: 1/ 3     Precautions: Standard and asthma      PTA Visit #: 1/5     Time In: 4:00 pm  Time Out: 4:46 pm  Total Billable Time: 46 minutes    Subjective     Pt reports: she was able to do the home exercise program without any issues.  She was compliant with home exercise program.  Response to previous treatment: last session was initial evaluation  Functional change: None at this time    Pain: 4/10  Location: right heel      Objective      Objective Measures updated at progress report unless specified.     Treatment     Michelle received the treatments listed below:      therapeutic exercises to develop strength, endurance, ROM, flexibility, posture, and core stabilization for 45 minutes including:  B gastroc str 3x30"  B soleus str 3x30"  PF stretch 3x30"  Thera bar rolling 2'  DL heel raises 2x10  Standing hip abd/ext 2x10  Seated heel slides 20x 5"   Toe yoga 20x (R)  Red banded ankle 4 way 20x    Patient Education and Home Exercises       Education provided:   - ice for pain    Written Home Exercises Provided: Patient instructed to cont prior HEP. Exercises were reviewed and Michelle was able to demonstrate them prior to the end of the session.  Michelle demonstrated good  understanding of the education provided. See EMR under Patient Instructions for exercises provided during therapy sessions    Assessment     Pt " tolerated first full tx of PT without any issues. Reviewed HEP with good understanding and implemented TE to address hip and ankle strength. Patient performed SLS with minimal sway and one episode of LOB. Progress as tolerated.    Michelle Is progressing well towards her goals.   Pt prognosis is Good.     Pt will continue to benefit from skilled outpatient physical therapy to address the deficits listed in the problem list box on initial evaluation, provide pt/family education and to maximize pt's level of independence in the home and community environment.     Pt's spiritual, cultural and educational needs considered and pt agreeable to plan of care and goals.     Anticipated barriers to physical therapy: chronicity of pain     Goals:   Short Term Goals: 3 weeks   Pt will be compliant with HEP to supplement PT with decreasing pain and improving functional mobility  Pt will improve (B) DF AROM to 10 degrees in order to improve functional gait     Long Term Goals: 6 weeks   Pt will improve FOTO score to at least 61 in order to demo improved functional mobility  Pt will improve LE MMT scores by at least 1/3 grade where deficits noted in order to improve strength for functional tasks  Pt will perform 25 (B) SL heel raises in order to demo improved strength for functional tasks  Pt will report (R) heel pain </= 3/10 at worst in order to be able to perform ADLs with less difficulty     Plan     Plan of care Certification: 12/5/2024 to 1/17/25.     Outpatient Physical Therapy 2 times weekly for 6 weeks to include the following interventions: Gait Training, Manual Therapy, Moist Heat/ Ice, Neuromuscular Re-ed, Patient Education, Therapeutic Activities, Therapeutic Exercise, and IASTM, dry needling, and modalities prn .     PT/PTA met face to face to discuss pt's treatment plan and progress towards established goals. Pt will be seen by a physical therapist minimally every 6th visit or every 30 days.      Cliff Salazar PTA

## 2024-12-18 ENCOUNTER — TELEPHONE (OUTPATIENT)
Dept: SLEEP MEDICINE | Facility: OTHER | Age: 39
End: 2024-12-18
Payer: COMMERCIAL

## 2024-12-23 ENCOUNTER — CLINICAL SUPPORT (OUTPATIENT)
Dept: REHABILITATION | Facility: HOSPITAL | Age: 39
End: 2024-12-23
Payer: COMMERCIAL

## 2024-12-23 DIAGNOSIS — M79.671 PAIN OF RIGHT HEEL: Primary | ICD-10-CM

## 2024-12-23 PROCEDURE — 97110 THERAPEUTIC EXERCISES: CPT | Mod: CQ

## 2024-12-23 PROCEDURE — 97140 MANUAL THERAPY 1/> REGIONS: CPT | Mod: CQ

## 2024-12-23 NOTE — PROGRESS NOTES
"[facility]   Physical Therapy Treatment Note      Name: Michelle Messer  Clinic Number: [id number]    Therapy Diagnosis:   Encounter Diagnosis   Name Primary?    Pain of right heel Yes     Physician: Fausto Bo Jr., *    Visit Date: 12/23/2024    Physician Orders: PT Eval and Treat   Medical Diagnosis from Referral:   M72.2 (ICD-10-CM) - Plantar fasciitis of right foot   M21.869 (ICD-10-CM) - Acquired posterior equinus, unspecified laterality      Evaluation Date: 12/5/2024  Authorization Period Expiration: 11/25/25  Plan of Care Expiration: 1/17/25  Progress Note Due: 1/5/25  Visit # / Visits authorized: 1/ 1 2/12  FOTO: 1/ 3     Precautions: Standard and asthma      PTA Visit #: 2/5     Time In: 10:00 am  Time Out: 10:46 am  Total Billable Time: 46 minutes    Subjective     Pt reports: that she is still having pain in her heel with no change in intensity.   She was compliant with home exercise program.  Response to previous treatment: no adverse effects   Functional change: None at this time    Pain: 4/10  Location: right heel      Objective      Objective Measures updated at progress report unless specified.     Treatment     Michelle received the treatments listed below:      therapeutic exercises to develop strength, endurance, ROM, flexibility, posture, and core stabilization for 37 minutes including:  B gastroc str 3x30"  B soleus str 3x30"  PF stretch 3x30"  Thera bar rolling 2'  DL heel raises 2x10  Standing hip abd/ext 2x10  Seated heel slides 20x 5"   Toe yoga 20x (R)  Red banded ankle 4 way 20x      manual therapy techniques:  were applied to the: right heel for 9 minutes, including:  IASTM right medial heel       Patient Education and Home Exercises       Education provided:   - ice for pain    Written Home Exercises Provided: Patient instructed to cont prior HEP. Exercises were reviewed and Michelle was able to demonstrate them prior to the end of the session.  Michelle " demonstrated good  understanding of the education provided. See EMR under Patient Instructions for exercises provided during therapy sessions    Assessment     Continued with established activities without adverse effects. Pt continued with difficulty performing toe yoga. Incorporated manual therapy techniques to address medial heel pain. Will monitor pt's response to manual therapy and determine further need during future sessions.     Michelle Is progressing well towards her goals.   Pt prognosis is Good.     Pt will continue to benefit from skilled outpatient physical therapy to address the deficits listed in the problem list box on initial evaluation, provide pt/family education and to maximize pt's level of independence in the home and community environment.     Pt's spiritual, cultural and educational needs considered and pt agreeable to plan of care and goals.     Anticipated barriers to physical therapy: chronicity of pain     Goals:   Short Term Goals: 3 weeks   Pt will be compliant with HEP to supplement PT with decreasing pain and improving functional mobility  Pt will improve (B) DF AROM to 10 degrees in order to improve functional gait     Long Term Goals: 6 weeks   Pt will improve FOTO score to at least 61 in order to demo improved functional mobility  Pt will improve LE MMT scores by at least 1/3 grade where deficits noted in order to improve strength for functional tasks  Pt will perform 25 (B) SL heel raises in order to demo improved strength for functional tasks  Pt will report (R) heel pain </= 3/10 at worst in order to be able to perform ADLs with less difficulty     Plan     Plan of care Certification: 12/5/2024 to 1/17/25.     Outpatient Physical Therapy 2 times weekly for 6 weeks to include the following interventions: Gait Training, Manual Therapy, Moist Heat/ Ice, Neuromuscular Re-ed, Patient Education, Therapeutic Activities, Therapeutic Exercise, and IASTM, dry needling, and modalities prn .      PT/PTA met face to face to discuss pt's treatment plan and progress towards established goals. Pt will be seen by a physical therapist minimally every 6th visit or every 30 days.      Chantal Camejo PTA

## 2024-12-26 ENCOUNTER — CLINICAL SUPPORT (OUTPATIENT)
Dept: REHABILITATION | Facility: HOSPITAL | Age: 39
End: 2024-12-26
Payer: COMMERCIAL

## 2024-12-26 DIAGNOSIS — M79.671 PAIN OF RIGHT HEEL: Primary | ICD-10-CM

## 2024-12-26 PROCEDURE — 97110 THERAPEUTIC EXERCISES: CPT | Mod: CQ

## 2024-12-26 PROCEDURE — 97112 NEUROMUSCULAR REEDUCATION: CPT | Mod: CQ

## 2024-12-26 NOTE — PROGRESS NOTES
"[facility]   Physical Therapy Treatment Note      Name: Michelle Messer  Clinic Number: [id number]    Therapy Diagnosis:   Encounter Diagnosis   Name Primary?    Pain of right heel Yes     Physician: Fausto Bo Jr., *    Visit Date: 12/26/2024    Physician Orders: PT Eval and Treat   Medical Diagnosis from Referral:   M72.2 (ICD-10-CM) - Plantar fasciitis of right foot   M21.869 (ICD-10-CM) - Acquired posterior equinus, unspecified laterality      Evaluation Date: 12/5/2024  Authorization Period Expiration: 11/25/25  Plan of Care Expiration: 1/17/25  Progress Note Due: 1/5/25  Visit # / Visits authorized: 1/ 1 3/12  FOTO: 1/ 3     Precautions: Standard and asthma      PTA Visit #: 3/5     Time In: 9:22 am  Time Out: 10:01 am  Total Billable Time: 39 minutes    Subjective     Pt reports: that she did get temp relief from last session. She walked over an hour trying to help a lost dog find its owner which did aggravate the foot more the next day.  She was compliant with home exercise program.  Response to previous treatment: no adverse effects   Functional change: None at this time    Pain: 4/10  Location: right heel      Objective      Objective Measures updated at progress report unless specified.     Treatment     Michelle received the treatments listed below:      therapeutic exercises to develop strength, endurance, ROM, flexibility, posture, and core stabilization for 31 minutes including:  B gastroc str 3x30"  B soleus str 3x30"  PF stretch 3x30"  Thera bar rolling 2'  DL heel raises 2x10  Standing hip abd/ext RTB 2x10  Seated heel slides 20x 5"   Toe yoga 20x (R)  Red banded ankle 4 way 20x    NEUROMUSCULAR RE-EDUCATION ACTIVITIES to improve Balance, Coordination, Kinesthetic, Sense, Proprioception, and Posture for 8 minutes.  The following were included:   Airex step ups B 20x  SLS on foam (R) 3x30"      manual therapy techniques:  were applied to the: right heel for 0 minutes, " including:  IASTM right medial heel       Patient Education and Home Exercises       Education provided:   - ice for pain    Written Home Exercises Provided: Patient instructed to cont prior HEP. Exercises were reviewed and Michelle was able to demonstrate them prior to the end of the session.  Michelle demonstrated good  understanding of the education provided. See EMR under Patient Instructions for exercises provided during therapy sessions    Assessment     Patient presents with continued R heel discomfort. Introduced unstable surface SLS, Michelle reported mild discomfort with last 2 reps but able to manage. Added resistance with hip abd/ext for inc'd LE support with prolonged standing and walking.    Michelle Is progressing well towards her goals.   Pt prognosis is Good.     Pt will continue to benefit from skilled outpatient physical therapy to address the deficits listed in the problem list box on initial evaluation, provide pt/family education and to maximize pt's level of independence in the home and community environment.     Pt's spiritual, cultural and educational needs considered and pt agreeable to plan of care and goals.     Anticipated barriers to physical therapy: chronicity of pain     Goals:   Short Term Goals: 3 weeks   Pt will be compliant with HEP to supplement PT with decreasing pain and improving functional mobility  Pt will improve (B) DF AROM to 10 degrees in order to improve functional gait     Long Term Goals: 6 weeks   Pt will improve FOTO score to at least 61 in order to demo improved functional mobility  Pt will improve LE MMT scores by at least 1/3 grade where deficits noted in order to improve strength for functional tasks  Pt will perform 25 (B) SL heel raises in order to demo improved strength for functional tasks  Pt will report (R) heel pain </= 3/10 at worst in order to be able to perform ADLs with less difficulty     Plan     Plan of care Certification: 12/5/2024 to 1/17/25.     Outpatient  Physical Therapy 2 times weekly for 6 weeks to include the following interventions: Gait Training, Manual Therapy, Moist Heat/ Ice, Neuromuscular Re-ed, Patient Education, Therapeutic Activities, Therapeutic Exercise, and IASTM, dry needling, and modalities prn .     PT/PTA met face to face to discuss pt's treatment plan and progress towards established goals. Pt will be seen by a physical therapist minimally every 6th visit or every 30 days.      Cliff Salazar PTA

## 2024-12-30 ENCOUNTER — CLINICAL SUPPORT (OUTPATIENT)
Dept: REHABILITATION | Facility: HOSPITAL | Age: 39
End: 2024-12-30
Payer: COMMERCIAL

## 2024-12-30 ENCOUNTER — HOSPITAL ENCOUNTER (OUTPATIENT)
Dept: SLEEP MEDICINE | Facility: HOSPITAL | Age: 39
Discharge: HOME | End: 2024-12-30
Attending: NURSE PRACTITIONER
Payer: COMMERCIAL

## 2024-12-30 ENCOUNTER — OFFICE VISIT (OUTPATIENT)
Dept: CARDIOLOGY | Facility: CLINIC | Age: 39
End: 2024-12-30
Payer: COMMERCIAL

## 2024-12-30 VITALS — OXYGEN SATURATION: 98 % | HEART RATE: 79 BPM | WEIGHT: 252 LBS | BODY MASS INDEX: 39.55 KG/M2 | HEIGHT: 67 IN

## 2024-12-30 DIAGNOSIS — E78.2 MIXED HYPERLIPIDEMIA: ICD-10-CM

## 2024-12-30 DIAGNOSIS — Z91.89 AT RISK FOR OBSTRUCTIVE SLEEP APNEA: ICD-10-CM

## 2024-12-30 DIAGNOSIS — J45.20 MILD INTERMITTENT ASTHMA WITHOUT COMPLICATION: ICD-10-CM

## 2024-12-30 DIAGNOSIS — F33.0 DEPRESSION, MAJOR, RECURRENT, MILD: ICD-10-CM

## 2024-12-30 DIAGNOSIS — M79.671 PAIN OF RIGHT HEEL: Primary | ICD-10-CM

## 2024-12-30 DIAGNOSIS — E66.01 CLASS 3 SEVERE OBESITY DUE TO EXCESS CALORIES WITH SERIOUS COMORBIDITY AND BODY MASS INDEX (BMI) OF 40.0 TO 44.9 IN ADULT: ICD-10-CM

## 2024-12-30 DIAGNOSIS — G47.30 SLEEP APNEA, UNSPECIFIED TYPE: ICD-10-CM

## 2024-12-30 DIAGNOSIS — E66.813 CLASS 3 SEVERE OBESITY DUE TO EXCESS CALORIES WITH SERIOUS COMORBIDITY AND BODY MASS INDEX (BMI) OF 40.0 TO 44.9 IN ADULT: ICD-10-CM

## 2024-12-30 DIAGNOSIS — I10 BENIGN HYPERTENSION: Primary | ICD-10-CM

## 2024-12-30 PROCEDURE — 97140 MANUAL THERAPY 1/> REGIONS: CPT

## 2024-12-30 PROCEDURE — 99214 OFFICE O/P EST MOD 30 MIN: CPT | Mod: S$GLB,,,

## 2024-12-30 PROCEDURE — 1160F RVW MEDS BY RX/DR IN RCRD: CPT | Mod: CPTII,S$GLB,,

## 2024-12-30 PROCEDURE — 3008F BODY MASS INDEX DOCD: CPT | Mod: CPTII,S$GLB,,

## 2024-12-30 PROCEDURE — 99999 PR PBB SHADOW E&M-EST. PATIENT-LVL III: CPT | Mod: PBBFAC,,,

## 2024-12-30 PROCEDURE — 95800 SLP STDY UNATTENDED: CPT

## 2024-12-30 PROCEDURE — 97110 THERAPEUTIC EXERCISES: CPT

## 2024-12-30 PROCEDURE — 1159F MED LIST DOCD IN RCRD: CPT | Mod: CPTII,S$GLB,,

## 2024-12-30 NOTE — ASSESSMENT & PLAN NOTE
-Refer to Sleep medicine for evaluation of MARCO ANTONIO  -appt on  Nov. 1st and planned for home sleep study

## 2024-12-30 NOTE — ASSESSMENT & PLAN NOTE
Body mass index is 39.47 kg/m². Morbid obesity complicates all aspects of disease management from diagnostic modalities to treatment. Weight loss encouraged and health benefits explained to patient.   -currently on Mounjaro and doing well   -13 pound weight loss since last visit

## 2024-12-30 NOTE — PROGRESS NOTES
"  Physical Therapy Daily Treatment Note     Name: Michelle Messer  Clinic Number: [id number]    Therapy Diagnosis:   Encounter Diagnosis   Name Primary?    Pain of right heel Yes     Physician: Fausto Bo Jr., *    Visit Date: 12/30/2024    Physician Orders: PT Eval and Treat   Medical Diagnosis from Referral:   M72.2 (ICD-10-CM) - Plantar fasciitis of right foot   M21.869 (ICD-10-CM) - Acquired posterior equinus, unspecified laterality      Evaluation Date: 12/5/2024  Authorization Period Expiration: 11/25/25  Plan of Care Expiration: 1/17/25  Progress Note Due: 1/5/25  Visit # / Visits authorized: 1/ 1 4/12  FOTO: 1/ 3    Time In: 9:02 am   Time Out: 9:54 am   Total Billable Time: 52 minutes    Precautions: Standard and asthma    Subjective     Pt reports: at times pain is better, but once she starts doing things on her feet pain picks back up.  She was compliant with home exercise program.  Response to previous treatment: no adverse effects  Functional change: progressing     Pain: 4/10 (R) heel       Objective       therapeutic exercises to develop strength,  ROM, flexibility for 37 minutes including:  B gastroc str 3x30"  B soleus str 3x30"  PF stretch 3x30"  Thera bar rolling 2'  DL heel raises 2x10 not performed   Standing hip abd/ext RTB 2x10 not performed   Standing DF mobilization on L3 step with GTB 1x10 (scouring to each toe)   Toe yoga 20x (R)  Reverse toe yoga x20 R   Red banded ankle 4 way 20x    NEUROMUSCULAR RE-EDUCATION ACTIVITIES to improve Balance, Coordination,Proprioception for 5 minutes.  The following were included:   Airex step ups x7 then held due to pain   Tandem stance on airex  3x30" B       manual therapy techniques:  were applied to the: right heel for 10 minutes, including:  IASTM to (R) calf and plantar aspect of (R) Foot/heel      Home Exercises Provided and Patient Education Provided     Education provided:   - HEP     Written Home Exercises Provided: Patient instructed to cont " prior HEP.  Michelle demonstrated good  understanding of the education provided.     See EMR under Patient Instructions for exercises provided prior visit.      Assessment     Additional reps of steps ups on airex deferred due to pt report of increased pain in (R) heel. Added DF mobilization noted above for improved (B) ankle DF AROM. At end of session pt reported that (R) heel was feeling more aggravated therefore deferred performing additional therex.   Michelle Is progressing well towards her goals.   Pt prognosis is Good.     Pt will continue to benefit from skilled outpatient physical therapy to address the deficits listed in the problem list box on initial evaluation, provide pt/family education and to maximize pt's level of independence in the home and community environment.     Pt's spiritual, cultural and educational needs considered and pt agreeable to plan of care and goals.      Anticipated barriers to physical therapy: chronicity of pain     Goals:   Short Term Goals: 3 weeks   Pt will be compliant with HEP to supplement PT with decreasing pain and improving functional mobility  Pt will improve (B) DF AROM to 10 degrees in order to improve functional gait     Long Term Goals: 6 weeks   Pt will improve FOTO score to at least 61 in order to demo improved functional mobility  Pt will improve LE MMT scores by at least 1/3 grade where deficits noted in order to improve strength for functional tasks  Pt will perform 25 (B) SL heel raises in order to demo improved strength for functional tasks  Pt will report (R) heel pain </= 3/10 at worst in order to be able to perform ADLs with less difficulty     Plan     Continue per POC, progress as tolerated     Annamarie Julian, PT

## 2024-12-30 NOTE — PROGRESS NOTES
Subjective:    Patient ID:  Michelle Messer is a 39 y.o. female who presents for evaluation of No chief complaint on file.      PCP: Paul Aguilar MD     Referring Provider: Taya Mclean MD    HPI: patient is a 37 yo F w/PMH of HTN, obesity, asthma who presents  today for f/u appt. She was last seen on 24 for f/u and HTN management. She was continued on medical therapy, Procardia 60 mg BID and labetalol 200 mg BID and referred to sleep medicine. She was seen by sleep medicine on 24 and planned for home sleep study. Patient denies CP, SOB, palpitations, orthopnea, PND, presyncope, LOC, swelling, or claudication. She notes snoring, denies PND. Patient monitors home BP and is enrolled in digital medicine BP ranges 120-130/70s w medical therapy. She is currently on Mounjrao for weight loss management. Patient reports medication compliance without side effects. Patient does exercise regularly and walks daily.     Past Medical History:   Diagnosis Date    Allergy     Asthma     Eczema     Hiatal hernia     Hypertension      Past Surgical History:   Procedure Laterality Date    LAPAROSCOPY  2022    endometriosis    TONSILLECTOMY       Social History     Socioeconomic History    Marital status:    Tobacco Use    Smoking status: Former     Current packs/day: 0.00     Types: Cigarettes     Quit date: 4/10/2018     Years since quittin.7     Passive exposure: Never    Smokeless tobacco: Never   Substance and Sexual Activity    Alcohol use: Not Currently    Drug use: No    Sexual activity: Yes     Partners: Male     Birth control/protection: None   Social History Narrative    Tobacco: Intermittent use; once per month has 7 cigarettes; Used ages 20 yo-32 yo; max use 0.5 ppd    EtOH: Twice per week, 2 drinks at one time    Drug: None    Employment: Teacher ()     Education: Bachelor's     Lives with  and 10 yo step-son      Social Drivers of Health     Financial  Resource Strain: Low Risk  (6/28/2024)    Overall Financial Resource Strain (CARDIA)     Difficulty of Paying Living Expenses: Not very hard   Food Insecurity: No Food Insecurity (6/28/2024)    Hunger Vital Sign     Worried About Running Out of Food in the Last Year: Never true     Ran Out of Food in the Last Year: Never true   Transportation Needs: No Transportation Needs (1/25/2022)    PRAPARE - Transportation     Lack of Transportation (Medical): No     Lack of Transportation (Non-Medical): No   Physical Activity: Insufficiently Active (6/28/2024)    Exercise Vital Sign     Days of Exercise per Week: 1 day     Minutes of Exercise per Session: 10 min   Stress: Stress Concern Present (6/28/2024)    [employer] [place] [employer] - Occupational Stress Questionnaire     Feeling of Stress : To some extent   Housing Stability: Low Risk  (1/25/2022)    Housing Stability Vital Sign     Unable to Pay for Housing in the Last Year: No     Number of Places Lived in the Last Year: 2     Unstable Housing in the Last Year: No     Family History   Problem Relation Name Age of Onset    Heart failure Paternal Grandmother      Heart disease Maternal Grandmother Lise Cazares     Colon cancer Maternal Grandfather      Leukemia Maternal Grandfather      Hypertension Father Gunner Melohn     Asthma Father Gunner Melohn     Allergies Father Gunner Mcdowellohn     Rheum arthritis Mother Sarita Mcdowellohn     Asthma Mother Sarita Mcdowellohn     Heart failure Mother Sarita Mcdowellohn     Hypertension Mother Sarita Melohn     Allergies Mother Sarita Melohn     Eczema Mother Sarita Mcdowellohn     Breast cancer Neg Hx      Ovarian cancer Neg Hx      Allergic rhinitis Neg Hx      Angioedema Neg Hx      Atopy Neg Hx      Immunodeficiency Neg Hx      Rhinitis Neg Hx      Urticaria Neg Hx         Review of patient's allergies indicates:   Allergen Reactions    Erythromycin Rash       Medication List with Changes/Refills   Current Medications    ALBUTEROL (PROAIR HFA) 90  "MCG/ACTUATION INHALER    Inhale 2 puffs into the lungs every 6 (six) hours as needed for Wheezing. Rescue    ATOMOXETINE (STRATTERA) 60 MG CAPSULE    Take 60 mg by mouth once daily.    BUSPIRONE (BUSPAR) 7.5 MG TABLET    Take 7.5 mg by mouth 2 (two) times daily.    FLOVENT HFA 44 MCG/ACTUATION INHALER    INL 2 PFS PO BID    GABAPENTIN (NEURONTIN) 100 MG CAPSULE    TAKE 1 CAPSULE(100 MG) BY MOUTH EVERY EVENING    LABETALOL (NORMODYNE) 200 MG TABLET    Take 1 tablet (200 mg total) by mouth 2 (two) times daily.    LEVALBUTEROL (XOPENEX) 1.25 MG/3 ML NEBULIZER SOLUTION    Take 3 mLs by nebulization 3 (three) times daily.    MIRTAZAPINE (REMERON) 7.5 MG TAB    Take 7.5 mg by mouth every evening.    MUPIROCIN (BACTROBAN) 2 % OINTMENT    Apply topically 3 (three) times daily. To folliculitis bumps    NIFEDIPINE (PROCARDIA-XL) 60 MG (OSM) 24 HR TABLET    TAKE 1 TABLET(60 MG) BY MOUTH TWICE DAILY    SERTRALINE (ZOLOFT) 100 MG TABLET    Take 100 mg by mouth.    TIRZEPATIDE (MOUNJARO) 7.5 MG/0.5 ML PNIJ    Inject 7.5 mg into the skin every 7 days.       Review of Systems   Constitutional: Negative for diaphoresis and fever.   HENT:  Negative for congestion and hearing loss.    Eyes:  Negative for blurred vision and pain.   Cardiovascular:  Negative for chest pain, claudication, dyspnea on exertion, leg swelling, near-syncope, palpitations and syncope.   Respiratory:  Negative for shortness of breath and sleep disturbances due to breathing.    Hematologic/Lymphatic: Negative for bleeding problem. Does not bruise/bleed easily.   Skin:  Negative for color change and poor wound healing.   Gastrointestinal:  Negative for abdominal pain and nausea.   Genitourinary:  Negative for bladder incontinence and flank pain.   Neurological:  Negative for focal weakness and light-headedness.        Objective:   Pulse 79   Ht 5' 7" (1.702 m)   Wt 114.3 kg (251 lb 15.8 oz)   LMP 12/16/2024   SpO2 98%   BMI 39.47 kg/m²    Physical " Exam  Constitutional:       Appearance: She is well-developed. She is not diaphoretic.   HENT:      Head: Normocephalic and atraumatic.   Eyes:      General: No scleral icterus.     Pupils: Pupils are equal, round, and reactive to light.   Neck:      Vascular: No JVD.   Cardiovascular:      Rate and Rhythm: Normal rate and regular rhythm.      Pulses: Intact distal pulses.           Radial pulses are 2+ on the right side and 2+ on the left side.        Dorsalis pedis pulses are 2+ on the right side and 2+ on the left side.        Posterior tibial pulses are 2+ on the right side and 2+ on the left side.      Heart sounds: S1 normal and S2 normal. No murmur heard.     No friction rub. No gallop.   Pulmonary:      Effort: Pulmonary effort is normal. No respiratory distress.      Breath sounds: Normal breath sounds. No wheezing or rales.   Chest:      Chest wall: No tenderness.   Abdominal:      General: Bowel sounds are normal. There is no distension.      Palpations: Abdomen is soft. There is no mass.      Tenderness: There is no abdominal tenderness. There is no rebound.   Musculoskeletal:         General: No tenderness. Normal range of motion.      Cervical back: Normal range of motion and neck supple.      Right lower le+ Edema present.      Left lower le+ Edema present.   Skin:     General: Skin is warm and dry.      Coloration: Skin is not pale.   Neurological:      Mental Status: She is alert and oriented to person, place, and time.      Coordination: Coordination normal.      Deep Tendon Reflexes: Reflexes normal.   Psychiatric:         Behavior: Behavior normal.         Judgment: Judgment normal.           Assessment:       1. Benign hypertension    2. Mixed hyperlipidemia    3. Class 3 severe obesity due to excess calories with serious comorbidity and body mass index (BMI) of 40.0 to 44.9 in adult    4. At risk for obstructive sleep apnea    5. Depression, major, recurrent, mild    6. Mild  intermittent asthma without complication           Plan:         Benign hypertension  -Goal BP < 130/80  -controlled   -continue medical therapy- labetalol 200 mg (BID), nifedipine 60 mg (BID)   -discussed lifestyle modifications     Mixed hyperlipidemia  -.4 12/8/2023  -currently changed lifestyle modifications   -repeat Lipid panel in 6 months     Class 3 severe obesity with body mass index (BMI) of 40.0 to 44.9 in adult  Body mass index is 39.47 kg/m². Morbid obesity complicates all aspects of disease management from diagnostic modalities to treatment. Weight loss encouraged and health benefits explained to patient.   -currently on Mounjaro and doing well   -13 pound weight loss since last visit     At risk for obstructive sleep apnea  -Refer to Sleep medicine for evaluation of MARCO ANTONIO  -appt on  Nov. 1st and planned for home sleep study    Depression, major, recurrent, mild  -Followed by PCP  -controlled on medical therapy- zoloft and Buspar     Mild intermittent asthma without complication  -followed by PCP  -continue medical therapy         Total duration of face to face visit time 30 minutes.  Total time spent counseling greater than fifty percent of total visit time.  Counseling included discussion regarding imaging findings, diagnosis, possibilities, treatment options, risks and benefits.  The patient had many questions regarding the options and long-term effects      Antonino Meredith, DNP  Cardiology

## 2025-01-05 PROBLEM — G47.30 SLEEP APNEA: Status: ACTIVE | Noted: 2025-01-05

## 2025-01-07 ENCOUNTER — CLINICAL SUPPORT (OUTPATIENT)
Dept: REHABILITATION | Facility: HOSPITAL | Age: 40
End: 2025-01-07
Payer: COMMERCIAL

## 2025-01-07 ENCOUNTER — PATIENT MESSAGE (OUTPATIENT)
Dept: SLEEP MEDICINE | Facility: CLINIC | Age: 40
End: 2025-01-07
Payer: COMMERCIAL

## 2025-01-07 DIAGNOSIS — M79.671 PAIN OF RIGHT HEEL: Primary | ICD-10-CM

## 2025-01-07 DIAGNOSIS — G47.33 OSA (OBSTRUCTIVE SLEEP APNEA): Primary | ICD-10-CM

## 2025-01-07 PROCEDURE — 97110 THERAPEUTIC EXERCISES: CPT | Mod: CQ

## 2025-01-07 PROCEDURE — 97140 MANUAL THERAPY 1/> REGIONS: CPT | Mod: CQ

## 2025-01-07 NOTE — PROGRESS NOTES
"  Physical Therapy Daily Treatment Note     Name: Michelle Messer  Clinic Number: [id number]    Therapy Diagnosis:   Encounter Diagnosis   Name Primary?    Pain of right heel Yes     Physician: Fausto Bo Jr., *    Visit Date: 1/7/2025    Physician Orders: PT Eval and Treat   Medical Diagnosis from Referral:   M72.2 (ICD-10-CM) - Plantar fasciitis of right foot   M21.869 (ICD-10-CM) - Acquired posterior equinus, unspecified laterality      Evaluation Date: 12/5/2024  Authorization Period Expiration: 11/25/25  Plan of Care Expiration: 1/17/25  Progress Note Due: 1/5/25  Visit # / Visits authorized: 1/ 1 4/12, 1/10  FOTO: 1/ 3    Time In: 3:45 pm   Time Out: 4:45 pm   Total Billable Time: 60 minutes    Precautions: Standard and asthma    Subjective     Pt reports: the foot is kind of hurting today  She was compliant with home exercise program.  Response to previous treatment: no adverse effects  Functional change: progressing     Pain: 6/10 (R) heel       Objective       therapeutic exercises to develop strength,  ROM, flexibility for 40 minutes including:  B gastroc str 3x30"  B soleus str 3x30"  PF stretch 3x30"  Thera bar rolling 2'  DL heel raises 2x10   Standing hip abd/ext RTB 2x10   Standing DF mobilization on L3 step with GTB 1x10 (scouring to each toe)   Toe yoga 20x (R)  Reverse toe yoga x20 R   Red banded ankle 4 way 20x    NEUROMUSCULAR RE-EDUCATION ACTIVITIES to improve Balance, Coordination,Proprioception for 4 minutes.  The following were included:   Airex step ups NT  Tandem stance on airex  3x30" B       manual therapy techniques:  were applied to the: right heel for 15 minutes, including:  IASTM to (R) calf and plantar aspect of (R) Foot/heel      Home Exercises Provided and Patient Education Provided     Education provided:   - HEP     Written Home Exercises Provided: Patient instructed to cont prior HEP.  Michelle demonstrated good  understanding of the education provided.     See EMR under Patient " Instructions for exercises provided prior visit.      Assessment   Pt tolerated session without reports of pain or discomfort. She responded well to IASTM from previous session therefore applied today. Patient verbalized tenderness along medial heel with manual therapy but able to manage. Soft tissue restrictions noted in gastroc belly greater laterally.    Michelle Is progressing well towards her goals.   Pt prognosis is Good.     Pt will continue to benefit from skilled outpatient physical therapy to address the deficits listed in the problem list box on initial evaluation, provide pt/family education and to maximize pt's level of independence in the home and community environment.     Pt's spiritual, cultural and educational needs considered and pt agreeable to plan of care and goals.      Anticipated barriers to physical therapy: chronicity of pain     Goals:   Short Term Goals: 3 weeks   Pt will be compliant with HEP to supplement PT with decreasing pain and improving functional mobility  Pt will improve (B) DF AROM to 10 degrees in order to improve functional gait     Long Term Goals: 6 weeks   Pt will improve FOTO score to at least 61 in order to demo improved functional mobility  Pt will improve LE MMT scores by at least 1/3 grade where deficits noted in order to improve strength for functional tasks  Pt will perform 25 (B) SL heel raises in order to demo improved strength for functional tasks  Pt will report (R) heel pain </= 3/10 at worst in order to be able to perform ADLs with less difficulty     Plan     Continue per POC, progress as tolerated     Cliff Salazar, VARSHA

## 2025-01-09 ENCOUNTER — CLINICAL SUPPORT (OUTPATIENT)
Dept: REHABILITATION | Facility: HOSPITAL | Age: 40
End: 2025-01-09
Payer: COMMERCIAL

## 2025-01-09 DIAGNOSIS — M79.671 PAIN OF RIGHT HEEL: Primary | ICD-10-CM

## 2025-01-09 PROCEDURE — 97110 THERAPEUTIC EXERCISES: CPT

## 2025-01-09 PROCEDURE — 97140 MANUAL THERAPY 1/> REGIONS: CPT

## 2025-01-09 NOTE — PROGRESS NOTES
"  Physical Therapy Daily Treatment Note     Name: Michelle Messer  Clinic Number: [id number]    Therapy Diagnosis:   Encounter Diagnosis   Name Primary?    Pain of right heel Yes     Physician: Fausto Bo Jr., *    Visit Date: 1/9/2025    Physician Orders: PT Eval and Treat   Medical Diagnosis from Referral:   M72.2 (ICD-10-CM) - Plantar fasciitis of right foot   M21.869 (ICD-10-CM) - Acquired posterior equinus, unspecified laterality      Evaluation Date: 12/5/2024  Authorization Period Expiration: 11/25/25  Plan of Care Expiration: 1/17/25  Progress Note Due: 1/5/25  Visit # / Visits authorized: 1/ 1 4/12, 2/10  FOTO: 1/ 3    Time In: 2:37 pm   Time Out: 3:30 pm   Total Billable Time: 53 minutes    Precautions: Standard and asthma    Subjective     Pt reports: that sometimes (R) foot feels better. Pt stated that (R) Foot is hurting today.   She was compliant with home exercise program.  Response to previous treatment: no adverse effects   Functional change: see assessment     Pain: 6/10 (R) foot       Objective       Right  Right   Left  Pain/Dysfunction with Movement     AROM MMT AROM MMT     Flexion WFL 5/5 WFL 5/5     Extension WFL 4+/5 WFL 4+/5     Abduction WFL 4+/5 WFL 4+/5        Knee Right   Left   Pain/Dysfunction with Movement     AROM MMT AROM MMT     Flexion WFL 5/5 WFL 5/5     Extension WFL 5/5 WFL 5/5        Ankle Right   Left   Pain/Dysfunction with Movement     AROM MMT AROM MMT     Plantarflexion 65 See below 65 See below     Dorsiflexion 8 5/5 8 5/5     Inversion 30 5/5 30 5/5     Eversion 15 5/5 15 5/5        SLS: (R) = 30 seconds - mild decreased stability noted, (L) = 30 seconds ( test stopped at 30 seconds) - mild decreased stability noted     SL Heel Raise Test (performed to fatigue): (R) = 12 then stopped due to pain and difficulty, (L) = 16       Objective measurements and therapeutic exercises to develop strength,  ROM, flexibility for 39  minutes including:  B gastroc str 3x30"  B " "soleus str 3x30"  PF stretch 3x30"  Thera bar rolling 2'  DL heel raises 2x10   Standing hip abd/ext RTB 2x10   Standing DF mobilization on L3 step with GTB 2x10 (scouring to each toe) R, held on L  Toe yoga 20x (R)  Reverse toe yoga x20 R   Red banded ankle 4 way 20x not performed     NEUROMUSCULAR RE-EDUCATION ACTIVITIES to improve Balance, Coordination,Proprioception for 4 minutes.  The following were included:   Airex step ups Not performed   Tandem stance on airex  3x30" B       manual therapy techniques:  were applied to the: right heel for 10 minutes, including:  IASTM to (R) calf and plantar aspect of (R) Foot/heel      Home Exercises Provided and Patient Education Provided     Education provided:   - HEP     Written Home Exercises Provided: Patient instructed to cont prior HEP.  Michelle demonstrated good  understanding of the education provided.     See EMR under Patient Instructions for exercises provided prior visit.      Assessment     Updated measurements taken and pt demo improvements in (R) hip extension MMT score, (B) hip abduction MMT scores, and (B) IV and EV MMT scores compared to measurements taken on initial evaluation. Pt also demo improvements in (B) DF AROM and (R) SLS score compared to measurements taken on initial evaluation. Pt did demo slightly decreased scores on heel raise test today, (R) most likely secondary to report of pain in (R) foot. Soft tissue restrictions noted along (R) calf and plantar aspect of (R) foot during manual therapy.  Michelle Is progressing towards her goals.   Pt prognosis is Good.     Pt will continue to benefit from skilled outpatient physical therapy to address the deficits listed in the problem list box on initial evaluation, provide pt/family education and to maximize pt's level of independence in the home and community environment.     Pt's spiritual, cultural and educational needs considered and pt agreeable to plan of care and goals.    Anticipated barriers to " physical therapy: chronicity of pain     Goals:   Short Term Goals: 3 weeks   Pt will be compliant with HEP to supplement PT with decreasing pain and improving functional mobility  Pt will improve (B) DF AROM to 10 degrees in order to improve functional gait     Long Term Goals: 6 weeks   Pt will improve FOTO score to at least 61 in order to demo improved functional mobility  Pt will improve LE MMT scores by at least 1/3 grade where deficits noted in order to improve strength for functional tasks  Pt will perform 25 (B) SL heel raises in order to demo improved strength for functional tasks  Pt will report (R) heel pain </= 3/10 at worst in order to be able to perform ADLs with less difficulty     Plan     Continue per POC, progress as tolerated     Annamarie Julian, PT

## 2025-01-16 ENCOUNTER — CLINICAL SUPPORT (OUTPATIENT)
Dept: REHABILITATION | Facility: HOSPITAL | Age: 40
End: 2025-01-16
Payer: COMMERCIAL

## 2025-01-16 DIAGNOSIS — M79.671 PAIN OF RIGHT HEEL: Primary | ICD-10-CM

## 2025-01-16 PROCEDURE — 97140 MANUAL THERAPY 1/> REGIONS: CPT

## 2025-01-16 PROCEDURE — 97110 THERAPEUTIC EXERCISES: CPT

## 2025-01-16 NOTE — PROGRESS NOTES
"  Physical Therapy Daily Treatment Note     Name: Michelle Messer  Clinic Number: [id number]    Therapy Diagnosis:   Encounter Diagnosis   Name Primary?    Pain of right heel Yes     Physician: Fausto Bo Jr., *    Visit Date: 1/16/2025    Physician Orders: PT Eval and Treat   Medical Diagnosis from Referral:   M72.2 (ICD-10-CM) - Plantar fasciitis of right foot   M21.869 (ICD-10-CM) - Acquired posterior equinus, unspecified laterality      Evaluation Date: 12/5/2024  Authorization Period Expiration: 11/25/25  Plan of Care Expiration: 1/17/25  Progress Note Due: 1/5/25  Visit # / Visits authorized: 1/ 1 4/12, 3/10  FOTO: 1/ 3    Time In: 3:34 pm   Time Out: 4:20pm   Total Billable Time: 40 minutes    Precautions: Standard and asthma    Subjective     Pt reports: (R) Foot is doing ok, not as bad today as most days. Pt stated that (R) foot was feeling good on Tuesday during rehearsal.  Pt stated that she feels like PT is helping. Pt stated that she follows up with podiatrist on Wednesday.    She was compliant with home exercise program.  Response to previous treatment: no adverse effects  Functional change: see treatment section    Pain: 3/10 (R) Foot       Objective     therapeutic exercises to develop strength,  ROM, flexibility for 32  minutes including:  B gastroc str 3x30"  B soleus str 3x30"  PF stretch 3x30"  Thera bar rolling 2'  DL heel raises 2x10   Standing hip abd/ext RTB 2x10   Standing DF mobilization on L3 step with GTB 2x10 (scouring to each toe) R, held on L  Toe yoga 20x (R)  Reverse toe yoga x20 R   Red banded ankle 4 way 20x not performed     NEUROMUSCULAR RE-EDUCATION ACTIVITIES to improve Balance, Coordination,Proprioception for 5 minutes.  The following were included:   Airex step ups Not performed   Tandem stance on airex  3x30" B       manual therapy techniques:  were applied to the: right heel for 9 minutes, including:  IASTM to (R) calf and plantar aspect of (R) Foot/heel      Home " Exercises Provided and Patient Education Provided     Education provided:   - HEP     Written Home Exercises Provided: Patient instructed to cont prior HEP.   Michelle demonstrated good  understanding of the education provided.     See EMR under Patient Instructions for exercises provided prior visit.      Assessment     See treatment section   Michelle Is progressing well towards her goals.   Pt prognosis is Good.     Pt will continue to benefit from skilled outpatient physical therapy to address the deficits listed in the problem list box on initial evaluation, provide pt/family education and to maximize pt's level of independence in the home and community environment.     Pt's spiritual, cultural and educational needs considered and pt agreeable to plan of care and goals.    Anticipated barriers to physical therapy: chronicity of pain    Goals: see treatment section     Plan     See treatment section    Annamarie Julian, PT

## 2025-01-16 NOTE — PLAN OF CARE
[facility] OUTPATIENT THERAPY AND WELLNESS  Physical Therapy Plan of Care Note     Name: Michelle Messer  Clinic Number: [id number]    Therapy Diagnosis:   Encounter Diagnosis   Name Primary?    Pain of right heel Yes     Physician: Fausto Bo Jr., *    Visit Date: 1/16/2025    Physician Orders: PT Eval and Treat   Medical Diagnosis from Referral:   M72.2 (ICD-10-CM) - Plantar fasciitis of right foot   M21.869 (ICD-10-CM) - Acquired posterior equinus, unspecified laterality      Evaluation Date: 12/5/2024  Authorization Period Expiration: 11/25/25  Plan of Care Expiration: 1/17/25  Progress Note Due: 1/5/25  Visit # / Visits authorized: 1/ 1 4/12, 3/10  FOTO: 1/ 3    Precautions: standard and asthma       SUBJECTIVE     Update: Pt stated that (R) Foot is doing ok, not as bad today as most days. Pt stated that (R) foot was feeling good on Tuesday during rehearsal.  Pt stated that she feels like PT is helping. Pt stated that she follows up with podiatrist on Wednesday.      OBJECTIVE     Update: updated measurements taken at prior visit on 1/9/25        Right    Left   Pain/Dysfunction with Movement     AROM MMT AROM MMT     Flexion WFL 5/5 WFL 5/5     Extension WFL 4+/5 WFL 4+/5     Abduction WFL 4+/5 WFL 4+/5        Knee Right   Left   Pain/Dysfunction with Movement     AROM MMT AROM MMT     Flexion WFL 5/5 WFL 5/5     Extension WFL 5/5 WFL 5/5        Ankle Right   Left   Pain/Dysfunction with Movement     AROM MMT AROM MMT     Plantarflexion 65 See below 65 See below     Dorsiflexion 8 5/5 8 5/5     Inversion 30 5/5 30 5/5     Eversion 15 5/5 15 5/5      SLS: (R) = 30 seconds - mild decreased stability noted, (L) = 30 seconds ( test stopped at 30 seconds) - mild decreased stability noted     SL Heel Raise Test (performed to fatigue): (R) = 12 then stopped due to pain and difficulty, (L) = 16     ASSESSMENT     Update: Updated measurements taken at prior visit on 1/9/25 and pt demo improvements in (R) hip extension  MMT score, (B) hip abduction MMT scores, and (B) IV and EV MMT scores compared to measurements taken on initial evaluation. Pt also demo improvements in (B) DF AROM and (R) SLS score compared to measurements taken on initial evaluation. Despite these improvements, pt still continues to present with decreased (B) DF AROM, decreased hip MMT scores noted above, mild decreased stability when performing (B) SLS, and c/o (R) foot pain. Pt will continue to benefit from skilled PT.       Reasons for Recertification of Therapy:   required updated POC     GOALS  Short Term Goals: 3 weeks   Pt will be compliant with HEP to supplement PT with decreasing pain and improving functional mobility -Met  Pt will improve (B) DF AROM to 10 degrees in order to improve functional gait - progressing not met     Long Term Goals: 6 weeks   Pt will improve FOTO score to at least 61 in order to demo improved functional mobility - progressing not met   Pt will improve LE MMT scores by at least 1/3 grade where deficits noted in order to improve strength for functional tasks - progressing not met  Pt will perform 25 (B) SL heel raises in order to demo improved strength for functional tasks - progressing not met  Pt will report (R) heel pain </= 3/10 at worst in order to be able to perform ADLs with less difficulty - progressing not met     PLAN     Updated Certification Period: 1/16/25 to 2/14/25   Recommended Treatment Plan: 1 times per week for 4 weeks:  Gait Training, Manual Therapy, Moist Heat/ Ice, Neuromuscular Re-ed, Patient Education, Therapeutic Activities, Therapeutic Exercise, and IASTM, dry needling, and modalities prn  Other Recommendations: n/a    Annamarie Julian, PT

## 2025-01-29 RX ORDER — NIFEDIPINE 60 MG/1
60 TABLET, EXTENDED RELEASE ORAL 2 TIMES DAILY
Qty: 180 TABLET | Refills: 0 | Status: SHIPPED | OUTPATIENT
Start: 2025-01-29

## 2025-01-29 NOTE — TELEPHONE ENCOUNTER
Refill Decision Note   Michelle Messer  is requesting a refill authorization.  Brief Assessment and Rationale for Refill:  Approve     Medication Therapy Plan:         Comments:     Note composed:11:53 AM 01/29/2025

## 2025-01-31 ENCOUNTER — OFFICE VISIT (OUTPATIENT)
Dept: PODIATRY | Facility: CLINIC | Age: 40
End: 2025-01-31
Payer: COMMERCIAL

## 2025-01-31 DIAGNOSIS — M72.2 PLANTAR FASCIITIS OF RIGHT FOOT: Primary | ICD-10-CM

## 2025-01-31 DIAGNOSIS — M21.869 ACQUIRED POSTERIOR EQUINUS, UNSPECIFIED LATERALITY: ICD-10-CM

## 2025-01-31 RX ADMIN — DEXAMETHASONE SODIUM PHOSPHATE 4 MG: 4 INJECTION, SOLUTION INTRA-ARTICULAR; INTRALESIONAL; INTRAMUSCULAR; INTRAVENOUS; SOFT TISSUE at 09:01

## 2025-01-31 NOTE — PATIENT INSTRUCTIONS
Heel Pain (Plantar Fasciitis)        Heel pain is most often caused by plantar fasciitis, a condition that is sometimes also called heel spur syndrome when a spur is present. Heel pain may also be due to other causes, such as a stress fracture, tendonitis, arthritis, nerve irritation or, rarely, a cyst.    Because there are several potential causes, it is important to have heel pain properly diagnosed. A foot and ankle surgeon is able to distinguish between all the possibilities and to determine the underlying source of your heel pain.    What Is Plantar Fasciitis?  Heel pain is often caused by plantar fasciitis  Plantar fasciitis is an inflammation of the band of tissue (the plantar fascia) that extends from the heel to the toes. In this condition, the fascia first becomes irritated and then inflamed, resulting in heel pain.    Causes  The most common cause of plantar fasciitis relates to faulty structure of the foot. For example, people who have problems with their arches, either overly flat feet or high-arched feet, are more prone to developing plantar fasciitis.    Wearing nonsupportive footwear on hard, flat surfaces puts abnormal strain on the plantar fascia and can also lead to plantar fasciitis. This is particularly evident when ones job requires long hours on the feet. Obesity and overuse may also contribute to plantar fasciitis.    Symptoms  The symptoms of plantar fasciitis are:    Pain on the bottom of the heel  Pain in the arch of the foot  Pain that is usually worse upon arising  Pain that increases over a period of months  Swelling on the bottom of the heel     People with plantar fasciitis often describe the pain as worse when they get up in the morning or after they have been sitting for long periods of time. After a few minutes of walking, the pain decreases because walking stretches the fascia. For some people, the pain subsides but returns after spending long periods of time on their  feet.    Diagnosis  To arrive at a diagnosis, the foot and ankle surgeon will obtain your medical history and examine your foot. Throughout this process, the surgeon rules out all possible causes for your heel pain other than plantar fasciitis.    In addition, diagnostic imaging studies, such as x-rays or other imaging modalities, may be used to distinguish the different types of heel pain. Sometimes heel spurs are found in patients with plantar fasciitis, but these are rarely a source of pain. When they are present, the condition may be diagnosed as plantar fasciitis/heel spur syndrome.    Nonsurgical Treatment  Treatment of plantar fasciitis begins with first-line strategies, which you can begin at home:    -Stretching exercises. Exercises that stretch out the calf muscles help ease pain and assist with recovery.  -Avoid going barefoot. When you walk without shoes, you put undue strain and stress on your plantar fascia.  -Ice. Putting an ice pack on your heel for 20 minutes several times a day helps reduce inflammation. Place a thin towel between the ice and your heel; do not apply ice directly to the skin.  -Limit activities. Cut down on extended physical activities to give your heel a rest.  -Shoe modifications. Wearing supportive shoes that have good arch support and a slightly raised heel reduces stress on the plantar fascia.  -Medications. Oral nonsteroidal anti-inflammatory drugs (NSAIDs), such as ibuprofen, may be recommended to reduce pain and inflammation.     If you still have pain after several weeks, see your foot and ankle surgeon, who may add one or more of these treatment approaches:    -Padding, taping and strapping. Placing pads in the shoe softens the impact of walking. Taping and strapping help support the foot and reduce strain on the fascia.  -Orthotic devices. Custom orthotic devices that fit into your shoe help correct the underlying structural abnormalities causing the plantar  fasciitis.  -Injection therapy. In some cases, corticosteroid injections are used to help reduce the inflammation and relieve pain.  -Removable walking cast. A removable walking cast may be used to keep your foot immobile for a few weeks to allow it to rest and heal.  -Night splint. Wearing a night splint allows you to maintain an extended stretch of the plantar fascia while sleeping. This may help reduce the morning pain experienced by some patients.  -Physical therapy. Exercises and other physical therapy measures may be used to help provide relief.     When Is Surgery Needed?  Although most patients with plantar fasciitis respond to nonsurgical treatment, a small percentage of patients may require surgery. If, after several months of nonsurgical treatment, you continue to have heel pain, surgery will be considered. Your foot and ankle surgeon will discuss the surgical options with you and determine which approach would be most beneficial for you.    Long-Term Care  No matter what kind of treatment you undergo for plantar fasciitis, the underlying causes that led to this condition may remain. Therefore, you will need to continue with preventive measures. Wearing supportive shoes, stretching and using custom orthotic devices are the mainstay of long-term treatment for plantar fasciitis.            Understanding Heel Pain  Your heel is the back part of your foot. A band of tissue called the plantar fascia connects the heel bone to the bones in the ball of your foot. Nerves run from the heel up the inside of your ankle and into your leg. When you feel pain in the bottom of your heel, the plantar fascia may be inflamed. Overuse, Achilles tightness, or excess body weight can cause the tissue to tear or pull away from the bone. Sometimes the inflamed plantar fascia also irritates a nerve, causing more pain.    What causes heel pain?  Wearing shoes with poor cushioning can irritate the tissue in your heel (plantar  fascia). Being overweight or standing for long periods can also irritate the tissue. Running, walking, tennis, and other sports that put stress on the heels can cause tiny tears in the tissue. If your lower leg muscles are tight, this is more likely to occur. A tight Achilles tendon will also contribute to heel pain.  Symptoms  You may feel pain on the bottom or on the inside edge of your heel. The pain may be sharp when you get out of bed or when you stand up after sitting for a while. You may feel a dull ache in your heel after youve been standing for a long time on a hard surface. Running can also cause a dull ache.  Preventing future problems  To prevent future heel pain, wear shoes with well-cushioned heels. And do exercises prescribed by your healthcare provider to stretch the plantar fascia and the muscles in the lower leg.   Date Last Reviewed: 9/10/2015  © 9215-1950 [employer]. [address] [place] [address]. All rights reserved. This information is not intended as a substitute for professional medical care. Always follow your healthcare professional's instructions.      Treating Plantar Fasciitis  First, your healthcare provider tries to determine the cause of your problem in order to suggest ways to relieve pain. If your pain is due to poor foot mechanics, custom-made shoe inserts (orthoses) may help.    Reduce symptoms  To relieve mild symptoms, try aspirin, ibuprofen, or other medicines as directed. Rubbing ice on the affected area may also help.  To reduce severe pain and swelling, your healthcare provider may prescribe pills or injections or a walking cast in some instances. Physical therapy, such as ultrasound or a daily stretching program, may also be recommended. Surgery is rarely required.  To reduce symptoms caused by poor foot mechanics, your foot may be taped. This supports the arch and temporarily controls movement. Night splints may also help by stretching the  fascia.  Control movement  If taping helps, your healthcare provider may prescribe orthoses. Built from plaster casts of your feet, these inserts control the way your foot moves. As a result, your symptoms should go away.  Reduce overuse  Every time your foot strikes the ground, the plantar fascia is stretched. You can reduce the strain on the plantar fascia and the possibility of overuse by following these suggestions:  Lose any excess weight.  Avoid running on hard or uneven ground.  Use orthoses at all times in your shoes and house slippers.  If surgery is needed  Your healthcare provider may consider surgery if other types of treatment don't control your pain. During surgery, the plantar fascia is partially cut to release tension. As you heal, fibrous tissue fills the space between the heel bone and the plantar fascia.   Date Last Reviewed: 10/14/2015  © 0006-7820 [employer]. [address] [place] [address]. All rights reserved. This information is not intended as a substitute for professional medical care. Always follow your healthcare professional's instructions.      Equinus          What Is Equinus?    Equinus is a condition in which the upward bending motion of the ankle joint is limited. Someone with equinus lacks the flexibility to bring the top of the foot toward the front of the leg. Equinus can occur in one or both feet. When it involves both feet, the limitation of motion is sometimes worse in one foot than in the other.    People with equinus develop ways to compensate for their limited ankle motion, and this often leads to other foot, leg or back problems. The most common methods of compensation are flattening of the arch or picking up the heel early when walking, placing increased pressure on the ball of the foot. Other patients compensate by toe walking, while a smaller number take steps by bending abnormally at the hip or knee.    Causes  There are several possible causes  for the limited range of ankle motion. Often, it is due to tightness in the Achilles tendon or calf muscles (the soleus muscle and/or gastrocnemius muscle). In some patients, this tightness is congenital (present at birth), and sometimes it is an inherited trait. Other patients acquire the tightness from being in a cast, being on crutches or frequently wearing high-heeled shoes. In addition, diabetes can affect the fibers of the Achilles tendon and cause tightness. Sometimes equinus is related to a bone blocking the ankle motion. For example, a fragment of a broken bone following an ankle injury, or bone block, can get in the way and restrict motion. Equinus may also result from one leg being shorter than the other. Less often, equinus is caused by spasms in the calf muscle. These spasms may be signs of an underlying neurologic disorder.      Foot Problems Related to Equinus  Depending on how a patient compensates for the inability to bend properly at the ankle, a variety of foot conditions can develop, including:    Plantar fasciitis (arch/heel pain)  Calf cramping  Tendonitis (inflammation in the Achilles tendon)  Metatarsalgia (pain and/or callusing on the ball of the foot)  Flatfoot  Arthritis of the midfoot (middle area of the foot)  Pressure sores on the ball of the foot or the arch  Bunions and hammertoes  Ankle pain  Shin splints     Diagnosis  Most patients with equinus are unaware they have this condition when they first visit the doctor. Instead, they come to the doctor seeking relief for foot problems associated with equinus.    To diagnose equinus, the foot and ankle surgeon will evaluate the ankle's range of motion when the knee is flexed (bent) as well as extended (straightened). This enables the surgeon to identify whether the tendon or muscle is tight and to assess whether bone is interfering with ankle motion. X-rays may also be ordered. In some cases, the foot and ankle surgeon may refer the  patient for neurologic evaluation.    Nonsurgical Treatment  Treatment includes strategies aimed at relieving the symptoms and conditions associated with equinus. In addition, the patient is treated for the equinus itself through one or more of the following options:    Night splint. The foot may be placed in a splint at night to keep it in a position that helps reduce tightness of the calf muscle.  Heel lifts. Placing heel lifts inside the shoes or wearing shoes with a moderate heel takes stress off the Achilles tendon when walking and may reduce symptoms.  Arch supports or orthotic devices. Custom orthotic devices that fit into the shoe are often prescribed to keep weight distributed properly and to help control muscle/tendon imbalance.  Physical therapy. To help remedy muscle tightness, exercises that stretch the calf muscle(s) are recommended.     When Is Surgery Needed?  In some cases, surgery may be needed to correct the cause of equinus if it is related to a tight tendon or a bone blocking the ankle motion. The foot and ankle surgeon will determine the type of procedure that is best suited to the individual patient.                Ankle Dorsiflexion/Plantarflexion (Flexibility)    Sit on the floor or in bed with your legs straight in front of you.  Point both feet. Then flex both feet.  Do this 10 to 30 times in a row.  Repeat this exercise 2 times a day, or as instructed.  Date Last Reviewed: 5/1/2016 © 2000-2016 [employer]. [address] [place] [address]. All rights reserved. This information is not intended as a substitute for professional medical care. Always follow your healthcare professional's instructions.          Arch retraining    These exercises are for your right foot. Switch sides for your left foot.  Sit in a chair or stand with both feet flat on the floor. Press down with the ball of your right foot, but only on the left side of the foot, just under the big  toe.  Then pull the bottom of your big toe back toward your heel. This should pull up the arch of your foot. Dont flex your toes while doing this. It is a subtle movement of the arch.  Hold for 5 seconds. Relax.  Date Last Reviewed: 3/10/2016  © 8075-5453 [employer]. [address] [place] [address]. All rights reserved. This information is not intended as a substitute for professional medical care. Always follow your healthcare professional's instructions.        Soleus Stretch (Flexibility)    Stand facing a wall from 3 feet away. Take one step toward the wall with your right foot.  Place both palms on the wall. Bend both knees and lean forward. Keep both heels on the floor.  Hold for 30 to 60 seconds. Then relax both legs. Repeat the exercise 2 times.  Switch legs and repeat.  Repeat this exercise 3 times a day, or as instructed.     Tip: Dont bounce while youre stretching.   Date Last Reviewed: 3/10/2016  © 9169-4646 [employer]. [address] [place] [address]. All rights reserved. This information is not intended as a substitute for professional medical care. Always follow your healthcare professional's instructions.          Recommended OTC orthotics:  -powerstep  -superfeet    Recommended shoegear:  -new balance  -ascics  -olvin guerrero

## 2025-01-31 NOTE — PROGRESS NOTES
[facility] - PODIATRY  [address]  [address]  [place] [address]  Dept: [phone]  Dept Fax: [phone]    Fausto Bo Jr., DPM     Assessment:   MDM    Coding  1. Plantar fasciitis of right foot  Tendon Sheath      2. Acquired posterior equinus, unspecified laterality            Plan:     Tendon Sheath: R plantar fascia    Date/Time: 1/31/2025 9:45 AM    Performed by: Fausto Bo Jr., DPM  Authorized by: Fausto Bo Jr., DPM    Consent Done?:  Yes (Verbal)  Indications:  Pain  Site marked: the procedure site was marked    Timeout: prior to procedure the correct patient, procedure, and site was verified    Prep: patient was prepped and draped in usual sterile fashion      Local anesthesia used?: Yes    Anesthesia:  Local infiltration  Local anesthetic:  Co-phenylcaine spray  Anesthetic total (ml):  2    Location:  Foot  Site:  R plantar fascia  Ultrasonic guidance for needle placement?: No    Needle size:  25 G  Approach:  Medial  Medications:  4 mg dexAMETHasone 4 mg/mL  Patient tolerance:  Patient tolerated the procedure well with no immediate complications      Michelle was seen today for plantar fasciitis.    Diagnoses and all orders for this visit:    Plantar fasciitis of right foot  -     Tendon Sheath    Acquired posterior equinus, unspecified laterality        -pt seen, evaluated, and managed  -dx discussed in detail. All questions/concerns addressed  -all tx options discussed. All alternatives, risks, benefits of all txs discussed  -the patient was educated about the diagnosis and discussed reducing caloric intake, increase physical activity  -We discussed conservative care options possible including but not limited to shoe wear and/or padding, bracing/strapping, at home ROM, formal PT, medical therapy, injection therapy  - The utilization of NSAIDs can be considered but their benefit has to be tempered against the risk of GI/ concerns  -  A steroid injection can be undertaken.  We did discuss the potential mechanism of action of this shot.  Understanding that multiple injections at the same anatomic site do have deleterious effects on the soft tissue.  Generic risks include: steroid flare (advised to ice if necessary), skin hypo-pgimentation (which can be permanent and unsightly), elevation of blood sugar, subcutaneous atrophy (can be permanent) and infection.   -XR/imaging reviewed by me: agree with read  -labs reviewed by me: ok for rx nsaid  -implemented icing/stretching regimen  -heel lifts dispensed      -rxs dispensed: none  -referrals: none  -WB: wbat      Follow up in about 4 weeks (around 2/28/2025).    Subjective:      Patient ID: Michelle Messer is a 39 y.o. female.    Chief Complaint:   Chief Complaint   Patient presents with    Plantar Fasciitis       CC - foot pain: patient presents to the podiatry clinic  with complaint of  right foot pain. Onset of the symptoms was several months ago. Precipitating event: unk. Current symptoms include: ability to bear weight, but with some pain, nilda the heel, swelling and worsening symptoms after a period of inactivity. Aggravating factors: walking and certain shoegear. Symptoms have gradually worsened. Patient has had no prior foot problems. Evaluation to date: none. Treatment to date: avoidance of offending activity, ice, OTC analgesics which are not very effective, PT which was somewhat effective, and rest. Patients rates pain 7/10 on pain scale.      1/31/25:  Hx as above. Formal PT helped, but pain persisting.         Last Podiatry Enc: 11/25/2024  Last Enc w/ Me: Visit date not found    Outside reports reviewed: historical medical records.  Family hx: as below  Past Medical History:   Diagnosis Date    Allergy     Asthma     Eczema     Hiatal hernia 2009    Hypertension      Past Surgical History:   Procedure Laterality Date    LAPAROSCOPY  11/2022    endometriosis    TONSILLECTOMY       Family  History   Problem Relation Name Age of Onset    Heart failure Paternal Grandmother      Heart disease Maternal Grandmother Lise Cazares     Colon cancer Maternal Grandfather      Leukemia Maternal Grandfather      Hypertension Father Gunner Mcdowellohn     Asthma Father Gunner Mcdowellohn     Allergies Father Gunner Mcdowellohbrielle     Rheum arthritis Mother Sarita Rutledge     Asthma Mother Sarita Rutledge     Heart failure Mother Sarita Rutledge     Hypertension Mother Sarita Rutledge     Allergies Mother Sarita Rutledge     Eczema Mother Sarita Rutledge     Breast cancer Neg Hx      Ovarian cancer Neg Hx      Allergic rhinitis Neg Hx      Angioedema Neg Hx      Atopy Neg Hx      Immunodeficiency Neg Hx      Rhinitis Neg Hx      Urticaria Neg Hx       Current Outpatient Medications   Medication Sig Dispense Refill    atomoxetine (STRATTERA) 60 MG capsule Take 60 mg by mouth once daily.      busPIRone (BUSPAR) 7.5 MG tablet Take 7.5 mg by mouth 2 (two) times daily.      FLOVENT HFA 44 mcg/actuation inhaler INL 2 PFS PO BID 10.6 g 3    gabapentin (NEURONTIN) 100 MG capsule TAKE 1 CAPSULE(100 MG) BY MOUTH EVERY EVENING 30 capsule 11    labetaloL (NORMODYNE) 200 MG tablet Take 1 tablet (200 mg total) by mouth 2 (two) times daily. 60 tablet 11    levalbuterol (XOPENEX) 1.25 mg/3 mL nebulizer solution Take 3 mLs by nebulization 3 (three) times daily. 60 each 3    mirtazapine (REMERON) 7.5 MG Tab Take 7.5 mg by mouth every evening.      mupirocin (BACTROBAN) 2 % ointment Apply topically 3 (three) times daily. To folliculitis bumps 15 g 1    NIFEdipine (PROCARDIA-XL) 60 MG (OSM) 24 hr tablet TAKE ONE TABLET BY MOUTH TWICE DAILY 180 tablet 0    sertraline (ZOLOFT) 100 MG tablet Take 100 mg by mouth.      albuterol (PROAIR HFA) 90 mcg/actuation inhaler Inhale 2 puffs into the lungs every 6 (six) hours as needed for Wheezing. Rescue 18 g 2    tirzepatide (MOUNJARO) 7.5 mg/0.5 mL PnIj Inject 7.5 mg into the skin every 7 days.       No current facility-administered  medications for this visit.     Review of patient's allergies indicates:   Allergen Reactions    Erythromycin Rash     Social History     Socioeconomic History    Marital status:    Tobacco Use    Smoking status: Former     Current packs/day: 0.00     Types: Cigarettes     Quit date: 4/10/2018     Years since quittin.8     Passive exposure: Never    Smokeless tobacco: Never   Substance and Sexual Activity    Alcohol use: Not Currently    Drug use: No    Sexual activity: Yes     Partners: Male     Birth control/protection: None   Social History Narrative    Tobacco: Intermittent use; once per month has 7 cigarettes; Used ages 18 yo-32 yo; max use 0.5 ppd    EtOH: Twice per week, 2 drinks at one time    Drug: None    Employment: Teacher ()     Education: Bachelor's     Lives with  and 8 yo step-son      Social Drivers of Health     Financial Resource Strain: Low Risk  (2024)    Overall Financial Resource Strain (CARDIA)     Difficulty of Paying Living Expenses: Not very hard   Food Insecurity: No Food Insecurity (2024)    Hunger Vital Sign     Worried About Running Out of Food in the Last Year: Never true     Ran Out of Food in the Last Year: Never true   Transportation Needs: No Transportation Needs (2022)    PRAPARE - Transportation     Lack of Transportation (Medical): No     Lack of Transportation (Non-Medical): No   Physical Activity: Insufficiently Active (2024)    Exercise Vital Sign     Days of Exercise per Week: 1 day     Minutes of Exercise per Session: 10 min   Stress: Stress Concern Present (2024)    [employer] [place] [employer] - Occupational Stress Questionnaire     Feeling of Stress : To some extent   Housing Stability: Low Risk  (2022)    Housing Stability Vital Sign     Unable to Pay for Housing in the Last Year: No     Number of Places Lived in the Last Year: 2     Unstable Housing in the Last Year: No       ROS    REVIEW OF  SYSTEMS: Negative as documented below as well as positive findings in bold.       Constitutional  Respiratory  Gastrointestinal  Skin   - Fever - Cough - Heartburn - Rash   - Chills - Spit blood - Nausea - Itching   - Weight Loss - Shortness of breath - Vomiting - Nail pain   - Malaise/Fatigue - Wheezing - Abdominal Pain  Wound/Ulcer   - Weight Gain   - Blood in Stool  Poor wound healing       - Diarrhea          Cardiovascular  Genitourinary  Neurological  HEENT   - Chest Pain - Dysuria - Burning Sensation of feet - Headache   - Palpitations - Hematuria - Tingling / Paresthesia - Congestion   - Pain at night in legs - Flank Pain - Dizziness - Sore Throat   - Cramping   - Tremor - Blurred Vision   - Leg Swelling   - Sensory Change - Double Vision   - Dizzy when standing   - Speech Change - Eye Redness       - Focal Weakness - Dry Eyes       - Loss of Consciousness          Endocrine  Musculoskeletal  Psychiatric   - Cold intolerance - Muscle Pain - Depression   - Heat intolerance - Neck Pain - Insomnia   - Anemia - Joint Pain - Memory Loss   -  Easy bruising, bleeding - Heel pain - Anxiety      Toe Pain        Leg/Ankle/Foot Pain         Objective:     There were no vitals taken for this visit.  Vitals:    01/31/25 0957   PainSc:   3       Physical Exam    General Appearance:   Patient appears well developed, well nourished  Patient appears stated age    Psychiatric:   Patient is oriented to time, place, and person.  Patient has appropriate mood and affect    Neck:  Trachea Midline  No visible masses    Respiratory/Ears:  No distress or labored breathing.  Able to differentiate between normal talking voice and whisper.  Able to follow commands    Eyes:  Visual Acuity intact  Lids and conjunctivae normal. No discoloration noted.    Foot Exam  Physical Exam  Ortho Exam  Ortho/SPM Exam  Foot/Ankle Musculoskeletal Exam    R LE exam con't:  V:  DP 2/4, PT 2/4   CRT< 3s to all digits tested   Tibial and popliteal lymph  nodes are w/o abnormality   Edema: absent, varicosities: absent    N:  Patient displays normal ankle reflexes   SILT in SP/DP/T/Destiny/Saph distributions    Ortho: +Motor EHL/FHL/TA/GA   equinus deformity present  There is moderate pain with palpation of R medial calcaneal tubercle  Compartments soft/compressible. No pain on passive stretch of big toe. No calf  Pain.    Derm:  skin intact, skin warm and dry, skin without ulcers or lesions, skin without induration, nails normal, texture turgor well hydrated      Imaging / Labs:      No results found.      Note: This was dictated using a computer transcription program. Although proofread, it may contain computer transcription errors and phonetic errors. Other human proofreading errors may also exist. Corrections may be performed at a later time. Please contact us for any clarification if needed.    Fausto Bo DPM  [facility]

## 2025-02-17 RX ORDER — DEXAMETHASONE SODIUM PHOSPHATE 4 MG/ML
4 INJECTION, SOLUTION INTRA-ARTICULAR; INTRALESIONAL; INTRAMUSCULAR; INTRAVENOUS; SOFT TISSUE
Status: DISCONTINUED | OUTPATIENT
Start: 2025-01-31 | End: 2025-02-17 | Stop reason: HOSPADM

## 2025-02-18 ENCOUNTER — PATIENT MESSAGE (OUTPATIENT)
Dept: PRIMARY CARE CLINIC | Facility: CLINIC | Age: 40
End: 2025-02-18
Payer: COMMERCIAL

## 2025-02-28 ENCOUNTER — TELEPHONE (OUTPATIENT)
Dept: OBSTETRICS AND GYNECOLOGY | Facility: CLINIC | Age: 40
End: 2025-02-28
Payer: COMMERCIAL

## 2025-02-28 NOTE — TELEPHONE ENCOUNTER
Attempted to contact pt regarding rescheduling her appointment. I left a voicemail for pt to return the phone call.

## 2025-03-19 RX ORDER — LABETALOL 200 MG/1
TABLET, FILM COATED ORAL
Qty: 180 TABLET | Refills: 0 | Status: SHIPPED | OUTPATIENT
Start: 2025-03-19

## 2025-03-19 NOTE — TELEPHONE ENCOUNTER
Refill Routing Note   Medication(s) are not appropriate for processing by [facility] for the following reason(s):        Drug-disease interaction    [facility] action(s):  Defer             Pharmacist review requested: Yes     Appointments  past 12m or future 3m with PCP    Date Provider   Last Visit   2/14/2024 Taya Mclean MD   Next Visit   Visit date not found Taya Mclean MD   ED visits in past 90 days: 0        Note composed:1:03 PM 03/19/2025

## 2025-03-19 NOTE — TELEPHONE ENCOUNTER
Refill Decision Note   Michelle Messer  is requesting a refill authorization.  Brief Assessment and Rationale for Refill:  Approve     Medication Therapy Plan:         Pharmacist review requested: Yes   Extended chart review required: Yes   Comments:     Note composed:1:11 PM 03/19/2025

## 2025-03-24 ENCOUNTER — OFFICE VISIT (OUTPATIENT)
Dept: PRIMARY CARE CLINIC | Facility: CLINIC | Age: 40
End: 2025-03-24
Payer: COMMERCIAL

## 2025-03-24 ENCOUNTER — LAB VISIT (OUTPATIENT)
Dept: LAB | Facility: HOSPITAL | Age: 40
End: 2025-03-24
Attending: INTERNAL MEDICINE
Payer: COMMERCIAL

## 2025-03-24 VITALS
SYSTOLIC BLOOD PRESSURE: 122 MMHG | HEART RATE: 71 BPM | HEIGHT: 67 IN | DIASTOLIC BLOOD PRESSURE: 78 MMHG | RESPIRATION RATE: 18 BRPM | BODY MASS INDEX: 38.24 KG/M2 | WEIGHT: 243.63 LBS | OXYGEN SATURATION: 98 %

## 2025-03-24 DIAGNOSIS — Z00.00 ANNUAL PHYSICAL EXAM: ICD-10-CM

## 2025-03-24 DIAGNOSIS — Z00.00 ANNUAL PHYSICAL EXAM: Primary | ICD-10-CM

## 2025-03-24 DIAGNOSIS — B35.4 TINEA CORPORIS: ICD-10-CM

## 2025-03-24 LAB
ABSOLUTE EOSINOPHIL (OHS): 0.68 K/UL
ABSOLUTE MONOCYTE (OHS): 0.63 K/UL (ref 0.3–1)
ABSOLUTE NEUTROPHIL COUNT (OHS): 6.23 K/UL (ref 1.8–7.7)
ALBUMIN SERPL BCP-MCNC: 3.9 G/DL (ref 3.5–5.2)
ALP SERPL-CCNC: 66 UNIT/L (ref 40–150)
ALT SERPL W/O P-5'-P-CCNC: 15 UNIT/L (ref 10–44)
ANION GAP (OHS): 8 MMOL/L (ref 8–16)
AST SERPL-CCNC: 17 UNIT/L (ref 11–45)
BASOPHILS # BLD AUTO: 0.07 K/UL
BASOPHILS NFR BLD AUTO: 0.7 %
BILIRUB SERPL-MCNC: 0.3 MG/DL (ref 0.1–1)
BUN SERPL-MCNC: 18 MG/DL (ref 6–20)
CALCIUM SERPL-MCNC: 10.1 MG/DL (ref 8.7–10.5)
CHLORIDE SERPL-SCNC: 107 MMOL/L (ref 95–110)
CHOLEST SERPL-MCNC: 242 MG/DL (ref 120–199)
CHOLEST/HDLC SERPL: 5.3 {RATIO} (ref 2–5)
CO2 SERPL-SCNC: 23 MMOL/L (ref 23–29)
CREAT SERPL-MCNC: 0.9 MG/DL (ref 0.5–1.4)
ERYTHROCYTE [DISTWIDTH] IN BLOOD BY AUTOMATED COUNT: 14 % (ref 11.5–14.5)
GFR SERPLBLD CREATININE-BSD FMLA CKD-EPI: >60 ML/MIN/1.73/M2
GLUCOSE SERPL-MCNC: 80 MG/DL (ref 70–110)
HCT VFR BLD AUTO: 40.9 % (ref 37–48.5)
HDLC SERPL-MCNC: 46 MG/DL (ref 40–75)
HDLC SERPL: 19 % (ref 20–50)
HGB BLD-MCNC: 12.9 GM/DL (ref 12–16)
IMM GRANULOCYTES # BLD AUTO: 0.03 K/UL (ref 0–0.04)
IMM GRANULOCYTES NFR BLD AUTO: 0.3 % (ref 0–0.5)
LDLC SERPL CALC-MCNC: 169.4 MG/DL (ref 63–159)
LYMPHOCYTES # BLD AUTO: 3.03 K/UL (ref 1–4.8)
MCH RBC QN AUTO: 26.9 PG (ref 27–50)
MCHC RBC AUTO-ENTMCNC: 31.5 G/DL (ref 32–36)
MCV RBC AUTO: 85 FL (ref 82–98)
NONHDLC SERPL-MCNC: 196 MG/DL
NUCLEATED RBC (/100WBC) (OHS): 0 /100 WBC
PLATELET # BLD AUTO: 388 K/UL (ref 150–450)
PMV BLD AUTO: 10.2 FL (ref 9.2–12.9)
POTASSIUM SERPL-SCNC: 4.7 MMOL/L (ref 3.5–5.1)
PROT SERPL-MCNC: 7.6 GM/DL (ref 6–8.4)
RBC # BLD AUTO: 4.79 M/UL (ref 4–5.4)
RELATIVE EOSINOPHIL (OHS): 6.4 %
RELATIVE LYMPHOCYTE (OHS): 28.4 % (ref 18–48)
RELATIVE MONOCYTE (OHS): 5.9 % (ref 4–15)
RELATIVE NEUTROPHIL (OHS): 58.3 % (ref 38–73)
SODIUM SERPL-SCNC: 138 MMOL/L (ref 136–145)
TRIGL SERPL-MCNC: 133 MG/DL (ref 30–150)
TSH SERPL-ACNC: 1.26 UIU/ML (ref 0.4–4)
WBC # BLD AUTO: 10.67 K/UL (ref 3.9–12.7)

## 2025-03-24 PROCEDURE — 36415 COLL VENOUS BLD VENIPUNCTURE: CPT

## 2025-03-24 PROCEDURE — 99999 PR PBB SHADOW E&M-EST. PATIENT-LVL IV: CPT | Mod: PBBFAC,,, | Performed by: INTERNAL MEDICINE

## 2025-03-24 PROCEDURE — 85025 COMPLETE CBC W/AUTO DIFF WBC: CPT

## 2025-03-24 PROCEDURE — 84443 ASSAY THYROID STIM HORMONE: CPT

## 2025-03-24 PROCEDURE — 82465 ASSAY BLD/SERUM CHOLESTEROL: CPT

## 2025-03-24 PROCEDURE — 82435 ASSAY OF BLOOD CHLORIDE: CPT

## 2025-03-24 RX ORDER — FLUCONAZOLE 150 MG/1
150 TABLET ORAL WEEKLY
Qty: 4 TABLET | Refills: 0 | Status: SHIPPED | OUTPATIENT
Start: 2025-03-24 | End: 2025-04-21

## 2025-03-24 NOTE — PROGRESS NOTES
[facility] Note    Chief Complaint      Chief Complaint   Patient presents with    Annual Exam    Rash       History of Present Illness      Michelle Messer is a 39 y.o. female who presents today for   Chief Complaint   Patient presents with    Annual Exam    Rash   .  Patient comes to appointment here for annual preventative visit with me . She is complaint with all meds . Needs full screening labs . Is up to daet with dr breen gyn . sHe is  .     Problem List Items Addressed This Visit       Annual physical exam - Primary    Overview   pe documented all labs elsa vazquez           Tinea corporis    Overview   Fluconazole 150 mg q week x 4               Past Medical History:  Past Medical History:   Diagnosis Date    Allergy     Asthma     Eczema     Hiatal hernia 2009    Hypertension        Past Surgical History:  Past Surgical History:   Procedure Laterality Date    LAPAROSCOPY  11/2022    endometriosis    TONSILLECTOMY         Family History:  family history includes Allergies in her father and mother; Arthritis in her mother; Asthma in her father and mother; Colon cancer in her maternal grandfather; Depression in her mother; Diabetes in her father; Eczema in her mother; Heart disease in her maternal grandmother and paternal grandmother; Heart failure in her mother and paternal grandmother; Hypertension in her father and mother; Kidney disease in her mother; Leukemia in her maternal grandfather; Mental illness in her mother; Miscarriages / Stillbirths in her mother; Rheum arthritis in her mother; Vision loss in her maternal grandmother.    Social History:  Social History[1]    Review of Systems:   Review of Systems   Constitutional:  Negative for fever and weight loss.   HENT:  Negative for congestion, hearing loss and sore throat.    Eyes:  Negative for blurred vision.   Respiratory:  Negative for cough and shortness of breath.    Cardiovascular:  Negative for chest pain,  "palpitations, claudication and leg swelling.   Gastrointestinal:  Negative for abdominal pain, constipation, diarrhea and heartburn.   Genitourinary:  Negative for dysuria.   Musculoskeletal:  Negative for back pain and myalgias.   Skin:  Positive for rash.   Neurological:  Negative for focal weakness and headaches.   Psychiatric/Behavioral:  Negative for depression and suicidal ideas. The patient is not nervous/anxious.          Medications:  Encounter Medications[2]     Allergies:  Review of patient's allergies indicates:   Allergen Reactions    Erythromycin Rash         Physical Exam         Vitals:    03/24/25 0809   BP: 122/78   Pulse: 71   Resp: 18         Physical Exam  Constitutional:       Appearance: She is well-developed.   Eyes:      Pupils: Pupils are equal, round, and reactive to light.   Neck:      Thyroid: No thyromegaly.   Cardiovascular:      Rate and Rhythm: Normal rate.      Heart sounds: Normal heart sounds. No murmur heard.     No friction rub. No gallop.   Pulmonary:      Breath sounds: Normal breath sounds.   Abdominal:      General: Bowel sounds are normal.      Palpations: Abdomen is soft.   Musculoskeletal:         General: Normal range of motion.      Cervical back: Normal range of motion.   Lymphadenopathy:      Cervical: No cervical adenopathy.   Skin:     General: Skin is warm.      Findings: Erythema and rash present.   Neurological:      Mental Status: She is alert and oriented to person, place, and time.      Cranial Nerves: No cranial nerve deficit.   Psychiatric:         Behavior: Behavior normal.          Laboratory:  CBC:  No results for input(s): "WBC", "RBC", "HGB", "HCT", "PLT", "MCV", "MCH", "MCHC" in the last 2160 hours.  CMP:  No results for input(s): "GLU", "CALCIUM", "ALBUMIN", "PROT", "NA", "K", "CO2", "CL", "BUN", "ALKPHOS", "ALT", "AST", "BILITOT" in the last 2160 hours.    Invalid input(s): "CREATININ"  URINALYSIS:  No results for input(s): "COLORU", "CLARITYU", " ""SPECGRAV", "PHUR", "PROTEINUA", "GLUCOSEU", "BILIRUBINCON", "BLOODU", "WBCU", "RBCU", "BACTERIA", "MUCUS", "NITRITE", "LEUKOCYTESUR", "UROBILINOGEN", "HYALINECASTS" in the last 2160 hours.   LIPIDS:  No results for input(s): "TSH", "HDL", "CHOL", "TRIG", "LDLCALC", "CHOLHDL", "NONHDLCHOL", "TOTALCHOLEST" in the last 2160 hours.  TSH:  No results for input(s): "TSH" in the last 2160 hours.  A1C:  No results for input(s): "HGBA1C" in the last 2160 hours.    Radiology:        Assessment:     Michelle Messer is a 39 y.o.female with:    Annual physical exam  -     CBC Auto Differential; Future; Expected date: 2025  -     Comprehensive Metabolic Panel; Future; Expected date: 2025  -     Lipid Panel; Future; Expected date: 2025  -     TSH; Future; Expected date: 2025    Tinea corporis  -     fluconazole (DIFLUCAN) 150 MG Tab; Take 1 tablet (150 mg total) by mouth once a week.  Dispense: 4 tablet; Refill: 0          Plan:     Problem List Items Addressed This Visit       Annual physical exam - Primary    Overview   pe documented all labs ordere d.           Tinea corporis    Overview   Fluconazole 150 mg q week x 4             As above, continue current medications and maintain follow up with specialists.  Return to clinic in 12 months.      [facility]                       [1]   Social History  Socioeconomic History    Marital status:    Tobacco Use    Smoking status: Former     Current packs/day: 0.00     Average packs/day: 0.5 packs/day for 15.0 years (7.5 ttl pk-yrs)     Types: Cigarettes     Start date: 2004     Quit date: 4/10/2018     Years since quittin.9     Passive exposure: Never    Smokeless tobacco: Never   Substance and Sexual Activity    Alcohol use: Not Currently    Drug use: No    Sexual activity: Yes     Partners: Male     Birth control/protection: None   Social History Narrative    Tobacco: Intermittent " use; once per month has 7 cigarettes; Used ages 18 yo-34 yo; max use 0.5 ppd    EtOH: Twice per week, 2 drinks at one time    Drug: None    Employment: Teacher ()     Education: Bachelor's     Lives with  and 8 yo step-son      Social Drivers of Health     Financial Resource Strain: Low Risk  (6/28/2024)    Overall Financial Resource Strain (CARDIA)     Difficulty of Paying Living Expenses: Not very hard   Food Insecurity: No Food Insecurity (6/28/2024)    Hunger Vital Sign     Worried About Running Out of Food in the Last Year: Never true     Ran Out of Food in the Last Year: Never true   Transportation Needs: No Transportation Needs (1/25/2022)    PRAPARE - Transportation     Lack of Transportation (Medical): No     Lack of Transportation (Non-Medical): No   Physical Activity: Insufficiently Active (6/28/2024)    Exercise Vital Sign     Days of Exercise per Week: 1 day     Minutes of Exercise per Session: 10 min   Stress: Stress Concern Present (6/28/2024)    [employer] [place] [employer] - Occupational Stress Questionnaire     Feeling of Stress : To some extent   Housing Stability: Low Risk  (1/25/2022)    Housing Stability Vital Sign     Unable to Pay for Housing in the Last Year: No     Number of Places Lived in the Last Year: 2     Unstable Housing in the Last Year: No   [2]   Outpatient Encounter Medications as of 3/24/2025   Medication Sig Dispense Refill    albuterol (PROAIR HFA) 90 mcg/actuation inhaler Inhale 2 puffs into the lungs every 6 (six) hours as needed for Wheezing. Rescue 18 g 2    atomoxetine (STRATTERA) 60 MG capsule Take 60 mg by mouth once daily.      busPIRone (BUSPAR) 7.5 MG tablet Take 7.5 mg by mouth 2 (two) times daily.      FLOVENT HFA 44 mcg/actuation inhaler INL 2 PFS PO BID 10.6 g 3    gabapentin (NEURONTIN) 100 MG capsule TAKE 1 CAPSULE(100 MG) BY MOUTH EVERY EVENING 30 capsule 11    labetaloL (NORMODYNE) 200 MG tablet TAKE 1 TABLET(200 MG) BY MOUTH  TWICE DAILY 180 tablet 0    levalbuterol (XOPENEX) 1.25 mg/3 mL nebulizer solution Take 3 mLs by nebulization 3 (three) times daily. 60 each 3    mirtazapine (REMERON) 7.5 MG Tab Take 7.5 mg by mouth every evening.      mupirocin (BACTROBAN) 2 % ointment Apply topically 3 (three) times daily. To folliculitis bumps 15 g 1    NIFEdipine (PROCARDIA-XL) 60 MG (OSM) 24 hr tablet TAKE ONE TABLET BY MOUTH TWICE DAILY 180 tablet 0    sertraline (ZOLOFT) 100 MG tablet Take 100 mg by mouth.      fluconazole (DIFLUCAN) 150 MG Tab Take 1 tablet (150 mg total) by mouth once a week. 4 tablet 0    [DISCONTINUED] labetaloL (NORMODYNE) 200 MG tablet Take 1 tablet (200 mg total) by mouth 2 (two) times daily. 60 tablet 11    [DISCONTINUED] tirzepatide (MOUNJARO) 7.5 mg/0.5 mL PnIj Inject 7.5 mg into the skin every 7 days.       No facility-administered encounter medications on file as of 3/24/2025.

## 2025-03-26 ENCOUNTER — TELEPHONE (OUTPATIENT)
Dept: PRIMARY CARE CLINIC | Facility: CLINIC | Age: 40
End: 2025-03-26
Payer: COMMERCIAL

## 2025-04-08 ENCOUNTER — RESULTS FOLLOW-UP (OUTPATIENT)
Dept: PRIMARY CARE CLINIC | Facility: CLINIC | Age: 40
End: 2025-04-08

## 2025-04-08 NOTE — PROGRESS NOTES
The 10-year ASCVD risk score (Altagracia SOUZA, et al., 2019) is: 1.5%    Values used to calculate the score:      Age: 39 years      Sex: Female      Is Non- : No      Diabetic: No      Tobacco smoker: No      Systolic Blood Pressure: 122 mmHg      Is BP treated: Yes      HDL Cholesterol: 46 mg/dL      Total Cholesterol: 242 mg/dL  Labs ok . Cholesterol is borderline elevated . Rec lifestyle changes only with low chol diet and increased exercise

## 2025-04-14 ENCOUNTER — OFFICE VISIT (OUTPATIENT)
Dept: PODIATRY | Facility: CLINIC | Age: 40
End: 2025-04-14
Payer: COMMERCIAL

## 2025-04-14 VITALS — HEIGHT: 67 IN | BODY MASS INDEX: 38.24 KG/M2 | WEIGHT: 243.63 LBS

## 2025-04-14 DIAGNOSIS — M21.869 ACQUIRED POSTERIOR EQUINUS, UNSPECIFIED LATERALITY: ICD-10-CM

## 2025-04-14 DIAGNOSIS — M72.2 PLANTAR FASCIITIS OF RIGHT FOOT: Primary | ICD-10-CM

## 2025-04-14 PROCEDURE — 99213 OFFICE O/P EST LOW 20 MIN: CPT | Mod: S$GLB,,, | Performed by: PODIATRIST

## 2025-04-14 PROCEDURE — 1159F MED LIST DOCD IN RCRD: CPT | Mod: CPTII,S$GLB,, | Performed by: PODIATRIST

## 2025-04-14 PROCEDURE — 3008F BODY MASS INDEX DOCD: CPT | Mod: CPTII,S$GLB,, | Performed by: PODIATRIST

## 2025-04-14 PROCEDURE — 99999 PR PBB SHADOW E&M-EST. PATIENT-LVL IV: CPT | Mod: PBBFAC,,, | Performed by: PODIATRIST

## 2025-04-14 RX ORDER — TOLMETIN SODIUM 400 MG/1
400 CAPSULE ORAL 3 TIMES DAILY
Qty: 90 CAPSULE | Refills: 0 | Status: SHIPPED | OUTPATIENT
Start: 2025-04-14 | End: 2025-05-14

## 2025-04-14 NOTE — PROGRESS NOTES
[facility] - PODIATRY  [address]  [address]  [place] [address]  Dept: [phone]  Dept Fax: [phone]    Fausto Bo Jr., DPM     Assessment:   MDM    Coding  1. Plantar fasciitis of right foot  Ambulatory Referral/Consult to Physical Therapy    MRI [place] WO Contrast RT    tolmetin (TOLECTIN) 400 mg capsule    CANCELED: Tendon Sheath      2. Acquired posterior equinus, unspecified laterality  Ambulatory Referral/Consult to Physical Therapy          Plan:     Procedures    Michelle was seen today for foot pain and foot problem.    Diagnoses and all orders for this visit:    Plantar fasciitis of right foot  -     Ambulatory Referral/Consult to Physical Therapy; Future  -     Cancel: Tendon Sheath  -     MRI [place] WO Contrast RT; Future  -     tolmetin (TOLECTIN) 400 mg capsule; Take 1 capsule (400 mg total) by mouth 3 (three) times daily.    Acquired posterior equinus, unspecified laterality  -     Ambulatory Referral/Consult to Physical Therapy; Future        -pt seen, evaluated, and managed  -dx discussed in detail. All questions/concerns addressed  -all tx options discussed. All alternatives, risks, benefits of all txs discussed  -the patient was educated about the diagnosis and discussed reducing caloric intake, increase physical activity  -patient has failed conservative care options possible including but not limited to shoe wear and/or padding, bracing/strapping, at home ROM, formal PT, medical therapy, injection therapy  - The utilization of NSAIDs can be considered but their benefit has to be tempered against the risk of GI/ concerns  -XR/imaging reviewed by me: agree with read  -labs reviewed by me: ok for rx nsaid  -MRI ordered to better characterize      -rxs dispensed: none  -referrals: none  -WB: wbat      Follow up in about 4 weeks (around 5/12/2025).    Subjective:      Patient ID: Michelle Messer is a 39 y.o. female.    Chief Complaint:    Chief Complaint   Patient presents with    Foot Pain    Foot Problem     Right foot pain       CC - foot pain: patient presents to the podiatry clinic  with complaint of  right foot pain. Onset of the symptoms was several months ago. Precipitating event: unk. Current symptoms include: ability to bear weight, but with some pain, nilda the heel, swelling and worsening symptoms after a period of inactivity. Aggravating factors: walking and certain shoegear. Symptoms have gradually worsened. Patient has had no prior foot problems. Evaluation to date: none. Treatment to date: avoidance of offending activity, ice, OTC analgesics which are not very effective, PT which was somewhat effective, and rest. Patients rates pain 7/10 on pain scale.      4/14/25:  Hx as above. F/u R PF. Still in pain.    Foot Pain        Last Podiatry Enc: 1/31/2025  Last Enc w/ Me: Visit date not found    Outside reports reviewed: historical medical records.  Family hx: as below  Past Medical History:   Diagnosis Date    Allergy     Asthma     Eczema     Hiatal hernia 2009    Hypertension      Past Surgical History:   Procedure Laterality Date    LAPAROSCOPY  11/2022    endometriosis    TONSILLECTOMY       Family History   Problem Relation Name Age of Onset    Heart failure Paternal Grandmother Layal Melohn     Heart disease Paternal Grandmother Layla Melohn     Heart disease Maternal Grandmother Lise Cazares     Vision loss Maternal Grandmother Lise Cazares     Colon cancer Maternal Grandfather      Leukemia Maternal Grandfather      Hypertension Father Gunner Melohn     Asthma Father Gunner Melohn     Allergies Father Gunner Melohn     Diabetes Father Gunner Melohn     Rheum arthritis Mother Sarita Melohn     Asthma Mother Sarita Melohn     Heart failure Mother Sarita Melohn     Hypertension Mother Sarita Melohn     Allergies Mother Sarita Melohn     Eczema Mother Sarita Melohn     Arthritis Mother Sarita Melohn     Depression Mother Sarita Melohn     Kidney  disease Mother Sarita Rutledge     Miscarriages / Stillbirths Mother Sarita Rutledge     Mental illness Mother Sarita Rutledge         Depression & Anxiety    Breast cancer Neg Hx      Ovarian cancer Neg Hx      Allergic rhinitis Neg Hx      Angioedema Neg Hx      Atopy Neg Hx      Immunodeficiency Neg Hx      Rhinitis Neg Hx      Urticaria Neg Hx       Current Outpatient Medications   Medication Sig Dispense Refill    albuterol (PROAIR HFA) 90 mcg/actuation inhaler Inhale 2 puffs into the lungs every 6 (six) hours as needed for Wheezing. Rescue 18 g 2    atomoxetine (STRATTERA) 60 MG capsule Take 60 mg by mouth once daily.      busPIRone (BUSPAR) 7.5 MG tablet Take 7.5 mg by mouth 2 (two) times daily.      FLOVENT HFA 44 mcg/actuation inhaler INL 2 PFS PO BID 10.6 g 3    fluconazole (DIFLUCAN) 150 MG Tab Take 1 tablet (150 mg total) by mouth once a week. 4 tablet 0    gabapentin (NEURONTIN) 100 MG capsule TAKE 1 CAPSULE(100 MG) BY MOUTH EVERY EVENING 30 capsule 11    labetaloL (NORMODYNE) 200 MG tablet TAKE 1 TABLET(200 MG) BY MOUTH TWICE DAILY 180 tablet 0    levalbuterol (XOPENEX) 1.25 mg/3 mL nebulizer solution Take 3 mLs by nebulization 3 (three) times daily. 60 each 3    mirtazapine (REMERON) 7.5 MG Tab Take 7.5 mg by mouth every evening.      mupirocin (BACTROBAN) 2 % ointment Apply topically 3 (three) times daily. To folliculitis bumps 15 g 1    NIFEdipine (PROCARDIA-XL) 60 MG (OSM) 24 hr tablet TAKE ONE TABLET BY MOUTH TWICE DAILY 180 tablet 0    sertraline (ZOLOFT) 100 MG tablet Take 100 mg by mouth.      tolmetin (TOLECTIN) 400 mg capsule Take 1 capsule (400 mg total) by mouth 3 (three) times daily. 90 capsule 0     No current facility-administered medications for this visit.     Review of patient's allergies indicates:   Allergen Reactions    Erythromycin Rash     Social History     Socioeconomic History    Marital status:    Tobacco Use    Smoking status: Former     Current packs/day: 0.00     Average  packs/day: 0.5 packs/day for 15.0 years (7.5 ttl pk-yrs)     Types: Cigarettes     Start date: 2004     Quit date: 4/10/2018     Years since quittin.0     Passive exposure: Never    Smokeless tobacco: Never   Substance and Sexual Activity    Alcohol use: Not Currently    Drug use: No    Sexual activity: Yes     Partners: Male     Birth control/protection: None   Social History Narrative    Tobacco: Intermittent use; once per month has 7 cigarettes; Used ages 18 yo-32 yo; max use 0.5 ppd    EtOH: Twice per week, 2 drinks at one time    Drug: None    Employment: Teacher ()     Education: Bachelor's     Lives with  and 10 yo step-son      Social Drivers of Health     Financial Resource Strain: Low Risk  (2024)    Overall Financial Resource Strain (CARDIA)     Difficulty of Paying Living Expenses: Not very hard   Food Insecurity: No Food Insecurity (2024)    Hunger Vital Sign     Worried About Running Out of Food in the Last Year: Never true     Ran Out of Food in the Last Year: Never true   Transportation Needs: No Transportation Needs (2022)    PRAPARE - Transportation     Lack of Transportation (Medical): No     Lack of Transportation (Non-Medical): No   Physical Activity: Insufficiently Active (2024)    Exercise Vital Sign     Days of Exercise per Week: 1 day     Minutes of Exercise per Session: 10 min   Stress: Stress Concern Present (2024)    [employer] - Occupational Stress Questionnaire     Feeling of Stress : To some extent   Housing Stability: Low Risk  (2022)    Housing Stability Vital Sign     Unable to Pay for Housing in the Last Year: No     Number of Places Lived in the Last Year: 2     Unstable Housing in the Last Year: No       ROS    REVIEW OF SYSTEMS: Negative as documented below as well as positive findings in bold.       Constitutional  Respiratory  Gastrointestinal  Skin   - Fever - Cough - Heartburn - Rash   -  "Chills - Spit blood - Nausea - Itching   - Weight Loss - Shortness of breath - Vomiting - Nail pain   - Malaise/Fatigue - Wheezing - Abdominal Pain  Wound/Ulcer   - Weight Gain   - Blood in Stool  Poor wound healing       - Diarrhea          Cardiovascular  Genitourinary  Neurological  HEENT   - Chest Pain - Dysuria - Burning Sensation of feet - Headache   - Palpitations - Hematuria - Tingling / Paresthesia - Congestion   - Pain at night in legs - Flank Pain - Dizziness - Sore Throat   - Cramping   - Tremor - Blurred Vision   - Leg Swelling   - Sensory Change - Double Vision   - Dizzy when standing   - Speech Change - Eye Redness       - Focal Weakness - Dry Eyes       - Loss of Consciousness          Endocrine  Musculoskeletal  Psychiatric   - Cold intolerance - Muscle Pain - Depression   - Heat intolerance - Neck Pain - Insomnia   - Anemia - Joint Pain - Memory Loss   -  Easy bruising, bleeding - Heel pain - Anxiety      Toe Pain        Leg/Ankle/Foot Pain         Objective:     Ht 5' 7" (1.702 m)   Wt 110.5 kg (243 lb 9.7 oz)   BMI 38.15 kg/m²   Vitals:    04/14/25 0917   Weight: 110.5 kg (243 lb 9.7 oz)   Height: 5' 7" (1.702 m)   PainSc:   7       Physical Exam    General Appearance:   Patient appears well developed, well nourished  Patient appears stated age    Psychiatric:   Patient is oriented to time, place, and person.  Patient has appropriate mood and affect    Neck:  Trachea Midline  No visible masses    Respiratory/Ears:  No distress or labored breathing.  Able to differentiate between normal talking voice and whisper.  Able to follow commands    Eyes:  Visual Acuity intact  Lids and conjunctivae normal. No discoloration noted.    Foot Exam  Physical Exam  Ortho Exam  Ortho/SPM Exam  Foot/Ankle Musculoskeletal Exam    R LE exam con't:  V:  DP 2/4, PT 2/4   CRT< 3s to all digits tested   Tibial and popliteal lymph nodes are w/o abnormality   Edema: absent, varicosities: absent    N:  Patient displays " normal ankle reflexes   SILT in SP/DP/T/Destiny/Saph distributions    Ortho: +Motor EHL/FHL/TA/GA   equinus deformity present  There is moderate pain with palpation of R medial calcaneal tubercle  Compartments soft/compressible. No pain on passive stretch of big toe. No calf  Pain.    Derm:  skin intact, skin warm and dry, skin without ulcers or lesions, skin without induration, nails normal, texture turgor well hydrated      Imaging / Labs:      No results found.      Note: This was dictated using a computer transcription program. Although proofread, it may contain computer transcription errors and phonetic errors. Other human proofreading errors may also exist. Corrections may be performed at a later time. Please contact us for any clarification if needed.    Fausto Bo DPM  [facility]

## 2025-04-14 NOTE — PATIENT INSTRUCTIONS
Heel Pain (Plantar Fasciitis)        Heel pain is most often caused by plantar fasciitis, a condition that is sometimes also called heel spur syndrome when a spur is present. Heel pain may also be due to other causes, such as a stress fracture, tendonitis, arthritis, nerve irritation or, rarely, a cyst.    Because there are several potential causes, it is important to have heel pain properly diagnosed. A foot and ankle surgeon is able to distinguish between all the possibilities and to determine the underlying source of your heel pain.    What Is Plantar Fasciitis?  Heel pain is often caused by plantar fasciitis  Plantar fasciitis is an inflammation of the band of tissue (the plantar fascia) that extends from the heel to the toes. In this condition, the fascia first becomes irritated and then inflamed, resulting in heel pain.    Causes  The most common cause of plantar fasciitis relates to faulty structure of the foot. For example, people who have problems with their arches, either overly flat feet or high-arched feet, are more prone to developing plantar fasciitis.    Wearing nonsupportive footwear on hard, flat surfaces puts abnormal strain on the plantar fascia and can also lead to plantar fasciitis. This is particularly evident when ones job requires long hours on the feet. Obesity and overuse may also contribute to plantar fasciitis.    Symptoms  The symptoms of plantar fasciitis are:    Pain on the bottom of the heel  Pain in the arch of the foot  Pain that is usually worse upon arising  Pain that increases over a period of months  Swelling on the bottom of the heel     People with plantar fasciitis often describe the pain as worse when they get up in the morning or after they have been sitting for long periods of time. After a few minutes of walking, the pain decreases because walking stretches the fascia. For some people, the pain subsides but returns after spending long periods of time on their  feet.    Diagnosis  To arrive at a diagnosis, the foot and ankle surgeon will obtain your medical history and examine your foot. Throughout this process, the surgeon rules out all possible causes for your heel pain other than plantar fasciitis.    In addition, diagnostic imaging studies, such as x-rays or other imaging modalities, may be used to distinguish the different types of heel pain. Sometimes heel spurs are found in patients with plantar fasciitis, but these are rarely a source of pain. When they are present, the condition may be diagnosed as plantar fasciitis/heel spur syndrome.    Nonsurgical Treatment  Treatment of plantar fasciitis begins with first-line strategies, which you can begin at home:    -Stretching exercises. Exercises that stretch out the calf muscles help ease pain and assist with recovery.  -Avoid going barefoot. When you walk without shoes, you put undue strain and stress on your plantar fascia.  -Ice. Putting an ice pack on your heel for 20 minutes several times a day helps reduce inflammation. Place a thin towel between the ice and your heel; do not apply ice directly to the skin.  -Limit activities. Cut down on extended physical activities to give your heel a rest.  -Shoe modifications. Wearing supportive shoes that have good arch support and a slightly raised heel reduces stress on the plantar fascia.  -Medications. Oral nonsteroidal anti-inflammatory drugs (NSAIDs), such as ibuprofen, may be recommended to reduce pain and inflammation.     If you still have pain after several weeks, see your foot and ankle surgeon, who may add one or more of these treatment approaches:    -Padding, taping and strapping. Placing pads in the shoe softens the impact of walking. Taping and strapping help support the foot and reduce strain on the fascia.  -Orthotic devices. Custom orthotic devices that fit into your shoe help correct the underlying structural abnormalities causing the plantar  fasciitis.  -Injection therapy. In some cases, corticosteroid injections are used to help reduce the inflammation and relieve pain.  -Removable walking cast. A removable walking cast may be used to keep your foot immobile for a few weeks to allow it to rest and heal.  -Night splint. Wearing a night splint allows you to maintain an extended stretch of the plantar fascia while sleeping. This may help reduce the morning pain experienced by some patients.  -Physical therapy. Exercises and other physical therapy measures may be used to help provide relief.     When Is Surgery Needed?  Although most patients with plantar fasciitis respond to nonsurgical treatment, a small percentage of patients may require surgery. If, after several months of nonsurgical treatment, you continue to have heel pain, surgery will be considered. Your foot and ankle surgeon will discuss the surgical options with you and determine which approach would be most beneficial for you.    Long-Term Care  No matter what kind of treatment you undergo for plantar fasciitis, the underlying causes that led to this condition may remain. Therefore, you will need to continue with preventive measures. Wearing supportive shoes, stretching and using custom orthotic devices are the mainstay of long-term treatment for plantar fasciitis.            Understanding Heel Pain  Your heel is the back part of your foot. A band of tissue called the plantar fascia connects the heel bone to the bones in the ball of your foot. Nerves run from the heel up the inside of your ankle and into your leg. When you feel pain in the bottom of your heel, the plantar fascia may be inflamed. Overuse, Achilles tightness, or excess body weight can cause the tissue to tear or pull away from the bone. Sometimes the inflamed plantar fascia also irritates a nerve, causing more pain.    What causes heel pain?  Wearing shoes with poor cushioning can irritate the tissue in your heel (plantar  fascia). Being overweight or standing for long periods can also irritate the tissue. Running, walking, tennis, and other sports that put stress on the heels can cause tiny tears in the tissue. If your lower leg muscles are tight, this is more likely to occur. A tight Achilles tendon will also contribute to heel pain.  Symptoms  You may feel pain on the bottom or on the inside edge of your heel. The pain may be sharp when you get out of bed or when you stand up after sitting for a while. You may feel a dull ache in your heel after youve been standing for a long time on a hard surface. Running can also cause a dull ache.  Preventing future problems  To prevent future heel pain, wear shoes with well-cushioned heels. And do exercises prescribed by your healthcare provider to stretch the plantar fascia and the muscles in the lower leg.   Date Last Reviewed: 9/10/2015  © 2919-6813 [employer]. [address] [place] [address]. All rights reserved. This information is not intended as a substitute for professional medical care. Always follow your healthcare professional's instructions.      Treating Plantar Fasciitis  First, your healthcare provider tries to determine the cause of your problem in order to suggest ways to relieve pain. If your pain is due to poor foot mechanics, custom-made shoe inserts (orthoses) may help.    Reduce symptoms  To relieve mild symptoms, try aspirin, ibuprofen, or other medicines as directed. Rubbing ice on the affected area may also help.  To reduce severe pain and swelling, your healthcare provider may prescribe pills or injections or a walking cast in some instances. Physical therapy, such as ultrasound or a daily stretching program, may also be recommended. Surgery is rarely required.  To reduce symptoms caused by poor foot mechanics, your foot may be taped. This supports the arch and temporarily controls movement. Night splints may also help by stretching the  fascia.  Control movement  If taping helps, your healthcare provider may prescribe orthoses. Built from plaster casts of your feet, these inserts control the way your foot moves. As a result, your symptoms should go away.  Reduce overuse  Every time your foot strikes the ground, the plantar fascia is stretched. You can reduce the strain on the plantar fascia and the possibility of overuse by following these suggestions:  Lose any excess weight.  Avoid running on hard or uneven ground.  Use orthoses at all times in your shoes and house slippers.  If surgery is needed  Your healthcare provider may consider surgery if other types of treatment don't control your pain. During surgery, the plantar fascia is partially cut to release tension. As you heal, fibrous tissue fills the space between the heel bone and the plantar fascia.   Date Last Reviewed: 10/14/2015  © 1405-5169 [employer]. [address] [place] [address]. All rights reserved. This information is not intended as a substitute for professional medical care. Always follow your healthcare professional's instructions.      Equinus          What Is Equinus?    Equinus is a condition in which the upward bending motion of the ankle joint is limited. Someone with equinus lacks the flexibility to bring the top of the foot toward the front of the leg. Equinus can occur in one or both feet. When it involves both feet, the limitation of motion is sometimes worse in one foot than in the other.    People with equinus develop ways to compensate for their limited ankle motion, and this often leads to other foot, leg or back problems. The most common methods of compensation are flattening of the arch or picking up the heel early when walking, placing increased pressure on the ball of the foot. Other patients compensate by toe walking, while a smaller number take steps by bending abnormally at the hip or knee.    Causes  There are several possible causes  for the limited range of ankle motion. Often, it is due to tightness in the Achilles tendon or calf muscles (the soleus muscle and/or gastrocnemius muscle). In some patients, this tightness is congenital (present at birth), and sometimes it is an inherited trait. Other patients acquire the tightness from being in a cast, being on crutches or frequently wearing high-heeled shoes. In addition, diabetes can affect the fibers of the Achilles tendon and cause tightness. Sometimes equinus is related to a bone blocking the ankle motion. For example, a fragment of a broken bone following an ankle injury, or bone block, can get in the way and restrict motion. Equinus may also result from one leg being shorter than the other. Less often, equinus is caused by spasms in the calf muscle. These spasms may be signs of an underlying neurologic disorder.      Foot Problems Related to Equinus  Depending on how a patient compensates for the inability to bend properly at the ankle, a variety of foot conditions can develop, including:    Plantar fasciitis (arch/heel pain)  Calf cramping  Tendonitis (inflammation in the Achilles tendon)  Metatarsalgia (pain and/or callusing on the ball of the foot)  Flatfoot  Arthritis of the midfoot (middle area of the foot)  Pressure sores on the ball of the foot or the arch  Bunions and hammertoes  Ankle pain  Shin splints     Diagnosis  Most patients with equinus are unaware they have this condition when they first visit the doctor. Instead, they come to the doctor seeking relief for foot problems associated with equinus.    To diagnose equinus, the foot and ankle surgeon will evaluate the ankle's range of motion when the knee is flexed (bent) as well as extended (straightened). This enables the surgeon to identify whether the tendon or muscle is tight and to assess whether bone is interfering with ankle motion. X-rays may also be ordered. In some cases, the foot and ankle surgeon may refer the  patient for neurologic evaluation.    Nonsurgical Treatment  Treatment includes strategies aimed at relieving the symptoms and conditions associated with equinus. In addition, the patient is treated for the equinus itself through one or more of the following options:    Night splint. The foot may be placed in a splint at night to keep it in a position that helps reduce tightness of the calf muscle.  Heel lifts. Placing heel lifts inside the shoes or wearing shoes with a moderate heel takes stress off the Achilles tendon when walking and may reduce symptoms.  Arch supports or orthotic devices. Custom orthotic devices that fit into the shoe are often prescribed to keep weight distributed properly and to help control muscle/tendon imbalance.  Physical therapy. To help remedy muscle tightness, exercises that stretch the calf muscle(s) are recommended.     When Is Surgery Needed?  In some cases, surgery may be needed to correct the cause of equinus if it is related to a tight tendon or a bone blocking the ankle motion. The foot and ankle surgeon will determine the type of procedure that is best suited to the individual patient.                Ankle Dorsiflexion/Plantarflexion (Flexibility)    Sit on the floor or in bed with your legs straight in front of you.  Point both feet. Then flex both feet.  Do this 10 to 30 times in a row.  Repeat this exercise 2 times a day, or as instructed.  Date Last Reviewed: 5/1/2016 © 2000-2016 [employer]. [address] [place] [address]. All rights reserved. This information is not intended as a substitute for professional medical care. Always follow your healthcare professional's instructions.          Arch retraining    These exercises are for your right foot. Switch sides for your left foot.  Sit in a chair or stand with both feet flat on the floor. Press down with the ball of your right foot, but only on the left side of the foot, just under the big  toe.  Then pull the bottom of your big toe back toward your heel. This should pull up the arch of your foot. Dont flex your toes while doing this. It is a subtle movement of the arch.  Hold for 5 seconds. Relax.  Date Last Reviewed: 3/10/2016  © 4089-7328 [employer]. [address] [place] [address]. All rights reserved. This information is not intended as a substitute for professional medical care. Always follow your healthcare professional's instructions.        Soleus Stretch (Flexibility)    Stand facing a wall from 3 feet away. Take one step toward the wall with your right foot.  Place both palms on the wall. Bend both knees and lean forward. Keep both heels on the floor.  Hold for 30 to 60 seconds. Then relax both legs. Repeat the exercise 2 times.  Switch legs and repeat.  Repeat this exercise 3 times a day, or as instructed.     Tip: Dont bounce while youre stretching.   Date Last Reviewed: 3/10/2016  © 3855-6074 [employer]. [address] [place] [address]. All rights reserved. This information is not intended as a substitute for professional medical care. Always follow your healthcare professional's instructions.          Recommended OTC orthotics:  -powerstep  -superfeet    Recommended shoegear:  -new balance  -ascics  -olvin guerrero

## 2025-04-24 RX ORDER — NIFEDIPINE 60 MG/1
60 TABLET, EXTENDED RELEASE ORAL 2 TIMES DAILY
Qty: 180 TABLET | Refills: 0 | Status: SHIPPED | OUTPATIENT
Start: 2025-04-24

## 2025-05-07 ENCOUNTER — DOCUMENTATION ONLY (OUTPATIENT)
Dept: REHABILITATION | Facility: HOSPITAL | Age: 40
End: 2025-05-07
Payer: COMMERCIAL

## 2025-05-07 NOTE — PROGRESS NOTES
[facility]   Discharge Note    Name: Michelle Messer  Clinic Number: [id number]    Therapy Diagnosis: No diagnosis found.  Physician: No ref. provider found    Physician Orders: PT Eval and Treat   Medical Diagnosis from Referral:   M72.2 (ICD-10-CM) - Plantar fasciitis of right foot   M21.869 (ICD-10-CM) - Acquired posterior equinus, unspecified laterality      Evaluation Date: 12/5/2024      Date of Last visit: 1/16/25  Total Visits Received: 8    ASSESSMENT          Discharge reason: Patient has not attended therapy since 1/16/25      Goals: see note from 1/16/25    PLAN   This patient is discharged from Physical Therapy      Annamarie Julian, PT

## 2025-05-28 ENCOUNTER — OFFICE VISIT (OUTPATIENT)
Dept: PODIATRY | Facility: CLINIC | Age: 40
End: 2025-05-28
Payer: COMMERCIAL

## 2025-05-28 VITALS — WEIGHT: 239.75 LBS | BODY MASS INDEX: 37.55 KG/M2

## 2025-05-28 DIAGNOSIS — M72.2 PLANTAR FASCIITIS OF RIGHT FOOT: Primary | ICD-10-CM

## 2025-05-28 DIAGNOSIS — M21.869 ACQUIRED POSTERIOR EQUINUS, UNSPECIFIED LATERALITY: ICD-10-CM

## 2025-05-28 PROCEDURE — 1159F MED LIST DOCD IN RCRD: CPT | Mod: CPTII,S$GLB,, | Performed by: PODIATRIST

## 2025-05-28 PROCEDURE — 99999 PR PBB SHADOW E&M-EST. PATIENT-LVL IV: CPT | Mod: PBBFAC,,, | Performed by: PODIATRIST

## 2025-05-28 PROCEDURE — 3008F BODY MASS INDEX DOCD: CPT | Mod: CPTII,S$GLB,, | Performed by: PODIATRIST

## 2025-05-28 PROCEDURE — 99213 OFFICE O/P EST LOW 20 MIN: CPT | Mod: S$GLB,,, | Performed by: PODIATRIST

## 2025-05-28 NOTE — PROGRESS NOTES
[facility] - PODIATRY  [address]  [address]  [place] [address]  Dept: [phone]  Dept Fax: [phone]    Fausto Bo Jr., DPM     Assessment:   MDM    Coding  1. Plantar fasciitis of right foot  EKG 12-lead    X-Ray Chest PA And Lateral    Hemoglobin A1C    Comprehensive Metabolic Panel    Prealbumin    CBC Auto Differential    Case Request Operating Room: FASCIOTOMY, PLANTAR, ENDOSCOPIC, RECESSION, MUSCLE, GASTROCNEMIUS      2. Acquired posterior equinus, unspecified laterality  EKG 12-lead    X-Ray Chest PA And Lateral    Hemoglobin A1C    Comprehensive Metabolic Panel    Prealbumin    CBC Auto Differential    Case Request Operating Room: FASCIOTOMY, PLANTAR, ENDOSCOPIC, RECESSION, MUSCLE, GASTROCNEMIUS          Plan:     Gracie Martinez was seen today for follow-up.    Diagnoses and all orders for this visit:    Plantar fasciitis of right foot  -     EKG 12-lead; Future  -     X-Ray Chest PA And Lateral; Future  -     Hemoglobin A1C; Future  -     Comprehensive Metabolic Panel; Future  -     Prealbumin; Future  -     CBC Auto Differential; Future  -     Case Request Operating Room: FASCIOTOMY, PLANTAR, ENDOSCOPIC, RECESSION, MUSCLE, GASTROCNEMIUS    Acquired posterior equinus, unspecified laterality  -     EKG 12-lead; Future  -     X-Ray Chest PA And Lateral; Future  -     Hemoglobin A1C; Future  -     Comprehensive Metabolic Panel; Future  -     Prealbumin; Future  -     CBC Auto Differential; Future  -     Case Request Operating Room: FASCIOTOMY, PLANTAR, ENDOSCOPIC, RECESSION, MUSCLE, GASTROCNEMIUS        -pt seen, evaluated, and managed  -dx discussed in detail. All questions/concerns addressed  -all tx options discussed. All alternatives, risks, benefits of all txs discussed  -the patient was educated about the diagnosis and discussed reducing caloric intake, increase physical activity  -patient has failed conservative care options possible  including but not limited to shoe wear and/or padding, bracing/strapping, at home ROM, formal PT, medical therapy, injection therapy  - The utilization of NSAIDs can be considered but their benefit has to be tempered against the risk of GI/ concerns  -XR/imaging reviewed by me: agree with read  -labs reviewed by me: ok for rx nsaid  -MRI reviewed  -given failure of conservative measures, we discussed surgical intervention  -pt would benefit from operative intervention: we discussed the following procedures: R EPF+OGR+BMAC  -we discussed approx postop course  -would be out pt, elective surgery  -would GA + block, supine position  -would need PCP clearance before proceeding  -labs and xr on way out  -potential DOS: 7/13/25        -rxs dispensed: none  -referrals: none  -WB: wbat      Follow up in 6 weeks (on 7/9/2025).    Subjective:      Patient ID: Michelle Messer is a 39 y.o. female.    Chief Complaint:   Chief Complaint   Patient presents with    Follow-up     Mri review- right foot       CC - foot pain: patient presents to the podiatry clinic  with complaint of  right foot pain. Onset of the symptoms was several months ago. Precipitating event: unk. Current symptoms include: ability to bear weight, but with some pain, nilda the heel, swelling and worsening symptoms after a period of inactivity. Aggravating factors: walking and certain shoegear. Symptoms have gradually worsened. Patient has had no prior foot problems. Evaluation to date: none. Treatment to date: avoidance of offending activity, ice, OTC analgesics which are not very effective, PT which was somewhat effective, and rest. Patients rates pain 7/10 on pain scale.      5/28/25:  Hx as above. F/u R PF. Failed conservative txs so MRI was ordered and obtained. Still in pain.    Foot Pain    Follow-up        Last Podiatry Enc: 4/14/2025  Last Enc w/ Me: Visit date not found    Outside reports reviewed: historical medical records.  Family hx: as below  Past  Medical History:   Diagnosis Date    Allergy     Asthma     Eczema     Hiatal hernia 2009    Hypertension      Past Surgical History:   Procedure Laterality Date    LAPAROSCOPY  11/2022    endometriosis    TONSILLECTOMY       Family History   Problem Relation Name Age of Onset    Heart failure Paternal Grandmother Layla Rutledge     Heart disease Paternal Grandmother Layla Rutledge     Heart disease Maternal Grandmother Lise Cazares     Vision loss Maternal Grandmother Lise Cazares     Colon cancer Maternal Grandfather      Leukemia Maternal Grandfather      Hypertension Father Gunner Mcdowellohn     Asthma Father Gunner Melohn     Allergies Father Gunner Mcdowellohn     Diabetes Father Gunner Mcdowellohn     Rheum arthritis Mother Sarita Rutledge     Asthma Mother Sarita Rutledge     Heart failure Mother Sarita Rutledge     Hypertension Mother Sarita Rutledge     Allergies Mother Sarita Rutledge     Eczema Mother Sarita Rutledge     Arthritis Mother Sarita Rutledge     Depression Mother Sarita Rutledge     Kidney disease Mother Sarita Rutledge     Miscarriages / Stillbirths Mother Sarita Rutledge     Mental illness Mother Sarita Rutledge         Depression & Anxiety    Breast cancer Neg Hx      Ovarian cancer Neg Hx      Allergic rhinitis Neg Hx      Angioedema Neg Hx      Atopy Neg Hx      Immunodeficiency Neg Hx      Rhinitis Neg Hx      Urticaria Neg Hx       Current Outpatient Medications   Medication Sig Dispense Refill    atomoxetine (STRATTERA) 60 MG capsule Take 60 mg by mouth once daily.      busPIRone (BUSPAR) 7.5 MG tablet Take 7.5 mg by mouth 2 (two) times daily.      gabapentin (NEURONTIN) 100 MG capsule TAKE 1 CAPSULE(100 MG) BY MOUTH EVERY EVENING 30 capsule 11    labetaloL (NORMODYNE) 200 MG tablet TAKE 1 TABLET(200 MG) BY MOUTH TWICE DAILY 180 tablet 0    mirtazapine (REMERON) 7.5 MG Tab Take 7.5 mg by mouth every evening.      NIFEdipine (PROCARDIA-XL) 60 MG (OSM) 24 hr tablet TAKE 1 TABLET BY MOUTH TWICE DAILY 180 tablet 0    sertraline (ZOLOFT) 100 MG tablet  Take 100 mg by mouth.      albuterol (PROAIR HFA) 90 mcg/actuation inhaler Inhale 2 puffs into the lungs every 6 (six) hours as needed for Wheezing. Rescue 18 g 2    FLOVENT HFA 44 mcg/actuation inhaler INL 2 PFS PO BID (Patient not taking: Reported on 2025) 10.6 g 3    levalbuterol (XOPENEX) 1.25 mg/3 mL nebulizer solution Take 3 mLs by nebulization 3 (three) times daily. (Patient not taking: Reported on 2025) 60 each 3    mupirocin (BACTROBAN) 2 % ointment Apply topically 3 (three) times daily. To folliculitis bumps (Patient not taking: Reported on 2025) 15 g 1     No current facility-administered medications for this visit.     Review of patient's allergies indicates:   Allergen Reactions    Silica gel Dermatitis    Erythromycin Rash     Social History     Socioeconomic History    Marital status:    Tobacco Use    Smoking status: Former     Current packs/day: 0.00     Average packs/day: 0.5 packs/day for 15.0 years (7.5 ttl pk-yrs)     Types: Cigarettes     Start date: 2004     Quit date: 4/10/2018     Years since quittin.1     Passive exposure: Never    Smokeless tobacco: Never   Substance and Sexual Activity    Alcohol use: Not Currently    Drug use: No    Sexual activity: Yes     Partners: Male     Birth control/protection: None   Social History Narrative    Tobacco: Intermittent use; once per month has 7 cigarettes; Used ages 18 yo-32 yo; max use 0.5 ppd    EtOH: Twice per week, 2 drinks at one time    Drug: None    Employment: Teacher ()     Education: Bachelor's     Lives with  and 10 yo step-son      Social Drivers of Health     Financial Resource Strain: Low Risk  (2024)    Overall Financial Resource Strain (CARDIA)     Difficulty of Paying Living Expenses: Not very hard   Food Insecurity: No Food Insecurity (2024)    Hunger Vital Sign     Worried About Running Out of Food in the Last Year: Never true     Ran Out of Food in the Last Year: Never  true   Transportation Needs: No Transportation Needs (1/25/2022)    PRAPARE - Transportation     Lack of Transportation (Medical): No     Lack of Transportation (Non-Medical): No   Physical Activity: Insufficiently Active (6/28/2024)    Exercise Vital Sign     Days of Exercise per Week: 1 day     Minutes of Exercise per Session: 10 min   Stress: Stress Concern Present (6/28/2024)    Citizen of [place] of Occupational Health - Occupational Stress Questionnaire     Feeling of Stress : To some extent   Housing Stability: Low Risk  (1/25/2022)    Housing Stability Vital Sign     Unable to Pay for Housing in the Last Year: No     Number of Places Lived in the Last Year: 2     Unstable Housing in the Last Year: No       ROS    REVIEW OF SYSTEMS: Negative as documented below as well as positive findings in bold.       Constitutional  Respiratory  Gastrointestinal  Skin   - Fever - Cough - Heartburn - Rash   - Chills - Spit blood - Nausea - Itching   - Weight Loss - Shortness of breath - Vomiting - Nail pain   - Malaise/Fatigue - Wheezing - Abdominal Pain  Wound/Ulcer   - Weight Gain   - Blood in Stool  Poor wound healing       - Diarrhea          Cardiovascular  Genitourinary  Neurological  HEENT   - Chest Pain - Dysuria - Burning Sensation of feet - Headache   - Palpitations - Hematuria - Tingling / Paresthesia - Congestion   - Pain at night in legs - Flank Pain - Dizziness - Sore Throat   - Cramping   - Tremor - Blurred Vision   - Leg Swelling   - Sensory Change - Double Vision   - Dizzy when standing   - Speech Change - Eye Redness       - Focal Weakness - Dry Eyes       - Loss of Consciousness          Endocrine  Musculoskeletal  Psychiatric   - Cold intolerance - Muscle Pain - Depression   - Heat intolerance - Neck Pain - Insomnia   - Anemia - Joint Pain - Memory Loss   -  Easy bruising, bleeding - Heel pain - Anxiety      Toe Pain        Leg/Ankle/Foot Pain         Objective:     Wt 108.7 kg (239 lb 12 oz)   BMI  37.55 kg/m²   Vitals:    05/28/25 1101   Weight: 108.7 kg (239 lb 12 oz)   PainSc:   6       Physical Exam    General Appearance:   Patient appears well developed, well nourished  Patient appears stated age    Psychiatric:   Patient is oriented to time, place, and person.  Patient has appropriate mood and affect    Neck:  Trachea Midline  No visible masses    Respiratory/Ears:  No distress or labored breathing.  Able to differentiate between normal talking voice and whisper.  Able to follow commands    Eyes:  Visual Acuity intact  Lids and conjunctivae normal. No discoloration noted.    Foot Exam  Physical Exam  Ortho Exam  Ortho/SPM Exam  Foot/Ankle Musculoskeletal Exam    R LE exam con't:  V:  DP 2/4, PT 2/4   CRT< 3s to all digits tested   Tibial and popliteal lymph nodes are w/o abnormality   Edema: absent, varicosities: absent    N:  Patient displays normal ankle reflexes   SILT in SP/DP/T/Destiny/Saph distributions    Ortho: +Motor EHL/FHL/TA/GA   equinus deformity present  There is moderate pain with palpation of R medial calcaneal tubercle  Compartments soft/compressible. No pain on passive stretch of big toe. No calf  Pain.    Derm:  skin intact, skin warm and dry, skin without ulcers or lesions, skin without induration, nails normal, texture turgor well hydrated      Imaging / Labs:      MRI Hindfoot WO Contrast RT  Order: [id number]   Status: Final result       Next appt: 05/30/2025 at 09:30 AM in Obstetrics and Gynecology (Taya Mclean MD)       Dx: Plantar fasciitis of right foot    Test Result Released: Yes (seen)    0 Result Notes  Details    Reading Physician Reading Date Result Priority   Keny Montejo MD  [phone] 4/25/2025 Routine     Narrative & Impression  EXAMINATION:  MRI HINDFOOT WO CONTRAST RT     CLINICAL HISTORY:  Heel pain, chronic;  Plantar fascial fibromatosis     TECHNIQUE:  MRI right ankle performed per routine protocol without contrast.     COMPARISON:  Radiographs  11/25/2024     FINDINGS:  Ligaments: Anterior and posterior inferior tibiofibular ligaments are intact.  Anterior talofibular, calcaneofibular and posterior talofibular ligaments are intact.  Deep and superficial components of deltoid ligament are intact.  Spring ligament complex and Lisfranc ligament are intact.     Tendons: Medial ankle flexor, dorsal ankle extensor, and peroneus tendons are intact.  The Achilles tendon is intact.     Joints: No joint effusions. Tibiotalar and subtalar cartilage maintained.     Bones:  No fracture or infiltrative process.  Note made of fetal vascular remnant in the calcaneus, a normal variant.     Miscellaneous: 1.5 cm ganglion cyst noted posterior to the subtalar joint.  There is thickening of the plantar fascia central cord measuring up to 0.6 cm with signal heterogeneity and plantar calcaneal spur production.  Mild adjacent soft tissue edema noted.  Sinus tarsi and tarsal tunnel are unremarkable.     Impression:     1. Plantar fasciitis.        Electronically signed by:Keny Montejo MD  Date:                                            04/25/2025  Time:                                           12:12        Exam Ended: 04/25/25 09:19 CDT Last Resulted: 04/25/25 12:12 CDT         Note: This was dictated using a computer transcription program. Although proofread, it may contain computer transcription errors and phonetic errors. Other human proofreading errors may also exist. Corrections may be performed at a later time. Please contact us for any clarification if needed.    Fausto Bo DPM  [facility]

## 2025-05-28 NOTE — PATIENT INSTRUCTIONS
Heel Pain (Plantar Fasciitis)        Heel pain is most often caused by plantar fasciitis, a condition that is sometimes also called heel spur syndrome when a spur is present. Heel pain may also be due to other causes, such as a stress fracture, tendonitis, arthritis, nerve irritation or, rarely, a cyst.    Because there are several potential causes, it is important to have heel pain properly diagnosed. A foot and ankle surgeon is able to distinguish between all the possibilities and to determine the underlying source of your heel pain.    What Is Plantar Fasciitis?  Heel pain is often caused by plantar fasciitis  Plantar fasciitis is an inflammation of the band of tissue (the plantar fascia) that extends from the heel to the toes. In this condition, the fascia first becomes irritated and then inflamed, resulting in heel pain.    Causes  The most common cause of plantar fasciitis relates to faulty structure of the foot. For example, people who have problems with their arches, either overly flat feet or high-arched feet, are more prone to developing plantar fasciitis.    Wearing nonsupportive footwear on hard, flat surfaces puts abnormal strain on the plantar fascia and can also lead to plantar fasciitis. This is particularly evident when ones job requires long hours on the feet. Obesity and overuse may also contribute to plantar fasciitis.    Symptoms  The symptoms of plantar fasciitis are:    Pain on the bottom of the heel  Pain in the arch of the foot  Pain that is usually worse upon arising  Pain that increases over a period of months  Swelling on the bottom of the heel     People with plantar fasciitis often describe the pain as worse when they get up in the morning or after they have been sitting for long periods of time. After a few minutes of walking, the pain decreases because walking stretches the fascia. For some people, the pain subsides but returns after spending long periods of time on their  feet.    Diagnosis  To arrive at a diagnosis, the foot and ankle surgeon will obtain your medical history and examine your foot. Throughout this process, the surgeon rules out all possible causes for your heel pain other than plantar fasciitis.    In addition, diagnostic imaging studies, such as x-rays or other imaging modalities, may be used to distinguish the different types of heel pain. Sometimes heel spurs are found in patients with plantar fasciitis, but these are rarely a source of pain. When they are present, the condition may be diagnosed as plantar fasciitis/heel spur syndrome.    Nonsurgical Treatment  Treatment of plantar fasciitis begins with first-line strategies, which you can begin at home:    -Stretching exercises. Exercises that stretch out the calf muscles help ease pain and assist with recovery.  -Avoid going barefoot. When you walk without shoes, you put undue strain and stress on your plantar fascia.  -Ice. Putting an ice pack on your heel for 20 minutes several times a day helps reduce inflammation. Place a thin towel between the ice and your heel; do not apply ice directly to the skin.  -Limit activities. Cut down on extended physical activities to give your heel a rest.  -Shoe modifications. Wearing supportive shoes that have good arch support and a slightly raised heel reduces stress on the plantar fascia.  -Medications. Oral nonsteroidal anti-inflammatory drugs (NSAIDs), such as ibuprofen, may be recommended to reduce pain and inflammation.     If you still have pain after several weeks, see your foot and ankle surgeon, who may add one or more of these treatment approaches:    -Padding, taping and strapping. Placing pads in the shoe softens the impact of walking. Taping and strapping help support the foot and reduce strain on the fascia.  -Orthotic devices. Custom orthotic devices that fit into your shoe help correct the underlying structural abnormalities causing the plantar  fasciitis.  -Injection therapy. In some cases, corticosteroid injections are used to help reduce the inflammation and relieve pain.  -Removable walking cast. A removable walking cast may be used to keep your foot immobile for a few weeks to allow it to rest and heal.  -Night splint. Wearing a night splint allows you to maintain an extended stretch of the plantar fascia while sleeping. This may help reduce the morning pain experienced by some patients.  -Physical therapy. Exercises and other physical therapy measures may be used to help provide relief.     When Is Surgery Needed?  Although most patients with plantar fasciitis respond to nonsurgical treatment, a small percentage of patients may require surgery. If, after several months of nonsurgical treatment, you continue to have heel pain, surgery will be considered. Your foot and ankle surgeon will discuss the surgical options with you and determine which approach would be most beneficial for you.    Long-Term Care  No matter what kind of treatment you undergo for plantar fasciitis, the underlying causes that led to this condition may remain. Therefore, you will need to continue with preventive measures. Wearing supportive shoes, stretching and using custom orthotic devices are the mainstay of long-term treatment for plantar fasciitis.            Understanding Heel Pain  Your heel is the back part of your foot. A band of tissue called the plantar fascia connects the heel bone to the bones in the ball of your foot. Nerves run from the heel up the inside of your ankle and into your leg. When you feel pain in the bottom of your heel, the plantar fascia may be inflamed. Overuse, Achilles tightness, or excess body weight can cause the tissue to tear or pull away from the bone. Sometimes the inflamed plantar fascia also irritates a nerve, causing more pain.    What causes heel pain?  Wearing shoes with poor cushioning can irritate the tissue in your heel (plantar  fascia). Being overweight or standing for long periods can also irritate the tissue. Running, walking, tennis, and other sports that put stress on the heels can cause tiny tears in the tissue. If your lower leg muscles are tight, this is more likely to occur. A tight Achilles tendon will also contribute to heel pain.  Symptoms  You may feel pain on the bottom or on the inside edge of your heel. The pain may be sharp when you get out of bed or when you stand up after sitting for a while. You may feel a dull ache in your heel after youve been standing for a long time on a hard surface. Running can also cause a dull ache.  Preventing future problems  To prevent future heel pain, wear shoes with well-cushioned heels. And do exercises prescribed by your healthcare provider to stretch the plantar fascia and the muscles in the lower leg.   Date Last Reviewed: 9/10/2015  © 2743-6740 [employer]. [address] [place] [address]. All rights reserved. This information is not intended as a substitute for professional medical care. Always follow your healthcare professional's instructions.      Treating Plantar Fasciitis  First, your healthcare provider tries to determine the cause of your problem in order to suggest ways to relieve pain. If your pain is due to poor foot mechanics, custom-made shoe inserts (orthoses) may help.    Reduce symptoms  To relieve mild symptoms, try aspirin, ibuprofen, or other medicines as directed. Rubbing ice on the affected area may also help.  To reduce severe pain and swelling, your healthcare provider may prescribe pills or injections or a walking cast in some instances. Physical therapy, such as ultrasound or a daily stretching program, may also be recommended. Surgery is rarely required.  To reduce symptoms caused by poor foot mechanics, your foot may be taped. This supports the arch and temporarily controls movement. Night splints may also help by stretching the  fascia.  Control movement  If taping helps, your healthcare provider may prescribe orthoses. Built from plaster casts of your feet, these inserts control the way your foot moves. As a result, your symptoms should go away.  Reduce overuse  Every time your foot strikes the ground, the plantar fascia is stretched. You can reduce the strain on the plantar fascia and the possibility of overuse by following these suggestions:  Lose any excess weight.  Avoid running on hard or uneven ground.  Use orthoses at all times in your shoes and house slippers.  If surgery is needed  Your healthcare provider may consider surgery if other types of treatment don't control your pain. During surgery, the plantar fascia is partially cut to release tension. As you heal, fibrous tissue fills the space between the heel bone and the plantar fascia.   Date Last Reviewed: 10/14/2015  © 4872-8742 [employer]. [address] [place] [address]. All rights reserved. This information is not intended as a substitute for professional medical care. Always follow your healthcare professional's instructions.      Equinus          What Is Equinus?    Equinus is a condition in which the upward bending motion of the ankle joint is limited. Someone with equinus lacks the flexibility to bring the top of the foot toward the front of the leg. Equinus can occur in one or both feet. When it involves both feet, the limitation of motion is sometimes worse in one foot than in the other.    People with equinus develop ways to compensate for their limited ankle motion, and this often leads to other foot, leg or back problems. The most common methods of compensation are flattening of the arch or picking up the heel early when walking, placing increased pressure on the ball of the foot. Other patients compensate by toe walking, while a smaller number take steps by bending abnormally at the hip or knee.    Causes  There are several possible causes  for the limited range of ankle motion. Often, it is due to tightness in the Achilles tendon or calf muscles (the soleus muscle and/or gastrocnemius muscle). In some patients, this tightness is congenital (present at birth), and sometimes it is an inherited trait. Other patients acquire the tightness from being in a cast, being on crutches or frequently wearing high-heeled shoes. In addition, diabetes can affect the fibers of the Achilles tendon and cause tightness. Sometimes equinus is related to a bone blocking the ankle motion. For example, a fragment of a broken bone following an ankle injury, or bone block, can get in the way and restrict motion. Equinus may also result from one leg being shorter than the other. Less often, equinus is caused by spasms in the calf muscle. These spasms may be signs of an underlying neurologic disorder.      Foot Problems Related to Equinus  Depending on how a patient compensates for the inability to bend properly at the ankle, a variety of foot conditions can develop, including:    Plantar fasciitis (arch/heel pain)  Calf cramping  Tendonitis (inflammation in the Achilles tendon)  Metatarsalgia (pain and/or callusing on the ball of the foot)  Flatfoot  Arthritis of the midfoot (middle area of the foot)  Pressure sores on the ball of the foot or the arch  Bunions and hammertoes  Ankle pain  Shin splints     Diagnosis  Most patients with equinus are unaware they have this condition when they first visit the doctor. Instead, they come to the doctor seeking relief for foot problems associated with equinus.    To diagnose equinus, the foot and ankle surgeon will evaluate the ankle's range of motion when the knee is flexed (bent) as well as extended (straightened). This enables the surgeon to identify whether the tendon or muscle is tight and to assess whether bone is interfering with ankle motion. X-rays may also be ordered. In some cases, the foot and ankle surgeon may refer the  patient for neurologic evaluation.    Nonsurgical Treatment  Treatment includes strategies aimed at relieving the symptoms and conditions associated with equinus. In addition, the patient is treated for the equinus itself through one or more of the following options:    Night splint. The foot may be placed in a splint at night to keep it in a position that helps reduce tightness of the calf muscle.  Heel lifts. Placing heel lifts inside the shoes or wearing shoes with a moderate heel takes stress off the Achilles tendon when walking and may reduce symptoms.  Arch supports or orthotic devices. Custom orthotic devices that fit into the shoe are often prescribed to keep weight distributed properly and to help control muscle/tendon imbalance.  Physical therapy. To help remedy muscle tightness, exercises that stretch the calf muscle(s) are recommended.     When Is Surgery Needed?  In some cases, surgery may be needed to correct the cause of equinus if it is related to a tight tendon or a bone blocking the ankle motion. The foot and ankle surgeon will determine the type of procedure that is best suited to the individual patient.                Ankle Dorsiflexion/Plantarflexion (Flexibility)    Sit on the floor or in bed with your legs straight in front of you.  Point both feet. Then flex both feet.  Do this 10 to 30 times in a row.  Repeat this exercise 2 times a day, or as instructed.  Date Last Reviewed: 5/1/2016 © 2000-2016 [employer]. [address] [place] [address]. All rights reserved. This information is not intended as a substitute for professional medical care. Always follow your healthcare professional's instructions.          Arch retraining    These exercises are for your right foot. Switch sides for your left foot.  Sit in a chair or stand with both feet flat on the floor. Press down with the ball of your right foot, but only on the left side of the foot, just under the big  toe.  Then pull the bottom of your big toe back toward your heel. This should pull up the arch of your foot. Dont flex your toes while doing this. It is a subtle movement of the arch.  Hold for 5 seconds. Relax.  Date Last Reviewed: 3/10/2016  © 3170-8335 [employer]. [address] [place] [address]. All rights reserved. This information is not intended as a substitute for professional medical care. Always follow your healthcare professional's instructions.        Soleus Stretch (Flexibility)    Stand facing a wall from 3 feet away. Take one step toward the wall with your right foot.  Place both palms on the wall. Bend both knees and lean forward. Keep both heels on the floor.  Hold for 30 to 60 seconds. Then relax both legs. Repeat the exercise 2 times.  Switch legs and repeat.  Repeat this exercise 3 times a day, or as instructed.     Tip: Dont bounce while youre stretching.   Date Last Reviewed: 3/10/2016  © 0776-2755 [employer]. [address] [place] [address]. All rights reserved. This information is not intended as a substitute for professional medical care. Always follow your healthcare professional's instructions.          Recommended OTC orthotics:  -powerstep  -superfeet    Recommended shoegear:  -new balance  -ascics  -olvin guerrero

## 2025-05-29 ENCOUNTER — PATIENT MESSAGE (OUTPATIENT)
Dept: PODIATRY | Facility: CLINIC | Age: 40
End: 2025-05-29
Payer: COMMERCIAL

## 2025-05-29 ENCOUNTER — HOSPITAL ENCOUNTER (OUTPATIENT)
Dept: RADIOLOGY | Facility: HOSPITAL | Age: 40
Discharge: HOME | End: 2025-05-29
Attending: PODIATRIST
Payer: COMMERCIAL

## 2025-05-29 DIAGNOSIS — M72.2 PLANTAR FASCIITIS OF RIGHT FOOT: ICD-10-CM

## 2025-05-29 DIAGNOSIS — M21.869 ACQUIRED POSTERIOR EQUINUS, UNSPECIFIED LATERALITY: ICD-10-CM

## 2025-05-29 PROCEDURE — 71046 X-RAY EXAM CHEST 2 VIEWS: CPT | Mod: 26,,, | Performed by: RADIOLOGY

## 2025-05-29 PROCEDURE — 71046 X-RAY EXAM CHEST 2 VIEWS: CPT | Mod: TC,FY

## 2025-05-30 ENCOUNTER — OFFICE VISIT (OUTPATIENT)
Facility: CLINIC | Age: 40
End: 2025-05-30
Payer: COMMERCIAL

## 2025-05-30 VITALS
WEIGHT: 242.63 LBS | BODY MASS INDEX: 38.08 KG/M2 | SYSTOLIC BLOOD PRESSURE: 108 MMHG | HEIGHT: 67 IN | DIASTOLIC BLOOD PRESSURE: 80 MMHG

## 2025-05-30 DIAGNOSIS — Z01.419 WELL WOMAN EXAM WITH ROUTINE GYNECOLOGICAL EXAM: Primary | ICD-10-CM

## 2025-05-30 PROCEDURE — 99999 PR PBB SHADOW E&M-EST. PATIENT-LVL III: CPT | Mod: PBBFAC,,, | Performed by: STUDENT IN AN ORGANIZED HEALTH CARE EDUCATION/TRAINING PROGRAM

## 2025-05-30 RX ORDER — TIRZEPATIDE 12.5 MG/.5ML
INJECTION, SOLUTION SUBCUTANEOUS
COMMUNITY
Start: 2025-04-30

## 2025-05-30 RX ORDER — ATOMOXETINE 18 MG/1
18 CAPSULE ORAL
COMMUNITY

## 2025-05-30 NOTE — PROGRESS NOTES
CC: Well woman exam    HPI:  Michelle Messer is a 39 y.o. female  presents for a well woman exam.  She has no issues, problems, or complaints. Having surgery in July would like to restart letrazole and do progesterone as well after that time. Starting grad school next week and excited       Patient history:   Past Medical History:   Diagnosis Date    Allergy     Asthma     Eczema     Hiatal hernia 2009    Hypertension      Past Surgical History:   Procedure Laterality Date    LAPAROSCOPY  2022    endometriosis    TONSILLECTOMY       OB History    Para Term  AB Living   2 0 0 0 2 0   SAB IAB Ectopic Multiple Live Births   1 0 0 0 0      # Outcome Date GA Lbr Johnny/2nd Weight Sex Type Anes PTL Lv   2 SAB 23           1 AB                GYN  Menopausal: No  History of abnormal paps: DENIES  Abnormal or postmenopausal bleeding: DENIES  History of abnormal mammograms:N/A   Family history of breast or ovarian cancer: DENIES  Any breast masses, pain, skin changes, or nipple discharge: DENIES  Possible recent STD exposure: denies  Contraception: None    Pap: 2024, NILM/HPV(-)  Mammogram: BIRADS1, T-C=14.76% 2024      Family History   Problem Relation Name Age of Onset    Heart failure Paternal Grandmother Layla Melohn     Heart disease Paternal Grandmother Layla Melohn     Heart disease Maternal Grandmother Lise Cazares     Vision loss Maternal Grandmother Lise Cazares     Colon cancer Maternal Grandfather      Leukemia Maternal Grandfather      Hypertension Father Gunner Melohn     Asthma Father Gunner Melohn     Allergies Father Gunner Melohn     Diabetes Father Gunner Melohn     Rheum arthritis Mother Sarita Melohn     Asthma Mother Sarita Melohn     Heart failure Mother Sarita Melohn     Hypertension Mother Sarita Melohn     Allergies Mother Sarita Melohn     Eczema Mother Sarita Melohn     Arthritis Mother Sarita Melohn     Depression Mother Sarita Melohn     Kidney disease Mother Sarita Melohn      Miscarriages / Stillbirths Mother Sarita Rutledge     Mental illness Mother Sarita Rutledge         Depression & Anxiety    Breast cancer Neg Hx      Ovarian cancer Neg Hx      Allergic rhinitis Neg Hx      Angioedema Neg Hx      Atopy Neg Hx      Immunodeficiency Neg Hx      Rhinitis Neg Hx      Urticaria Neg Hx       Social History     Tobacco Use    Smoking status: Former     Current packs/day: 0.00     Average packs/day: 0.5 packs/day for 15.0 years (7.5 ttl pk-yrs)     Types: Cigarettes     Start date: 2004     Quit date: 4/10/2018     Years since quittin.1     Passive exposure: Never    Smokeless tobacco: Never   Substance Use Topics    Alcohol use: Not Currently    Drug use: No     Allergies: Silica gel and Erythromycin    Current Outpatient Medications:     atomoxetine (STRATTERA) 18 MG capsule, Take 18 mg by mouth., Disp: , Rfl:     atomoxetine (STRATTERA) 60 MG capsule, Take 60 mg by mouth once daily., Disp: , Rfl:     busPIRone (BUSPAR) 7.5 MG tablet, Take 7.5 mg by mouth 2 (two) times daily., Disp: , Rfl:     FLOVENT HFA 44 mcg/actuation inhaler, INL 2 PFS PO BID, Disp: 10.6 g, Rfl: 3    gabapentin (NEURONTIN) 100 MG capsule, TAKE 1 CAPSULE(100 MG) BY MOUTH EVERY EVENING, Disp: 30 capsule, Rfl: 11    labetaloL (NORMODYNE) 200 MG tablet, TAKE 1 TABLET(200 MG) BY MOUTH TWICE DAILY, Disp: 180 tablet, Rfl: 0    levalbuterol (XOPENEX) 1.25 mg/3 mL nebulizer solution, Take 3 mLs by nebulization 3 (three) times daily., Disp: 60 each, Rfl: 3    mirtazapine (REMERON) 7.5 MG Tab, Take 7.5 mg by mouth every evening., Disp: , Rfl:     NIFEdipine (PROCARDIA-XL) 60 MG (OSM) 24 hr tablet, TAKE 1 TABLET BY MOUTH TWICE DAILY, Disp: 180 tablet, Rfl: 0    sertraline (ZOLOFT) 100 MG tablet, Take 100 mg by mouth., Disp: , Rfl:     ZEPBOUND 12.5 mg/0.5 mL PnIj, SMARTSI.5 Milligram(s) SUB-Q Once a Week, Disp: , Rfl:     albuterol (PROAIR HFA) 90 mcg/actuation inhaler, Inhale 2 puffs into the lungs every 6 (six) hours as  "needed for Wheezing. Rescue, Disp: 18 g, Rfl: 2    mupirocin (BACTROBAN) 2 % ointment, Apply topically 3 (three) times daily. To folliculitis bumps (Patient not taking: Reported on 5/30/2025), Disp: 15 g, Rfl: 1       ROS:  GENERAL: Denies weight gain or weight loss. Feeling well overall.   SKIN: Denies rash or lesions.   HEAD: Denies head injury or headache.   NODES: Denies enlarged lymph nodes.   CHEST: Denies chest pain or shortness of breath.   CARDIOVASCULAR: Denies palpitations or left sided chest pain.   ABDOMEN: No abdominal pain, constipation, diarrhea, nausea, vomiting or rectal bleeding.   URINARY: No frequency, dysuria, hematuria, or burning on urination.  REPRODUCTIVE: See HPI.   BREASTS: The patient performs breast self-examination and denies pain, lumps, or nipple discharge.   HEMATOLOGIC: No easy bruisability or excessive bleeding.  MUSCULOSKELETAL: Denies joint pain or swelling.   NEUROLOGIC: Denies syncope or weakness.   PSYCHIATRIC: Denies depression, anxiety or mood swings.    Objective:   /80   Ht 5' 7" (1.702 m)   Wt 110.1 kg (242 lb 9.9 oz)   LMP 05/12/2025 (Approximate)   BMI 38.00 kg/m²       Physical Exam:  APPEARANCE: Well nourished, well developed, in no acute distress.  AFFECT: WNL, alert and oriented x 3  SKIN: No acne or hirsutism  NECK: Neck symmetric without masses or thyromegaly  CHEST: Good respiratory effect  ABDOMEN: Soft.  No tenderness or masses.  No hepatosplenomegaly.  No hernias.  BREASTS: Symmetrical, no skin changes or visible lesions.  No palpable masses, nipple discharge bilaterally. Small white round skin spot at 8 oclock position 1cm from nipple, benign skin changes no underlying mass   PELVIC: Normal external genitalia without lesions.  Normal hair distribution.  Adequate perineal body, normal urethral meatus.  Vagina moist and well rugated without lesions or discharge.  Cervix pink, without lesions, discharge or tenderness.  No significant cystocele or " rectocele.   EXTREMITIES: No edema.    ASSESSMENT AND PLAN  1. Well woman exam with routine gynecological exam  Liquid-Based Pap Smear, Screening            Annual exam  Breast and pelvic exam: wnl  Patient counseled on ASCCP guidelines for cervical cytology screening  Cervical screenin NILM HPV neg, pap done today   Patient counseled on current recommendations for breast cancer screening  Mammogram screening: due Dec 2024  STD testing: not requested today   continue PNV, will restart letrazole/progesterone when ready will send me message  Osteoporosis screening at 64      She was counseled to follow up with her PCP for other routine health maintenance      Follow up in about 1 year (around 2026).      Taya Mclean MD  OBGYN [facility]

## 2025-06-05 ENCOUNTER — RESULTS FOLLOW-UP (OUTPATIENT)
Dept: OBSTETRICS AND GYNECOLOGY | Facility: CLINIC | Age: 40
End: 2025-06-05

## 2025-06-13 ENCOUNTER — TELEPHONE (OUTPATIENT)
Dept: PODIATRY | Facility: CLINIC | Age: 40
End: 2025-06-13
Payer: COMMERCIAL

## 2025-06-13 NOTE — TELEPHONE ENCOUNTER
Phone call made to the patient with her concerns on leave of absence paper work, it was explained to the patient that normally this type of paper work is usually filled out the week of surgery, but she will be able to  her paper work on Tuesday.

## 2025-06-22 ENCOUNTER — PATIENT MESSAGE (OUTPATIENT)
Dept: ADMINISTRATIVE | Facility: OTHER | Age: 40
End: 2025-06-22
Payer: COMMERCIAL

## 2025-06-25 ENCOUNTER — PATIENT MESSAGE (OUTPATIENT)
Dept: PRIMARY CARE CLINIC | Facility: CLINIC | Age: 40
End: 2025-06-25
Payer: COMMERCIAL

## 2025-06-25 ENCOUNTER — TELEPHONE (OUTPATIENT)
Dept: PRIMARY CARE CLINIC | Facility: CLINIC | Age: 40
End: 2025-06-25
Payer: COMMERCIAL

## 2025-06-25 NOTE — TELEPHONE ENCOUNTER
Copied from CRM #[id number]. Topic: General Inquiry - Patient Advice  >> Jun 25, 2025  2:02 PM Citlaly wrote:  Patient would like a call back regarding  the link that was sent to her for Scheduling  Appt    Please call  Phone [phone]  Thank You

## 2025-06-26 ENCOUNTER — PATIENT MESSAGE (OUTPATIENT)
Dept: PRIMARY CARE CLINIC | Facility: CLINIC | Age: 40
End: 2025-06-26
Payer: COMMERCIAL

## 2025-06-27 ENCOUNTER — PATIENT OUTREACH (OUTPATIENT)
Dept: ADMINISTRATIVE | Facility: HOSPITAL | Age: 40
End: 2025-06-27
Payer: COMMERCIAL

## 2025-07-01 ENCOUNTER — OFFICE VISIT (OUTPATIENT)
Dept: PRIMARY CARE CLINIC | Facility: CLINIC | Age: 40
End: 2025-07-01
Payer: COMMERCIAL

## 2025-07-01 VITALS
BODY MASS INDEX: 38.1 KG/M2 | HEART RATE: 79 BPM | HEIGHT: 67 IN | WEIGHT: 242.75 LBS | RESPIRATION RATE: 18 BRPM | DIASTOLIC BLOOD PRESSURE: 82 MMHG | SYSTOLIC BLOOD PRESSURE: 124 MMHG | OXYGEN SATURATION: 98 %

## 2025-07-01 DIAGNOSIS — M72.2 PLANTAR FASCIITIS OF RIGHT FOOT: Primary | ICD-10-CM

## 2025-07-01 DIAGNOSIS — Z01.818 PREOP EXAM FOR INTERNAL MEDICINE: ICD-10-CM

## 2025-07-01 PROCEDURE — 99999 PR PBB SHADOW E&M-EST. PATIENT-LVL V: CPT | Mod: PBBFAC,,, | Performed by: INTERNAL MEDICINE

## 2025-07-01 NOTE — PROGRESS NOTES
[facility] Note    Chief Complaint      Chief Complaint   Patient presents with    Pre-op Exam       History of Present Illness      Michelle Messer is a 39 y.o. female who presents today for   Chief Complaint   Patient presents with    Pre-op Exam   .  Patient comes to appointment here for preop exam for right foot fasciotomy with Dr Bo . She has good functional status can fo up flight of steps or walk block with no chest pain or die . She is scheduled for EKG with anesthesia tomorrow     Problem List Items Addressed This Visit       Plantar fasciitis of right foot - Primary    Overview   For corrective procedure with podiatry 7/11          Preop exam for internal medicine    Overview   Pt with good functional status   Cleared for procedure with low risk cardiac complications pending EKG tomorrow               Past Medical History:  Past Medical History:   Diagnosis Date    Allergy     Asthma     Eczema     Hiatal hernia 2009    Hypertension        Past Surgical History:  Past Surgical History:   Procedure Laterality Date    LAPAROSCOPY  11/2022    endometriosis    TONSILLECTOMY         Family History:  family history includes Allergies in her father and mother; Arthritis in her mother; Asthma in her father and mother; Colon cancer in her maternal grandfather; Depression in her mother; Diabetes in her father; Eczema in her mother; Heart disease in her maternal grandmother and paternal grandmother; Heart failure in her mother and paternal grandmother; Hypertension in her father and mother; Kidney disease in her mother; Leukemia in her maternal grandfather; Mental illness in her mother; Miscarriages / Stillbirths in her mother; Rheum arthritis in her mother; Vision loss in her maternal grandmother.    Social History:  Social History[1]    Review of Systems:   Review of Systems   Constitutional:  Negative for fever and weight loss.   HENT:  Negative for congestion, hearing loss and sore  "throat.    Eyes:  Negative for blurred vision.   Respiratory:  Negative for cough and shortness of breath.    Cardiovascular:  Negative for chest pain, palpitations, claudication and leg swelling.   Gastrointestinal:  Negative for abdominal pain, constipation, diarrhea, heartburn and nausea.   Genitourinary:  Negative for dysuria.   Musculoskeletal:  Negative for back pain and myalgias.   Skin:  Negative for rash.   Neurological:  Negative for focal weakness and headaches.   Psychiatric/Behavioral:  Negative for depression and suicidal ideas. The patient is not nervous/anxious.          Medications:  Encounter Medications[2]     Allergies:  Review of patient's allergies indicates:   Allergen Reactions    Silica gel Dermatitis    Erythromycin Rash         Physical Exam         Vitals:    07/01/25 1527   BP: 124/82   Pulse: 79   Resp: 18         Physical Exam  Constitutional:       Appearance: She is well-developed.   Eyes:      Pupils: Pupils are equal, round, and reactive to light.   Neck:      Thyroid: No thyromegaly.   Cardiovascular:      Rate and Rhythm: Normal rate.      Heart sounds: Normal heart sounds. No murmur heard.     No friction rub. No gallop.   Pulmonary:      Breath sounds: Normal breath sounds.   Abdominal:      General: Bowel sounds are normal.      Palpations: Abdomen is soft.   Musculoskeletal:         General: Normal range of motion.      Cervical back: Normal range of motion.   Lymphadenopathy:      Cervical: No cervical adenopathy.   Skin:     General: Skin is warm.      Findings: No rash.   Neurological:      Mental Status: She is alert and oriented to person, place, and time.      Cranial Nerves: No cranial nerve deficit.   Psychiatric:         Behavior: Behavior normal.          Laboratory:  CBC:  No results for input(s): "WBC", "RBC", "HGB", "HCT", "PLT", "MCV", "MCH", "MCHC" in the last 2160 hours.  CMP:  No results for input(s): "GLU", "CALCIUM", "ALBUMIN", "PROT", "NA", "K", "CO2", " ""CL", "BUN", "ALKPHOS", "ALT", "AST", "BILITOT" in the last 2160 hours.    Invalid input(s): "CREATININ"  URINALYSIS:  No results for input(s): "COLORU", "CLARITYU", "SPECGRAV", "PHUR", "PROTEINUA", "GLUCOSEU", "BILIRUBINCON", "BLOODU", "WBCU", "RBCU", "BACTERIA", "MUCUS", "NITRITE", "LEUKOCYTESUR", "UROBILINOGEN", "HYALINECASTS" in the last 2160 hours.   LIPIDS:  No results for input(s): "TSH", "HDL", "CHOL", "TRIG", "LDLCALC", "CHOLHDL", "NONHDLCHOL", "TOTALCHOLEST" in the last 2160 hours.  TSH:  No results for input(s): "TSH" in the last 2160 hours.  A1C:  No results for input(s): "HGBA1C" in the last 2160 hours.    Radiology:        Assessment:     Michelle Messer is a 39 y.o.female with:    Plantar fasciitis of right foot    Preop exam for internal medicine          Plan:     Problem List Items Addressed This Visit       Plantar fasciitis of right foot - Primary    Overview   For corrective procedure with podiatry           Preop exam for internal medicine    Overview   Pt with good functional status   Cleared for procedure with low risk cardiac complications pending EKG tomorrow             As above, continue current medications and maintain follow up with specialists.  Return to clinic as scheduled       [facility]er[facility] W Dantagnan [facility]                       [1]   Social History  Socioeconomic History    Marital status:    Tobacco Use    Smoking status: Former     Current packs/day: 0.00     Average packs/day: 0.5 packs/day for 15.0 years (7.5 ttl pk-yrs)     Types: Cigarettes     Start date: 2004     Quit date: 4/10/2018     Years since quittin.2     Passive exposure: Never    Smokeless tobacco: Never   Substance and Sexual Activity    Alcohol use: Never    Drug use: Never    Sexual activity: Yes     Partners: Male     Birth control/protection: None   Social History Narrative    Tobacco: Intermittent use; once per month has 7 cigarettes; Used ages 19 " yo-34 yo; max use 0.5 ppd    EtOH: Twice per week, 2 drinks at one time    Drug: None    Employment: Teacher ()     Education: Bachelor's     Lives with  and 10 yo step-son      Social Drivers of Health     Financial Resource Strain: Low Risk  (6/28/2024)    Overall Financial Resource Strain (CARDIA)     Difficulty of Paying Living Expenses: Not very hard   Food Insecurity: No Food Insecurity (6/28/2024)    Hunger Vital Sign     Worried About Running Out of Food in the Last Year: Never true     Ran Out of Food in the Last Year: Never true   Transportation Needs: No Transportation Needs (1/25/2022)    PRAPARE - Transportation     Lack of Transportation (Medical): No     Lack of Transportation (Non-Medical): No   Physical Activity: Insufficiently Active (6/28/2024)    Exercise Vital Sign     Days of Exercise per Week: 1 day     Minutes of Exercise per Session: 10 min   Stress: Stress Concern Present (6/28/2024)    [employer] [place] [employer] - Occupational Stress Questionnaire     Feeling of Stress : To some extent   Housing Stability: Low Risk  (1/25/2022)    Housing Stability Vital Sign     Unable to Pay for Housing in the Last Year: No     Number of Places Lived in the Last Year: 2     Unstable Housing in the Last Year: No   [2]   Outpatient Encounter Medications as of 7/1/2025   Medication Sig Note Dispense Refill    albuterol (PROAIR HFA) 90 mcg/actuation inhaler Inhale 2 puffs into the lungs every 6 (six) hours as needed for Wheezing. Rescue  18 g 2    atomoxetine (STRATTERA) 18 MG capsule Take 18 mg by mouth.       atomoxetine (STRATTERA) 60 MG capsule Take 60 mg by mouth once daily.       busPIRone (BUSPAR) 7.5 MG tablet Take 7.5 mg by mouth every evening.       calcium carbonate/vitamin D3 (VITAMIN D-3 ORAL) Take 125 mcg by mouth Daily.       CRANBERRY ORAL Take 15,000 mg by mouth Daily.       cyanocobalamin, vitamin B-12, (VITAMIN B-12 ORAL) Take 3,000 mcg by mouth Daily.        docosahexaenoic acid (DHA PRENATAL ORAL) Take 2 tablets by mouth Daily.       ferrous sulfate (IRON ORAL) Take 1 tablet by mouth Daily.       FLOVENT HFA 44 mcg/actuation inhaler INL 2 PFS PO BID 2025: Not using 10.6 g 3    gabapentin (NEURONTIN) 100 MG capsule TAKE 1 CAPSULE(100 MG) BY MOUTH EVERY EVENING  30 capsule 11    labetaloL (NORMODYNE) 200 MG tablet TAKE 1 TABLET(200 MG) BY MOUTH TWICE DAILY  180 tablet 0    levalbuterol (XOPENEX) 1.25 mg/3 mL nebulizer solution Take 3 mLs by nebulization 3 (three) times daily.  60 each 3    mirtazapine (REMERON) 7.5 MG Tab Take 7.5 mg by mouth every evening.       multivit with min-folic acid 200 mcg Chew Take 2 tablets by mouth Daily.       mupirocin (BACTROBAN) 2 % ointment Apply topically 3 (three) times daily. To folliculitis bumps  15 g 1    mv-mn/C/glutamin/lysin/kfmi762 (AIRBORNE, ASCORBATE SODIUM, ORAL) Take 750 mg by mouth Daily.       NIFEdipine (PROCARDIA-XL) 60 MG (OSM) 24 hr tablet TAKE 1 TABLET BY MOUTH TWICE DAILY  180 tablet 0    omega-3s/dha/epa/fish oil (OMEGA 3 ORAL) Take 1 capsule by mouth Daily.       sertraline (ZOLOFT) 100 MG tablet Take 100 mg by mouth once daily.       ubidecarenone (COQ-10 ORAL) Take 200 mg by mouth Daily.       ZEPBOUND 12.5 mg/0.5 mL PnIj SMARTSI.5 Milligram(s) SUB-Q Once a Week 2025: On hold, last dose 25       No facility-administered encounter medications on file as of 2025.

## 2025-07-07 ENCOUNTER — PATIENT MESSAGE (OUTPATIENT)
Dept: CARDIOLOGY | Facility: CLINIC | Age: 40
End: 2025-07-07
Payer: COMMERCIAL

## 2025-07-07 ENCOUNTER — TELEPHONE (OUTPATIENT)
Dept: PRIMARY CARE CLINIC | Facility: CLINIC | Age: 40
End: 2025-07-07
Payer: COMMERCIAL

## 2025-07-07 NOTE — TELEPHONE ENCOUNTER
----- Message from Med Assistant Vines sent at 7/7/2025  8:12 AM CDT -----  Regarding: FW: Medical clearance  Do you know if Dr. WAGGONER cleared before he left?  ----- Message -----  From: Stefania Gutierrez RN  Sent: 7/7/2025   7:51 AM CDT  To: Marcia Michaels RN; Paul Echavarria#  Subject: Medical clearance                                Good morning,    Mrs. Messer is scheduled for surgery on 7/11/25 @ [facility]. She saw Dr. Aguilar for clearance on 7/1/25, MD stated he would clear her pending her EKG results.  EKG performed on 7/2/25.  Can you guys please review the EKG let us know if pt is ok to proceed with surgery.    Thanks in advance,  ERLIN Aguiar  [facility]

## 2025-07-07 NOTE — TELEPHONE ENCOUNTER
EKG is abnormal, needs cardiology input  Can do econsult to get an answer quicker, please send below to get permission from patient    Michelle Messer    Based on your condition/symptom, I will need recommendations from a specialist in (Specialty) to determine the best way to manage your care. [facility] has enabled the opportunity for me to eConsult with and get a recommendation directly from my one of my colleagues in cardiology on your behalf. This would provide me with a specialists recommendation in 3 business days without you having to schedule and travel to an additional office for a visit with a specialist.    If you consent to this, I will send the consult request to the specialist along with my notes. The specialist will then:     review your medical history, all pertinent information, and labs, xrays, etc.   make recommendations back to me on next steps in your care within 3 business days     Most often I can take the recommendations from the specialist and communicate with you directly through a [facility] message or by phone so that we can manage the next steps together.  Sometimes, he or she may recommend seeing you virtually or in person and we would then place a traditional referral and schedule your visit.    Most insurances cover this, however, requesting an eConsult on your behalf could result in you needing to pay a co-pay. No matter what it would not be more than you pay if you were seeing the specialist in person.      I need your permission verbally to place the eConsult to a specialist in cardiology. Let me know if you agree with this approach.     For additional information on the eConsult process: What is an eConsult?

## 2025-07-07 NOTE — TELEPHONE ENCOUNTER
Dr. Aguilar is not here and surgery is Friday. Are you able to go back to the visit and state if patient is cleared or not for surgery.

## 2025-07-08 ENCOUNTER — TELEPHONE (OUTPATIENT)
Dept: PODIATRY | Facility: CLINIC | Age: 40
End: 2025-07-08
Payer: COMMERCIAL

## 2025-07-08 ENCOUNTER — OFFICE VISIT (OUTPATIENT)
Dept: CARDIOLOGY | Facility: CLINIC | Age: 40
End: 2025-07-08
Payer: COMMERCIAL

## 2025-07-08 VITALS
BODY MASS INDEX: 37.82 KG/M2 | HEIGHT: 67 IN | WEIGHT: 240.94 LBS | DIASTOLIC BLOOD PRESSURE: 82 MMHG | HEART RATE: 76 BPM | OXYGEN SATURATION: 98 % | SYSTOLIC BLOOD PRESSURE: 127 MMHG

## 2025-07-08 DIAGNOSIS — J45.20 MILD INTERMITTENT ASTHMA WITHOUT COMPLICATION: ICD-10-CM

## 2025-07-08 DIAGNOSIS — G47.33 OBSTRUCTIVE SLEEP APNEA SYNDROME: ICD-10-CM

## 2025-07-08 DIAGNOSIS — E66.813 CLASS 3 SEVERE OBESITY DUE TO EXCESS CALORIES WITH SERIOUS COMORBIDITY AND BODY MASS INDEX (BMI) OF 40.0 TO 44.9 IN ADULT: ICD-10-CM

## 2025-07-08 DIAGNOSIS — Z01.810 PREOPERATIVE CARDIOVASCULAR EXAMINATION: ICD-10-CM

## 2025-07-08 DIAGNOSIS — R94.31 NONSPECIFIC ABNORMAL ELECTROCARDIOGRAM (ECG) (EKG): Primary | ICD-10-CM

## 2025-07-08 DIAGNOSIS — I10 BENIGN HYPERTENSION: ICD-10-CM

## 2025-07-08 DIAGNOSIS — F33.0 DEPRESSION, MAJOR, RECURRENT, MILD: ICD-10-CM

## 2025-07-08 DIAGNOSIS — E78.2 MIXED HYPERLIPIDEMIA: ICD-10-CM

## 2025-07-08 DIAGNOSIS — E66.01 CLASS 3 SEVERE OBESITY DUE TO EXCESS CALORIES WITH SERIOUS COMORBIDITY AND BODY MASS INDEX (BMI) OF 40.0 TO 44.9 IN ADULT: ICD-10-CM

## 2025-07-08 PROCEDURE — 3008F BODY MASS INDEX DOCD: CPT | Mod: CPTII,S$GLB,,

## 2025-07-08 PROCEDURE — 99214 OFFICE O/P EST MOD 30 MIN: CPT | Mod: S$GLB,,,

## 2025-07-08 PROCEDURE — 1160F RVW MEDS BY RX/DR IN RCRD: CPT | Mod: CPTII,S$GLB,,

## 2025-07-08 PROCEDURE — 3074F SYST BP LT 130 MM HG: CPT | Mod: CPTII,S$GLB,,

## 2025-07-08 PROCEDURE — 3044F HG A1C LEVEL LT 7.0%: CPT | Mod: CPTII,S$GLB,,

## 2025-07-08 PROCEDURE — 1159F MED LIST DOCD IN RCRD: CPT | Mod: CPTII,S$GLB,,

## 2025-07-08 PROCEDURE — 99999 PR PBB SHADOW E&M-EST. PATIENT-LVL IV: CPT | Mod: PBBFAC,,,

## 2025-07-08 PROCEDURE — 3079F DIAST BP 80-89 MM HG: CPT | Mod: CPTII,S$GLB,,

## 2025-07-08 NOTE — ASSESSMENT & PLAN NOTE
Body mass index is 37.74 kg/m². Morbid obesity complicates all aspects of disease management from diagnostic modalities to treatment. Weight loss encouraged and health benefits explained to patient.   -currently on Zepbound and doing well

## 2025-07-08 NOTE — PROGRESS NOTES
Subjective:    Patient ID:  Michelle Messer is a 39 y.o. female who presents for evaluation of Follow-up (Pre-operative clearance )      PCP: Paul Aguilar MD     Referring Provider: Taya Mclean MD    HPI: patient is a 39 yo F w/PMH of HTN, obesity, asthma, MARCO ANTONIO,  who presents today for pre-op cardiac evaluation  for right foot fasciotomy with Dr Bo on 2025. Patient was last sen on 2024 for f/u appt, HTN management and was continued on medical therapy. Patient was scheduled for 3 month follow up but missed the appts.   Patient has  completed home sleep study and now uses CPAP nightly.  Patient denies CP, SOB, palpitations, orthopnea, PND, presyncope, LOC, swelling, or claudication. She notes snoring, denies PND. Patient monitors home BP and is enrolled in digital medicine BP ranges 120-130/70s w medical therapy. She is currently on Zepbound  for weight loss management. Patient reports medication compliance without side effects. Patient  was exercising but has stopped 2/2 foot pain .     NM stress test to evaluate for ischemia, patient unable to walk due to severe foot pain and planned surgery.     Past Medical History:   Diagnosis Date    Allergy     Asthma     Eczema     Hiatal hernia     Hypertension      Past Surgical History:   Procedure Laterality Date    LAPAROSCOPY  2022    endometriosis    TONSILLECTOMY       Social History     Socioeconomic History    Marital status:    Tobacco Use    Smoking status: Former     Current packs/day: 0.00     Average packs/day: 0.5 packs/day for 15.0 years (7.5 ttl pk-yrs)     Types: Cigarettes     Start date: 2004     Quit date: 4/10/2018     Years since quittin.2     Passive exposure: Never    Smokeless tobacco: Never   Substance and Sexual Activity    Alcohol use: Never    Drug use: Never    Sexual activity: Yes     Partners: Male     Birth control/protection: None   Social History Narrative    Tobacco: Intermittent use;  once per month has 7 cigarettes; Used ages 18 yo-32 yo; max use 0.5 ppd    EtOH: Twice per week, 2 drinks at one time    Drug: None    Employment: Teacher ()     Education: Bachelor's     Lives with  and 8 yo step-son      Social Drivers of Health     Financial Resource Strain: Low Risk  (7/7/2025)    Overall Financial Resource Strain (CARDIA)     Difficulty of Paying Living Expenses: Not hard at all   Food Insecurity: No Food Insecurity (7/7/2025)    Hunger Vital Sign     Worried About Running Out of Food in the Last Year: Never true     Ran Out of Food in the Last Year: Never true   Transportation Needs: Unmet Transportation Needs (7/7/2025)    PRAPARE - Transportation     Lack of Transportation (Medical): Yes     Lack of Transportation (Non-Medical): No   Physical Activity: Insufficiently Active (7/7/2025)    Exercise Vital Sign     Days of Exercise per Week: 1 day     Minutes of Exercise per Session: 20 min   Stress: No Stress Concern Present (7/7/2025)    [employer] [place] [employer] - Occupational Stress Questionnaire     Feeling of Stress : Not at all   Housing Stability: Low Risk  (7/7/2025)    Housing Stability Vital Sign     Unable to Pay for Housing in the Last Year: No     Number of Times Moved in the Last Year: 0     Homeless in the Last Year: No     Family History   Problem Relation Name Age of Onset    Heart failure Paternal Grandmother Layla Mcdowellohn     Heart disease Paternal Grandmother Layla Mcdowellohn     Heart disease Maternal Grandmother Lise Cazares     Vision loss Maternal Grandmother Lise Cazares     Colon cancer Maternal Grandfather      Leukemia Maternal Grandfather      Hypertension Father Gunner Melohn     Asthma Father Gunner Melohn     Allergies Father Gunner Melohn     Diabetes Father Gunner Melohn     Rheum arthritis Mother Sarita Melohn     Asthma Mother Sarita Melohn     Heart failure Mother Sarita Melohn     Hypertension Mother Sarita Melohn     Allergies Mother Sarita  Melohn     Eczema Mother Sarita Rutledge     Arthritis Mother Sarita Rutledge     Depression Mother Sarita Rutledge     Kidney disease Mother Sarita Rutledge     Miscarriages / Stillbirths Mother Sarita Rutledge     Mental illness Mother Sarita Rutledge         Depression & Anxiety    Breast cancer Neg Hx      Ovarian cancer Neg Hx      Allergic rhinitis Neg Hx      Angioedema Neg Hx      Atopy Neg Hx      Immunodeficiency Neg Hx      Rhinitis Neg Hx      Urticaria Neg Hx         Review of patient's allergies indicates:   Allergen Reactions    Silica gel Dermatitis    Erythromycin Rash       Medication List with Changes/Refills   Current Medications    ALBUTEROL (PROAIR HFA) 90 MCG/ACTUATION INHALER    Inhale 2 puffs into the lungs every 6 (six) hours as needed for Wheezing. Rescue    ATOMOXETINE (STRATTERA) 18 MG CAPSULE    Take 18 mg by mouth.    ATOMOXETINE (STRATTERA) 60 MG CAPSULE    Take 60 mg by mouth once daily.    BUSPIRONE (BUSPAR) 7.5 MG TABLET    Take 7.5 mg by mouth every evening.    CALCIUM CARBONATE/VITAMIN D3 (VITAMIN D-3 ORAL)    Take 125 mcg by mouth Daily.    CRANBERRY ORAL    Take 15,000 mg by mouth Daily.    CYANOCOBALAMIN, VITAMIN B-12, (VITAMIN B-12 ORAL)    Take 3,000 mcg by mouth Daily.    DOCOSAHEXAENOIC ACID (DHA PRENATAL ORAL)    Take 2 tablets by mouth Daily.    FERROUS SULFATE (IRON ORAL)    Take 1 tablet by mouth Daily.    FLOVENT HFA 44 MCG/ACTUATION INHALER    INL 2 PFS PO BID    GABAPENTIN (NEURONTIN) 100 MG CAPSULE    TAKE 1 CAPSULE(100 MG) BY MOUTH EVERY EVENING    LABETALOL (NORMODYNE) 200 MG TABLET    TAKE 1 TABLET(200 MG) BY MOUTH TWICE DAILY    LEVALBUTEROL (XOPENEX) 1.25 MG/3 ML NEBULIZER SOLUTION    Take 3 mLs by nebulization 3 (three) times daily.    MIRTAZAPINE (REMERON) 7.5 MG TAB    Take 7.5 mg by mouth every evening.    MULTIVIT WITH MIN-FOLIC ACID 200 MCG CHEW    Take 2 tablets by mouth Daily.    MUPIROCIN (BACTROBAN) 2 % OINTMENT    Apply topically 3 (three) times daily. To  "folliculitis bumps    MV-MN/C/GLUTAMIN/LYSIN/[id number] (AIRBORNE, ASCORBATE SODIUM, ORAL)    Take 750 mg by mouth Daily.    NIFEDIPINE (PROCARDIA-XL) 60 MG (OSM) 24 HR TABLET    TAKE 1 TABLET BY MOUTH TWICE DAILY    OMEGA-3S/DHA/EPA/FISH OIL (OMEGA 3 ORAL)    Take 1 capsule by mouth Daily.    SERTRALINE (ZOLOFT) 100 MG TABLET    Take 100 mg by mouth once daily.    UBIDECARENONE (COQ-10 ORAL)    Take 200 mg by mouth Daily.    ZEPBOUND 12.5 MG/0.5 ML PNIJ    SMARTSI.5 Milligram(s) SUB-Q Once a Week       Review of Systems   Constitutional: Negative for diaphoresis and fever.   HENT:  Negative for congestion and hearing loss.    Eyes:  Negative for blurred vision and pain.   Cardiovascular:  Negative for chest pain, claudication, dyspnea on exertion, leg swelling, near-syncope, palpitations and syncope.   Respiratory:  Negative for shortness of breath and sleep disturbances due to breathing.    Hematologic/Lymphatic: Negative for bleeding problem. Does not bruise/bleed easily.   Skin:  Negative for color change and poor wound healing.   Gastrointestinal:  Negative for abdominal pain and nausea.   Genitourinary:  Negative for bladder incontinence and flank pain.   Neurological:  Negative for focal weakness and light-headedness.        Objective:   /82 (BP Location: Left arm, Patient Position: Sitting)   Pulse 76   Ht 5' 7" (1.702 m)   Wt 109.3 kg (240 lb 15.4 oz)   SpO2 98%   BMI 37.74 kg/m²    Physical Exam  Constitutional:       Appearance: She is well-developed. She is obese. She is not diaphoretic.   HENT:      Head: Normocephalic and atraumatic.   Eyes:      General: No scleral icterus.     Pupils: Pupils are equal, round, and reactive to light.   Neck:      Vascular: No JVD.   Cardiovascular:      Rate and Rhythm: Normal rate and regular rhythm.      Pulses: Intact distal pulses.           Radial pulses are 2+ on the right side and 2+ on the left side.        Dorsalis pedis pulses are 2+ on the right " side and 2+ on the left side.        Posterior tibial pulses are 2+ on the right side and 2+ on the left side.      Heart sounds: S1 normal and S2 normal. No murmur heard.     No friction rub. No gallop.   Pulmonary:      Effort: Pulmonary effort is normal. No respiratory distress.      Breath sounds: Normal breath sounds. No wheezing or rales.   Chest:      Chest wall: No tenderness.   Abdominal:      General: Bowel sounds are normal. There is no distension.      Palpations: Abdomen is soft. There is no mass.      Tenderness: There is no abdominal tenderness. There is no rebound.   Musculoskeletal:         General: No tenderness. Normal range of motion.      Cervical back: Normal range of motion and neck supple.      Right lower leg: Edema present.      Left lower leg: Edema present.   Skin:     General: Skin is warm and dry.      Coloration: Skin is not pale.   Neurological:      Mental Status: She is alert and oriented to person, place, and time.      Coordination: Coordination normal.      Deep Tendon Reflexes: Reflexes normal.   Psychiatric:         Behavior: Behavior normal.         Judgment: Judgment normal.     EKG reviewed 7/2/25- NSR w septal infarct, age undetermined       Assessment:       1. Nonspecific abnormal electrocardiogram (ECG) (EKG)    2. Benign hypertension    3. Preoperative cardiovascular examination    4. Mixed hyperlipidemia    5. Class 3 severe obesity due to excess calories with serious comorbidity and body mass index (BMI) of 40.0 to 44.9 in adult    6. Mild intermittent asthma without complication    7. Depression, major, recurrent, mild    8. Obstructive sleep apnea syndrome           Plan:         Mixed hyperlipidemia  -.4 3/24/25  -currently changed lifestyle modifications       Benign hypertension  -Goal BP < 130/80  -controlled   -continue medical therapy- labetalol 200 mg (BID), nifedipine 60 mg (BID)   -discussed lifestyle modifications   -enrolled in Digital medicine  program     Nonspecific abnormal electrocardiogram (ECG) (EKG)  EKG reviewed 7/2/25- NSR w septal infarct, age undetermined   -repeat EKG in office -NSR , ST abnormality, cannot rule out Anterior, age undetermined  -EKG 7/8/2025- NSR, anterior infarct, age undetermined   -NM stress test to evaluate for ischemia, patient unable to walk due to severe foot pain     Preoperative cardiovascular examination  Cardiac Risk Estimation: per the Revised Cardiac Risk Index (Circ. 100:1043, 1999), the patient's risk factors for cardiac complications include HTN, putting her in: RCI RISK CLASS II (1 risk factor, risk of major cardiac compl. appr. 1.3%)  -Patient can perform > 4 METS of activity without limitation   -EKG reviewed 7/2/25- NSR w septal infarct, age undetermined  -EKG 7/8/2025- NSR, anterior infarct, age undetermined   -defer procedure until Ischemic evaluation completed      Class 3 severe obesity with body mass index (BMI) of 40.0 to 44.9 in adult  Body mass index is 37.74 kg/m². Morbid obesity complicates all aspects of disease management from diagnostic modalities to treatment. Weight loss encouraged and health benefits explained to patient.   -currently on Zepbound and doing well       Mild intermittent asthma without complication  -followed by PCP  -continue medical therapy     Depression, major, recurrent, mild  -Followed by PCP  -controlled on medical therapy- zoloft and Buspar     Obstructive sleep apnea syndrome  -CPAP, nightly compliant       Total duration of face to face visit time 30 minutes.  Total time spent counseling greater than fifty percent of total visit time.  Counseling included discussion regarding imaging findings, diagnosis, possibilities, treatment options, risks and benefits.  The patient had many questions regarding the options and long-term effects      Antonino Meredith, DNP  Cardiology

## 2025-07-08 NOTE — ASSESSMENT & PLAN NOTE
EKG reviewed 7/2/25- NSR w septal infarct, age undetermined   -repeat EKG in office -NSR , ST abnormality, cannot rule out Anterior, age undetermined  -EKG 7/8/2025- NSR, anterior infarct, age undetermined   -NM stress test to evaluate for ischemia, patient unable to walk due to severe foot pain

## 2025-07-08 NOTE — ASSESSMENT & PLAN NOTE
Cardiac Risk Estimation: per the Revised Cardiac Risk Index (Circ. 100:1043, 1999), the patient's risk factors for cardiac complications include HTN, putting her in: RCI RISK CLASS II (1 risk factor, risk of major cardiac compl. appr. 1.3%)  -Patient can perform > 4 METS of activity without limitation   -EKG reviewed 7/2/25- NSR w septal infarct, age undetermined  -EKG 7/8/2025- NSR, anterior infarct, age undetermined   -defer procedure until Ischemic evaluation completed

## 2025-07-08 NOTE — ASSESSMENT & PLAN NOTE
-Goal BP < 130/80  -controlled   -continue medical therapy- labetalol 200 mg (BID), nifedipine 60 mg (BID)   -discussed lifestyle modifications   -enrolled in Digital medicine program

## 2025-07-09 ENCOUNTER — OFFICE VISIT (OUTPATIENT)
Dept: PODIATRY | Facility: CLINIC | Age: 40
End: 2025-07-09
Payer: COMMERCIAL

## 2025-07-09 VITALS — RESPIRATION RATE: 18 BRPM

## 2025-07-09 DIAGNOSIS — M21.869 ACQUIRED POSTERIOR EQUINUS, UNSPECIFIED LATERALITY: ICD-10-CM

## 2025-07-09 DIAGNOSIS — R26.9 IMPAIRED GAIT: ICD-10-CM

## 2025-07-09 DIAGNOSIS — M72.2 PLANTAR FASCIITIS OF RIGHT FOOT: Primary | ICD-10-CM

## 2025-07-09 PROCEDURE — 99999 PR PBB SHADOW E&M-EST. PATIENT-LVL IV: CPT | Mod: PBBFAC,,, | Performed by: PODIATRIST

## 2025-07-09 PROCEDURE — 99213 OFFICE O/P EST LOW 20 MIN: CPT | Mod: S$GLB,,, | Performed by: PODIATRIST

## 2025-07-09 PROCEDURE — 3044F HG A1C LEVEL LT 7.0%: CPT | Mod: CPTII,S$GLB,, | Performed by: PODIATRIST

## 2025-07-17 RX ORDER — NIFEDIPINE 60 MG/1
60 TABLET, EXTENDED RELEASE ORAL 2 TIMES DAILY
Qty: 180 TABLET | Refills: 3 | Status: SHIPPED | OUTPATIENT
Start: 2025-07-17

## 2025-07-17 NOTE — TELEPHONE ENCOUNTER
Refill Decision Note   Michelle Messer  is requesting a refill authorization.    Brief Assessment and Rationale for Refill:   Approve       Medication Therapy Plan:         Comments:     Note composed:11:50 AM 07/17/2025

## 2025-07-22 ENCOUNTER — TELEPHONE (OUTPATIENT)
Dept: PODIATRY | Facility: CLINIC | Age: 40
End: 2025-07-22
Payer: COMMERCIAL

## 2025-07-23 ENCOUNTER — PATIENT MESSAGE (OUTPATIENT)
Dept: CARDIOLOGY | Facility: CLINIC | Age: 40
End: 2025-07-23
Payer: COMMERCIAL

## 2025-07-24 ENCOUNTER — TELEPHONE (OUTPATIENT)
Dept: CARDIOLOGY | Facility: CLINIC | Age: 40
End: 2025-07-24
Payer: COMMERCIAL

## 2025-07-24 DIAGNOSIS — R94.31 NONSPECIFIC ABNORMAL ELECTROCARDIOGRAM (ECG) (EKG): ICD-10-CM

## 2025-07-24 DIAGNOSIS — R94.39 ABNORMAL NUCLEAR STRESS TEST: Primary | ICD-10-CM

## 2025-07-24 NOTE — TELEPHONE ENCOUNTER
Call placed to MsJose Messer. Informed patient of abnormal NM stress test results which could indicate possible blockage in the heart arteries. Inform patient test revealed two areas of abnormality. Discussed plan and referral has been placed for Interventional Cardiology for further evaluation and treatment which may include a cardiac angiogram. Patient verbalized understanding and agrees with plan of care.      Antonino Meredith, [facility]  [facility]

## 2025-07-24 NOTE — ASSESSMENT & PLAN NOTE
NM stress test- cardiology interpreted 7/22/24  Interpretation Summary  Show Result Comparison     Abnormal myocardial perfusion scan.    There are two significant perfusion abnormalities.    Perfusion Abnormality #1 - There is a moderate intensity, medium sized, equivocal perfusion abnormality that is mostly fixed with some reversible areas. This finding is equivocal due to soft tissue shadow.    Perfusion Abnormality #2 - There is a medium sized, equivocal perfusion abnormality that is fixed in the inferior wall(s). This finding is equivocal due to diaphragm shadow.    There are no other significant perfusion abnormalities.    The gated perfusion images showed an ejection fraction of 71% at rest. The gated perfusion images showed an ejection fraction of 73% post stress.    The ECG portion of the study is negative for ischemia.    The patient reported no chest pain during the stress test.    There were no arrhythmias during stress.    The patient reported no symptoms during the stress test.    The ECG portion of the study is negative for ischemia.    Stress ECG: There is no ST segment deviation identified during the protocol.    Baseline ECG: The Baseline ECG reveals sinus rhythm. The ST segments are normal.    The overall image quality is poor. Abnormal myocardial perfusion scan. There are two significant perfusion abnormalities. First, there is a medium sized, moderate intensity, equivocal perfusion abnormality that is mostly fixed with some reversible areas. This finding is equivocal due to soft tissue shadow. Second, there is a medium sized, equivocal perfusion abnormality that is fixed in the inferior wall(s). This finding is equivocal due to diaphragm shadow.    -Refer to Interventional Cardiology for further evaluation

## 2025-07-25 ENCOUNTER — TELEPHONE (OUTPATIENT)
Dept: CARDIOLOGY | Facility: CLINIC | Age: 40
End: 2025-07-25
Payer: COMMERCIAL

## 2025-07-25 NOTE — TELEPHONE ENCOUNTER
Contacted Mr Messer and advised her I SCHEDULED her to see an interventional cardiologist Dr Marie    Thank you,    Melly Silver  Medical Assistant   [facility]   [phone]-Phone  [phone]-Fax

## 2025-08-01 ENCOUNTER — PATIENT MESSAGE (OUTPATIENT)
Dept: PODIATRY | Facility: CLINIC | Age: 40
End: 2025-08-01
Payer: COMMERCIAL

## 2025-08-01 ENCOUNTER — PATIENT MESSAGE (OUTPATIENT)
Dept: CARDIOLOGY | Facility: CLINIC | Age: 40
End: 2025-08-01
Payer: COMMERCIAL

## 2025-08-01 ENCOUNTER — TELEPHONE (OUTPATIENT)
Dept: PODIATRY | Facility: CLINIC | Age: 40
End: 2025-08-01
Payer: COMMERCIAL

## 2025-08-01 ENCOUNTER — PATIENT MESSAGE (OUTPATIENT)
Dept: CARDIOLOGY | Facility: CLINIC | Age: 40
End: 2025-08-01

## 2025-08-01 ENCOUNTER — OFFICE VISIT (OUTPATIENT)
Dept: CARDIOLOGY | Facility: CLINIC | Age: 40
End: 2025-08-01
Payer: COMMERCIAL

## 2025-08-01 VITALS
SYSTOLIC BLOOD PRESSURE: 129 MMHG | DIASTOLIC BLOOD PRESSURE: 84 MMHG | HEIGHT: 67 IN | BODY MASS INDEX: 38.37 KG/M2 | OXYGEN SATURATION: 98 % | HEART RATE: 88 BPM | WEIGHT: 244.5 LBS

## 2025-08-01 DIAGNOSIS — E78.2 MIXED HYPERLIPIDEMIA: ICD-10-CM

## 2025-08-01 DIAGNOSIS — Z13.6 ENCOUNTER FOR SCREENING FOR CARDIOVASCULAR DISORDERS: Primary | ICD-10-CM

## 2025-08-01 DIAGNOSIS — R94.31 NONSPECIFIC ABNORMAL ELECTROCARDIOGRAM (ECG) (EKG): ICD-10-CM

## 2025-08-01 DIAGNOSIS — R94.39 ABNORMAL NUCLEAR STRESS TEST: ICD-10-CM

## 2025-08-01 LAB
OHS QRS DURATION: 78 MS
OHS QTC CALCULATION: 456 MS

## 2025-08-01 PROCEDURE — 93000 ELECTROCARDIOGRAM COMPLETE: CPT | Mod: S$GLB,,, | Performed by: INTERNAL MEDICINE

## 2025-08-01 PROCEDURE — 99999 PR PBB SHADOW E&M-EST. PATIENT-LVL V: CPT | Mod: PBBFAC,,, | Performed by: INTERNAL MEDICINE

## 2025-08-01 RX ORDER — ROSUVASTATIN CALCIUM 10 MG/1
10 TABLET, COATED ORAL DAILY
Qty: 90 TABLET | Refills: 3 | Status: SHIPPED | OUTPATIENT
Start: 2025-08-01 | End: 2026-08-01

## 2025-08-01 NOTE — TELEPHONE ENCOUNTER
Spoke with patient about getting cleared for surgery from her cardiology. I asked the patient that she will need to bring in that paper work from her cardiologist.

## 2025-08-04 ENCOUNTER — TELEPHONE (OUTPATIENT)
Dept: CARDIOLOGY | Facility: CLINIC | Age: 40
End: 2025-08-04
Payer: COMMERCIAL

## 2025-08-04 ENCOUNTER — DOCUMENTATION ONLY (OUTPATIENT)
Dept: CARDIOLOGY | Facility: CLINIC | Age: 40
End: 2025-08-04
Payer: COMMERCIAL

## 2025-08-04 DIAGNOSIS — Z01.810 PREOPERATIVE CARDIOVASCULAR EXAMINATION: Primary | ICD-10-CM

## 2025-08-04 NOTE — TELEPHONE ENCOUNTER
Reached out to advise patient of her preop appt note are ready.    Thank you,    Melly Silver  Medical Assistant   [facility]   [phone]-Phone  [phone]-Fax      ----- Message from Antonino Meredith NP sent at 8/4/2025  4:34 PM CDT -----  Regarding: Pre-op  Please provide Ms. Messer with a copy of pre-op note dated today.    Thank you,  Antonino Meredith DNP

## 2025-08-04 NOTE — ASSESSMENT & PLAN NOTE
Cardiac Risk Estimation: per the Revised Cardiac Risk Index (Circ. 100:1043, 1999), the patient's risk factors for cardiac complications include HTN, putting her in: RCI RISK CLASS II (1 risk factor, risk of major cardiac compl. appr. 1.3%)  -Patient can perform > 4 METS of activity without limitation   -EKG reviewed 7/2/25- NSR w septal infarct, age undetermined  -EKG 7/8/2025- NSR, anterior infarct, age undetermined   -NM stress test 7/22/2025  Interpretation Summary  Show Result Comparison     Abnormal myocardial perfusion scan.    There are two significant perfusion abnormalities.    Perfusion Abnormality #1 - There is a moderate intensity, medium sized, equivocal perfusion abnormality that is mostly fixed with some reversible areas. This finding is equivocal due to soft tissue shadow.    Perfusion Abnormality #2 - There is a medium sized, equivocal perfusion abnormality that is fixed in the inferior wall(s). This finding is equivocal due to diaphragm shadow.  -Patient was referred  to Interventional Cardiology and was seen on 8/1/2025 by Dr. Marie- ally to proceed with surgical procedure  -EKG 8/1/25- Normal Sinus rhythm   - no additional testing required prior to surgical procedure-ally to proceed per Dr. Marie

## 2025-08-04 NOTE — PROGRESS NOTES
Patient was physically seen in clinic on 7/8/2025 please see note for detailed H/P.      Cardiac Risk Estimation: per the Revised Cardiac Risk Index (Circ. 100:1043, 1999), the patient's risk factors for cardiac complications include HTN, putting her in: RCI RISK CLASS II (1 risk factor, risk of major cardiac compl. appr. 1.3%)  -Patient can perform > 4 METS of activity without limitation   -EKG reviewed 7/2/25- NSR w septal infarct, age undetermined  -EKG 7/8/2025- NSR, anterior infarct, age undetermined   -NM stress test 7/22/2025  Interpretation Summary  Show Result Comparison     Abnormal myocardial perfusion scan.    There are two significant perfusion abnormalities.    Perfusion Abnormality #1 - There is a moderate intensity, medium sized, equivocal perfusion abnormality that is mostly fixed with some reversible areas. This finding is equivocal due to soft tissue shadow.    Perfusion Abnormality #2 - There is a medium sized, equivocal perfusion abnormality that is fixed in the inferior wall(s). This finding is equivocal due to diaphragm shadow.  -Patient was referred  to Interventional Cardiology and was seen on 8/1/2025 by Dr. Mari canales to proceed with surgical procedure  -EKG 8/1/25- Normal Sinus rhythm   - no additional testing required prior to surgical procedure-ally to proceed per Dr. Frank Meredith, [facility]  [facility]

## 2025-08-04 NOTE — PROGRESS NOTES
Subjective:   @Patient ID:  Michelle Messer is a 39 y.o. female who presents for follow-up of No chief complaint on file.      HPI:       History of Present Illness    CHIEF COMPLAINT:  Patient presents today for cardiology evaluation prior to foot surgery.    SURGICAL HISTORY:  She has upcoming foot surgery involving fasciotomy and tendon lengthening under general anesthesia. She is awaiting medical clearance for surgical scheduling and appears to understand the basic components of the planned surgical intervention.    CARDIOVASCULAR:  She has controlled HTN currently managed with labetalol and nifedipine twice daily. She denies chest heaviness, pressure, or significant cardiac symptoms. She reports ability to walk around with minimal issues.    MEDICAL HISTORY:  She has a history of HLD, currently not on any cholesterol medication. She is diagnosed with sleep apnea. She denies history of diabetes or prediabetes.    FAMILY HISTORY:  She reports maternal history of heart failure and family history of HTN, suggesting potential genetic predisposition to cardiovascular conditions.    WEIGHT MANAGEMENT:  She has successfully lost 60 lbs over the past 1-1.5 years using Zepbound for weight loss.    CURRENT MEDICATIONS:  She is currently taking labetalol twice daily, nifedipine twice daily, and Zepbound for weight loss.          No results found for this or any previous visit.      Results for orders placed during the hospital encounter of 07/22/25    Nuclear Stress - Cardiology Interpreted    Interpretation Summary    Abnormal myocardial perfusion scan.    There are two significant perfusion abnormalities.    Perfusion Abnormality #1 - There is a moderate intensity, medium sized, equivocal perfusion abnormality that is mostly fixed with some reversible areas. This finding is equivocal due to soft tissue shadow.    Perfusion Abnormality #2 - There is a medium sized, equivocal perfusion abnormality that is fixed in the  inferior wall(s). This finding is equivocal due to diaphragm shadow.    There are no other significant perfusion abnormalities.    The gated perfusion images showed an ejection fraction of 71% at rest. The gated perfusion images showed an ejection fraction of 73% post stress.    The ECG portion of the study is negative for ischemia.    The patient reported no chest pain during the stress test.    There were no arrhythmias during stress.    The patient reported no symptoms during the stress test.    The ECG portion of the study is negative for ischemia.    Stress ECG: There is no ST segment deviation identified during the protocol.    Baseline ECG: The Baseline ECG reveals sinus rhythm. The ST segments are normal.    The overall image quality is poor. Abnormal myocardial perfusion scan. There are two significant perfusion abnormalities. First, there is a medium sized, moderate intensity, equivocal perfusion abnormality that is mostly fixed with some reversible areas. This finding is equivocal due to soft tissue shadow. Second, there is a medium sized, equivocal perfusion abnormality that is fixed in the inferior wall(s). This finding is equivocal due to diaphragm shadow.      No results found for this or any previous visit.      Please document below the medical necessity for continuous telemetry monitoring or discontinue the current order if appropriate.    Current rhythm from flowsheet:               Patient Active Problem List    Diagnosis Date Noted    Abnormal nuclear stress test 07/24/2025    Nonspecific abnormal electrocardiogram (ECG) (EKG) 07/08/2025    Preoperative cardiovascular examination 07/08/2025    Preop exam for internal medicine 07/01/2025     Pt with good functional status   Cleared for procedure with low risk cardiac complications pending EKG tomorrow       Tinea corporis 03/24/2025     Fluconazole 150 mg q week x 4       Obstructive sleep apnea syndrome 01/05/2025    Pain of right heel  12/05/2024    At risk for obstructive sleep apnea 07/01/2024    Insomnia 01/11/2024     Trazodone 100 mg as needed       Lack of concentration 01/11/2024     Refer to psychology for assessment of add /adhd      Mixed hyperlipidemia 01/11/2024     Lifestyle mod with diet and exericse repeat next visit       Carpal tunnel syndrome of left wrist 12/11/2023     Refer to orhto for eval and treat   Cts splint recomened      Decreased range of motion of left ankle 07/19/2023    Impaired gait 07/19/2023    Onychomycosis 12/12/2022     Will try penlac topical.      Impaired glucose tolerance 12/12/2022     Labs reviewed and look stable       Plantar fasciitis of right foot 07/07/2022     For corrective procedure with podiatry 7/11       Asthma exacerbation, mild 01/25/2022     Amoxil 500 mg bid disp 14   Medrol dose pack  Cont inhalers       Annual physical exam 12/28/2021     pe documented all labs ordere d.        Depression, major, recurrent, mild 12/28/2021     Doing well on effexor 75 mg cont same      Generalized anxiety disorder 01/23/2020    Benign hypertension 01/23/2020     bp well controlled on current regimen       Mild intermittent asthma without complication 01/23/2020     Uses inhaler maybe once per month cont current       Class 3 severe obesity with body mass index (BMI) of 40.0 to 44.9 in adult 01/23/2020     Will be starting zepbound soon      Allergic conjunctivitis of both eyes 01/23/2020    Dermatitis 01/23/2020    Degeneration of thoracic intervertebral disc 11/25/2019                    LAST HbA1c  Lab Results   Component Value Date    HGBA1C 4.7 07/02/2025       Lipid panel  Lab Results   Component Value Date    CHOL 242 (H) 03/24/2025    CHOL 250 (H) 12/08/2023    CHOL 214 (H) 03/24/2020     Lab Results   Component Value Date    HDL 46 03/24/2025    HDL 51 12/08/2023    HDL 50 03/24/2020     Lab Results   Component Value Date    LDLCALC 169.4 (H) 03/24/2025    LDLCALC 173.4 (H) 12/08/2023    LDLCALC  134.8 03/24/2020     Lab Results   Component Value Date    TRIG 133 03/24/2025    TRIG 128 12/08/2023    TRIG 146 03/24/2020     Lab Results   Component Value Date    CHOLHDL 19.0 (L) 03/24/2025    CHOLHDL 20.4 12/08/2023    CHOLHDL 23.4 03/24/2020            Review of Systems   Constitutional: Negative for chills and fever.   HENT:  Negative for hearing loss and nosebleeds.    Eyes:  Negative for blurred vision.   Cardiovascular:         As in HPI    Respiratory:  Negative for cough, hemoptysis and shortness of breath.    Endocrine: Negative for cold intolerance and polyuria.   Hematologic/Lymphatic: Negative for bleeding problem.   Skin:  Negative for itching.   Musculoskeletal:  Negative for falls.   Gastrointestinal:  Negative for abdominal pain and hematochezia.   Genitourinary:  Negative for hematuria.   Neurological:  Negative for dizziness and loss of balance.   Psychiatric/Behavioral:  Negative for altered mental status and depression.        Objective:   Physical Exam  Constitutional:       Appearance: She is well-developed.   HENT:      Head: Normocephalic and atraumatic.   Eyes:      Conjunctiva/sclera: Conjunctivae normal.   Neck:      Vascular: No carotid bruit or JVD.   Cardiovascular:      Rate and Rhythm: Normal rate and regular rhythm.      Pulses:           Carotid pulses are 2+ on the right side and 2+ on the left side.       Radial pulses are 2+ on the right side and 2+ on the left side.      Heart sounds: Normal heart sounds. No murmur heard.     No friction rub. No gallop.   Pulmonary:      Effort: Pulmonary effort is normal. No respiratory distress.      Breath sounds: Normal breath sounds. No stridor. No wheezing.   Abdominal:      General: Abdomen is flat.      Palpations: Abdomen is soft.   Musculoskeletal:      Cervical back: Neck supple.      Right lower leg: No edema.      Left lower leg: No edema.   Skin:     General: Skin is warm and dry.   Neurological:      Mental Status: She is  alert and oriented to person, place, and time.   Psychiatric:         Behavior: Behavior normal.         Assessment and Plan:     Assessment & Plan    R94.39 Abnormal nuclear stress test  R94.31 Nonspecific abnormal ECG (EKG)  Z13.6 Encounter for screening for cardiovascular disorders    ABNORMAL NUCLEAR STRESS TEST:  - Stress test likely false positive due to potential interference from fat tissue or diaphragm, rather than blocked vessel.  - Decided against recommending angiogram at this time.  - Will evaluate need for aspirin therapy and aggressiveness of cholesterol management based on future calcium score and echocardiogram results.  - No immediate cardiac concerns preclude scheduled foot surgery.  - Explained that fixing potential stenosis without symptoms could lead to unnecessary complications and delay surgery due to required anticoagulants.  - Ordered calcium score test and echocardiogram to be completed within next 6 months.    NONSPECIFIC ABNORMAL ECG (EKG):  - Discussed that AI interpretation of EKG can misread fat tissue interference as silent heart attack.    ENCOUNTER FOR SCREENING FOR CARDIOVASCULAR DISORDERS:  - Started cholesterol medication at 10 mg dose, to begin after foot surgery.  - Stop and contact Dr. Resendez or Antonino if side effects occur.  - Continue weight loss efforts.  - Follow up with Antonino in the future.  - Contact office with any questions or if need to see Dr. Resendez sooner.    PLAN SUMMARY:  - Start cholesterol medication at 10 mg dose after foot surgery  - Order calcium score test and echocardiogram within next 6 months  - Continue weight loss efforts  - Evaluate need for aspirin therapy based on future test results  - Proceed with scheduled foot surgery  - Contact office if questions arise or need to see Dr. Resendez sooner  - Stop medication and contact Dr. Resendez or Antonino if side effects occur  - Follow up with Antonino in the future              Pertinent cardiac images and EKG reviewed  independently.    Continue with current medical plan and lifestyle changes.  Return sooner for concerns or questions. If symptoms persist go to the ED  I have reviewed all pertinent data including patient's medical history in detail and updated the computerized patient record.     Orders Placed This Encounter   Procedures    CT Cardiac Scoring     Standing Status:   Future     Expected Date:   8/1/2025     Expiration Date:   8/1/2026     May the Radiologist modify the order per protocol to meet the clinical needs of the patient?:   Yes    IN OFFICE EKG 12-LEAD (to [place])    Echo     Standing Status:   Future     Expiration Date:   8/1/2026     Release to patient:   Immediate       Follow up as scheduled.     She expressed verbal understanding and agreed with the plan    Patient's Medications   New Prescriptions    ROSUVASTATIN (CRESTOR) 10 MG TABLET    Take 1 tablet (10 mg total) by mouth once daily.   Previous Medications    ALBUTEROL (PROAIR HFA) 90 MCG/ACTUATION INHALER    Inhale 2 puffs into the lungs every 6 (six) hours as needed for Wheezing. Rescue    ATOMOXETINE (STRATTERA) 18 MG CAPSULE    Take 18 mg by mouth.    ATOMOXETINE (STRATTERA) 60 MG CAPSULE    Take 60 mg by mouth once daily.    BUSPIRONE (BUSPAR) 7.5 MG TABLET    Take 7.5 mg by mouth every evening.    CALCIUM CARBONATE/VITAMIN D3 (VITAMIN D-3 ORAL)    Take 125 mcg by mouth Daily.    CRANBERRY ORAL    Take 15,000 mg by mouth Daily.    CYANOCOBALAMIN, VITAMIN B-12, (VITAMIN B-12 ORAL)    Take 3,000 mcg by mouth Daily.    DOCOSAHEXAENOIC ACID (DHA PRENATAL ORAL)    Take 2 tablets by mouth Daily.    FERROUS SULFATE (IRON ORAL)    Take 1 tablet by mouth Daily.    FLOVENT HFA 44 MCG/ACTUATION INHALER    INL 2 PFS PO BID    GABAPENTIN (NEURONTIN) 100 MG CAPSULE    TAKE 1 CAPSULE(100 MG) BY MOUTH EVERY EVENING    LABETALOL (NORMODYNE) 200 MG TABLET    TAKE 1 TABLET(200 MG) BY MOUTH TWICE DAILY    LEVALBUTEROL (XOPENEX) 1.25 MG/3 ML NEBULIZER SOLUTION     Take 3 mLs by nebulization 3 (three) times daily.    MIRTAZAPINE (REMERON) 7.5 MG TAB    Take 7.5 mg by mouth every evening.    MULTIVIT WITH MIN-FOLIC ACID 200 MCG CHEW    Take 2 tablets by mouth Daily.    MUPIROCIN (BACTROBAN) 2 % OINTMENT    Apply topically 3 (three) times daily. To folliculitis bumps    MV-MN/C/GLUTAMIN/LYSIN/YZDS646 ([place], ASCORBATE SODIUM, ORAL)    Take 750 mg by mouth Daily.    NIFEDIPINE (PROCARDIA-XL) 60 MG (OSM) 24 HR TABLET    TAKE 1 TABLET BY MOUTH TWICE DAILY    OMEGA-3S/DHA/EPA/FISH OIL (OMEGA 3 ORAL)    Take 1 capsule by mouth Daily.    SERTRALINE (ZOLOFT) 100 MG TABLET    Take 100 mg by mouth once daily.    UBIDECARENONE (COQ-10 ORAL)    Take 200 mg by mouth Daily.    ZEPBOUND 12.5 MG/0.5 ML PNIJ    SMARTSI.5 Milligram(s) SUB-Q Once a Week   Modified Medications    No medications on file   Discontinued Medications    No medications on file        Gerald Marie M.D      This note was generated with the assistance of ambient listening technology. Verbal consent was obtained by the patient and accompanying visitor(s) for the recording of patient appointment to facilitate this note. I attest to having reviewed and edited the generated note for accuracy, though some syntax or spelling errors may persist. Please contact the author of this note for any clarification.

## 2025-08-10 ENCOUNTER — OFFICE VISIT (OUTPATIENT)
Dept: URGENT CARE | Facility: CLINIC | Age: 40
End: 2025-08-10
Payer: COMMERCIAL

## 2025-08-10 VITALS
WEIGHT: 244 LBS | SYSTOLIC BLOOD PRESSURE: 121 MMHG | BODY MASS INDEX: 38.3 KG/M2 | HEART RATE: 75 BPM | OXYGEN SATURATION: 98 % | DIASTOLIC BLOOD PRESSURE: 82 MMHG | HEIGHT: 67 IN | RESPIRATION RATE: 19 BRPM | TEMPERATURE: 98 F

## 2025-08-10 DIAGNOSIS — J34.89 NASAL VESTIBULITIS: ICD-10-CM

## 2025-08-10 DIAGNOSIS — U07.1 COVID-19 VIRUS DETECTED: ICD-10-CM

## 2025-08-10 DIAGNOSIS — R50.9 FEVER, UNSPECIFIED FEVER CAUSE: ICD-10-CM

## 2025-08-10 DIAGNOSIS — U07.1 COVID: Primary | ICD-10-CM

## 2025-08-10 DIAGNOSIS — R05.9 COUGH, UNSPECIFIED TYPE: ICD-10-CM

## 2025-08-10 LAB
CTP QC/QA: YES
SARS-COV+SARS-COV-2 AG RESP QL IA.RAPID: POSITIVE

## 2025-08-10 PROCEDURE — 99213 OFFICE O/P EST LOW 20 MIN: CPT | Mod: S$GLB,,, | Performed by: PHYSICIAN ASSISTANT

## 2025-08-10 PROCEDURE — 87811 SARS-COV-2 COVID19 W/OPTIC: CPT | Mod: QW,S$GLB,, | Performed by: PHYSICIAN ASSISTANT

## 2025-08-10 RX ORDER — GUAIFENESIN AND DEXTROMETHORPHAN HYDROBROMIDE 1200; 60 MG/1; MG/1
1 TABLET, EXTENDED RELEASE ORAL EVERY 12 HOURS
Qty: 20 TABLET | Refills: 0 | Status: SHIPPED | OUTPATIENT
Start: 2025-08-10 | End: 2025-08-20

## 2025-08-10 RX ORDER — ALBUTEROL SULFATE 90 UG/1
2 INHALANT RESPIRATORY (INHALATION) EVERY 4 HOURS PRN
Qty: 18 G | Refills: 0 | Status: SHIPPED | OUTPATIENT
Start: 2025-08-10 | End: 2026-08-10

## 2025-08-10 RX ORDER — MUPIROCIN 20 MG/G
OINTMENT TOPICAL 2 TIMES DAILY
Qty: 22 G | Refills: 0 | Status: SHIPPED | OUTPATIENT
Start: 2025-08-10 | End: 2025-08-17

## 2025-08-10 RX ORDER — PROMETHAZINE HYDROCHLORIDE AND DEXTROMETHORPHAN HYDROBROMIDE 6.25; 15 MG/5ML; MG/5ML
5 SYRUP ORAL EVERY 8 HOURS PRN
Qty: 118 ML | Refills: 0 | Status: SHIPPED | OUTPATIENT
Start: 2025-08-10 | End: 2025-08-20

## 2025-08-22 ENCOUNTER — OFFICE VISIT (OUTPATIENT)
Dept: PODIATRY | Facility: CLINIC | Age: 40
End: 2025-08-22
Payer: COMMERCIAL

## 2025-08-22 VITALS — HEIGHT: 67 IN | WEIGHT: 244.06 LBS | BODY MASS INDEX: 38.3 KG/M2

## 2025-08-22 DIAGNOSIS — M21.869 ACQUIRED POSTERIOR EQUINUS, UNSPECIFIED LATERALITY: ICD-10-CM

## 2025-08-22 DIAGNOSIS — M72.2 PLANTAR FASCIITIS OF RIGHT FOOT: Primary | ICD-10-CM

## 2025-08-22 PROCEDURE — 99999 PR PBB SHADOW E&M-EST. PATIENT-LVL IV: CPT | Mod: PBBFAC,,, | Performed by: PODIATRIST
